# Patient Record
Sex: MALE | Race: WHITE | NOT HISPANIC OR LATINO | URBAN - METROPOLITAN AREA
[De-identification: names, ages, dates, MRNs, and addresses within clinical notes are randomized per-mention and may not be internally consistent; named-entity substitution may affect disease eponyms.]

---

## 2024-06-11 ENCOUNTER — APPOINTMENT (EMERGENCY)
Dept: RADIOLOGY | Facility: HOSPITAL | Age: 59
DRG: 091 | End: 2024-06-11
Payer: COMMERCIAL

## 2024-06-11 ENCOUNTER — HOSPITAL ENCOUNTER (OUTPATIENT)
Facility: HOSPITAL | Age: 59
Setting detail: OBSERVATION
Discharge: OTHER INSTITUTION | DRG: 091 | End: 2024-06-12
Attending: EMERGENCY MEDICINE | Admitting: STUDENT IN AN ORGANIZED HEALTH CARE EDUCATION/TRAINING PROGRAM
Payer: COMMERCIAL

## 2024-06-11 DIAGNOSIS — R47.81 SLURRED SPEECH: Primary | ICD-10-CM

## 2024-06-11 DIAGNOSIS — R41.82 AMS (ALTERED MENTAL STATUS): ICD-10-CM

## 2024-06-11 DIAGNOSIS — F10.10 ALCOHOL ABUSE: ICD-10-CM

## 2024-06-11 PROBLEM — I10 PRIMARY HYPERTENSION: Status: ACTIVE | Noted: 2024-06-11

## 2024-06-11 PROBLEM — Z86.73 HISTORY OF STROKE: Status: ACTIVE | Noted: 2024-06-11

## 2024-06-11 PROBLEM — G93.40 ENCEPHALOPATHY: Status: ACTIVE | Noted: 2024-06-11

## 2024-06-11 PROBLEM — Z87.898 HISTORY OF CRACK COCAINE USE: Status: ACTIVE | Noted: 2024-06-11

## 2024-06-11 PROBLEM — E11.9 TYPE 2 DIABETES MELLITUS WITHOUT COMPLICATION, WITHOUT LONG-TERM CURRENT USE OF INSULIN (HCC): Status: ACTIVE | Noted: 2024-06-11

## 2024-06-11 LAB
2HR DELTA HS TROPONIN: 0 NG/L
ALBUMIN SERPL BCP-MCNC: 3.9 G/DL (ref 3.5–5)
ALP SERPL-CCNC: 84 U/L (ref 34–104)
ALT SERPL W P-5'-P-CCNC: 79 U/L (ref 7–52)
AMMONIA PLAS-SCNC: 30 UMOL/L (ref 18–72)
AMPHETAMINES SERPL QL SCN: NEGATIVE
ANION GAP SERPL CALCULATED.3IONS-SCNC: 7 MMOL/L (ref 4–13)
APAP SERPL-MCNC: <2 UG/ML (ref 10–20)
AST SERPL W P-5'-P-CCNC: 37 U/L (ref 13–39)
ATRIAL RATE: 26 BPM
ATRIAL RATE: 81 BPM
BACTERIA UR QL AUTO: ABNORMAL /HPF
BARBITURATES UR QL: NEGATIVE
BASE EX.OXY STD BLDV CALC-SCNC: 76.3 % (ref 60–80)
BASE EXCESS BLDV CALC-SCNC: 3.6 MMOL/L
BASOPHILS # BLD AUTO: 0.02 THOUSANDS/ÂΜL (ref 0–0.1)
BASOPHILS NFR BLD AUTO: 0 % (ref 0–1)
BENZODIAZ UR QL: POSITIVE
BILIRUB DIRECT SERPL-MCNC: 0.08 MG/DL (ref 0–0.2)
BILIRUB SERPL-MCNC: 0.25 MG/DL (ref 0.2–1)
BILIRUB UR QL STRIP: NEGATIVE
BUN SERPL-MCNC: 12 MG/DL (ref 5–25)
CALCIUM SERPL-MCNC: 9.5 MG/DL (ref 8.4–10.2)
CARDIAC TROPONIN I PNL SERPL HS: 16 NG/L
CARDIAC TROPONIN I PNL SERPL HS: 16 NG/L
CHLORIDE SERPL-SCNC: 101 MMOL/L (ref 96–108)
CLARITY UR: CLEAR
CO2 SERPL-SCNC: 33 MMOL/L (ref 21–32)
COCAINE UR QL: NEGATIVE
COLOR UR: ABNORMAL
CREAT SERPL-MCNC: 0.73 MG/DL (ref 0.6–1.3)
EOSINOPHIL # BLD AUTO: 0.12 THOUSAND/ÂΜL (ref 0–0.61)
EOSINOPHIL NFR BLD AUTO: 2 % (ref 0–6)
ERYTHROCYTE [DISTWIDTH] IN BLOOD BY AUTOMATED COUNT: 12.9 % (ref 11.6–15.1)
EST. AVERAGE GLUCOSE BLD GHB EST-MCNC: 160 MG/DL
ETHANOL SERPL-MCNC: <10 MG/DL
FENTANYL UR QL SCN: NEGATIVE
GFR SERPL CREATININE-BSD FRML MDRD: 101 ML/MIN/1.73SQ M
GLUCOSE SERPL-MCNC: 134 MG/DL (ref 65–140)
GLUCOSE SERPL-MCNC: 193 MG/DL (ref 65–140)
GLUCOSE SERPL-MCNC: 211 MG/DL (ref 65–140)
GLUCOSE SERPL-MCNC: 213 MG/DL (ref 65–140)
GLUCOSE UR STRIP-MCNC: NEGATIVE MG/DL
HBA1C MFR BLD: 7.2 %
HCO3 BLDV-SCNC: 31.1 MMOL/L (ref 24–30)
HCT VFR BLD AUTO: 43.6 % (ref 36.5–49.3)
HGB BLD-MCNC: 14.5 G/DL (ref 12–17)
HGB UR QL STRIP.AUTO: NEGATIVE
HYDROCODONE UR QL SCN: NEGATIVE
IMM GRANULOCYTES # BLD AUTO: 0.03 THOUSAND/UL (ref 0–0.2)
IMM GRANULOCYTES NFR BLD AUTO: 1 % (ref 0–2)
KETONES UR STRIP-MCNC: NEGATIVE MG/DL
LEUKOCYTE ESTERASE UR QL STRIP: NEGATIVE
LYMPHOCYTES # BLD AUTO: 1.13 THOUSANDS/ÂΜL (ref 0.6–4.47)
LYMPHOCYTES NFR BLD AUTO: 19 % (ref 14–44)
MCH RBC QN AUTO: 30.6 PG (ref 26.8–34.3)
MCHC RBC AUTO-ENTMCNC: 33.3 G/DL (ref 31.4–37.4)
MCV RBC AUTO: 92 FL (ref 82–98)
METHADONE UR QL: NEGATIVE
MONOCYTES # BLD AUTO: 0.5 THOUSAND/ÂΜL (ref 0.17–1.22)
MONOCYTES NFR BLD AUTO: 8 % (ref 4–12)
MUCOUS THREADS UR QL AUTO: ABNORMAL
NEUTROPHILS # BLD AUTO: 4.21 THOUSANDS/ÂΜL (ref 1.85–7.62)
NEUTS SEG NFR BLD AUTO: 70 % (ref 43–75)
NITRITE UR QL STRIP: NEGATIVE
NON-SQ EPI CELLS URNS QL MICRO: ABNORMAL /HPF
NRBC BLD AUTO-RTO: 0 /100 WBCS
O2 CT BLDV-SCNC: 16.9 ML/DL
OPIATES UR QL SCN: NEGATIVE
OXYCODONE+OXYMORPHONE UR QL SCN: NEGATIVE
P AXIS: 191 DEGREES
P AXIS: 64 DEGREES
PCO2 BLDV: 58.6 MM HG (ref 42–50)
PCP UR QL: NEGATIVE
PH BLDV: 7.34 [PH] (ref 7.3–7.4)
PH UR STRIP.AUTO: 6.5 [PH]
PLATELET # BLD AUTO: 221 THOUSANDS/UL (ref 149–390)
PMV BLD AUTO: 10.5 FL (ref 8.9–12.7)
PO2 BLDV: 42.9 MM HG (ref 35–45)
POTASSIUM SERPL-SCNC: 4.2 MMOL/L (ref 3.5–5.3)
PR INTERVAL: 128 MS
PROT SERPL-MCNC: 6.7 G/DL (ref 6.4–8.4)
PROT UR STRIP-MCNC: ABNORMAL MG/DL
QRS AXIS: 35 DEGREES
QRS AXIS: 46 DEGREES
QRSD INTERVAL: 102 MS
QRSD INTERVAL: 110 MS
QT INTERVAL: 348 MS
QT INTERVAL: 422 MS
QTC INTERVAL: 404 MS
QTC INTERVAL: 486 MS
RBC # BLD AUTO: 4.74 MILLION/UL (ref 3.88–5.62)
RBC #/AREA URNS AUTO: ABNORMAL /HPF
SALICYLATES SERPL-MCNC: <5 MG/DL (ref 3–20)
SODIUM SERPL-SCNC: 141 MMOL/L (ref 135–147)
SP GR UR STRIP.AUTO: >=1.05 (ref 1–1.03)
T WAVE AXIS: 32 DEGREES
T WAVE AXIS: 42 DEGREES
T4 FREE SERPL-MCNC: 0.58 NG/DL (ref 0.61–1.12)
THC UR QL: NEGATIVE
TSH SERPL DL<=0.05 MIU/L-ACNC: 37.5 UIU/ML (ref 0.45–4.5)
UROBILINOGEN UR STRIP-ACNC: <2 MG/DL
VENTRICULAR RATE: 80 BPM
VENTRICULAR RATE: 81 BPM
WBC # BLD AUTO: 6.01 THOUSAND/UL (ref 4.31–10.16)
WBC #/AREA URNS AUTO: ABNORMAL /HPF

## 2024-06-11 PROCEDURE — 82077 ASSAY SPEC XCP UR&BREATH IA: CPT

## 2024-06-11 PROCEDURE — 99291 CRITICAL CARE FIRST HOUR: CPT | Performed by: PSYCHIATRY & NEUROLOGY

## 2024-06-11 PROCEDURE — 82948 REAGENT STRIP/BLOOD GLUCOSE: CPT

## 2024-06-11 PROCEDURE — 80179 DRUG ASSAY SALICYLATE: CPT

## 2024-06-11 PROCEDURE — 93005 ELECTROCARDIOGRAM TRACING: CPT

## 2024-06-11 PROCEDURE — 80076 HEPATIC FUNCTION PANEL: CPT | Performed by: STUDENT IN AN ORGANIZED HEALTH CARE EDUCATION/TRAINING PROGRAM

## 2024-06-11 PROCEDURE — 80048 BASIC METABOLIC PNL TOTAL CA: CPT

## 2024-06-11 PROCEDURE — 80307 DRUG TEST PRSMV CHEM ANLYZR: CPT | Performed by: STUDENT IN AN ORGANIZED HEALTH CARE EDUCATION/TRAINING PROGRAM

## 2024-06-11 PROCEDURE — 85025 COMPLETE CBC W/AUTO DIFF WBC: CPT | Performed by: PHYSICIAN ASSISTANT

## 2024-06-11 PROCEDURE — 82140 ASSAY OF AMMONIA: CPT | Performed by: PHYSICIAN ASSISTANT

## 2024-06-11 PROCEDURE — 70498 CT ANGIOGRAPHY NECK: CPT

## 2024-06-11 PROCEDURE — 82805 BLOOD GASES W/O2 SATURATION: CPT

## 2024-06-11 PROCEDURE — 94640 AIRWAY INHALATION TREATMENT: CPT

## 2024-06-11 PROCEDURE — 99223 1ST HOSP IP/OBS HIGH 75: CPT | Performed by: STUDENT IN AN ORGANIZED HEALTH CARE EDUCATION/TRAINING PROGRAM

## 2024-06-11 PROCEDURE — 96374 THER/PROPH/DIAG INJ IV PUSH: CPT

## 2024-06-11 PROCEDURE — 71045 X-RAY EXAM CHEST 1 VIEW: CPT

## 2024-06-11 PROCEDURE — 99285 EMERGENCY DEPT VISIT HI MDM: CPT | Performed by: EMERGENCY MEDICINE

## 2024-06-11 PROCEDURE — 80143 DRUG ASSAY ACETAMINOPHEN: CPT

## 2024-06-11 PROCEDURE — 83036 HEMOGLOBIN GLYCOSYLATED A1C: CPT | Performed by: STUDENT IN AN ORGANIZED HEALTH CARE EDUCATION/TRAINING PROGRAM

## 2024-06-11 PROCEDURE — 93010 ELECTROCARDIOGRAM REPORT: CPT | Performed by: INTERNAL MEDICINE

## 2024-06-11 PROCEDURE — 70496 CT ANGIOGRAPHY HEAD: CPT

## 2024-06-11 PROCEDURE — 84439 ASSAY OF FREE THYROXINE: CPT | Performed by: STUDENT IN AN ORGANIZED HEALTH CARE EDUCATION/TRAINING PROGRAM

## 2024-06-11 PROCEDURE — 36415 COLL VENOUS BLD VENIPUNCTURE: CPT

## 2024-06-11 PROCEDURE — 81001 URINALYSIS AUTO W/SCOPE: CPT | Performed by: STUDENT IN AN ORGANIZED HEALTH CARE EDUCATION/TRAINING PROGRAM

## 2024-06-11 PROCEDURE — 84484 ASSAY OF TROPONIN QUANT: CPT

## 2024-06-11 PROCEDURE — 84443 ASSAY THYROID STIM HORMONE: CPT | Performed by: STUDENT IN AN ORGANIZED HEALTH CARE EDUCATION/TRAINING PROGRAM

## 2024-06-11 PROCEDURE — 99285 EMERGENCY DEPT VISIT HI MDM: CPT

## 2024-06-11 RX ORDER — ASPIRIN 81 MG/1
81 TABLET, CHEWABLE ORAL DAILY
Status: DISCONTINUED | OUTPATIENT
Start: 2024-06-12 | End: 2024-06-12 | Stop reason: HOSPADM

## 2024-06-11 RX ORDER — HYDROXYZINE PAMOATE 25 MG/1
25 CAPSULE ORAL 3 TIMES DAILY PRN
COMMUNITY
End: 2024-06-12

## 2024-06-11 RX ORDER — ALBUTEROL SULFATE 90 UG/1
2 AEROSOL, METERED RESPIRATORY (INHALATION) EVERY 6 HOURS PRN
COMMUNITY

## 2024-06-11 RX ORDER — CLOPIDOGREL BISULFATE 75 MG/1
75 TABLET ORAL DAILY
COMMUNITY
Start: 2024-01-23

## 2024-06-11 RX ORDER — ENOXAPARIN SODIUM 100 MG/ML
40 INJECTION SUBCUTANEOUS DAILY
Status: DISCONTINUED | OUTPATIENT
Start: 2024-06-11 | End: 2024-06-12 | Stop reason: HOSPADM

## 2024-06-11 RX ORDER — LABETALOL HYDROCHLORIDE 5 MG/ML
10 INJECTION, SOLUTION INTRAVENOUS EVERY 6 HOURS PRN
Status: DISCONTINUED | OUTPATIENT
Start: 2024-06-11 | End: 2024-06-12 | Stop reason: HOSPADM

## 2024-06-11 RX ORDER — GABAPENTIN 400 MG/1
400 CAPSULE ORAL 3 TIMES DAILY
COMMUNITY
End: 2024-06-12

## 2024-06-11 RX ORDER — LISINOPRIL 20 MG/1
40 TABLET ORAL DAILY
Status: DISCONTINUED | OUTPATIENT
Start: 2024-06-12 | End: 2024-06-12 | Stop reason: HOSPADM

## 2024-06-11 RX ORDER — DIAZEPAM 5 MG/1
5 TABLET ORAL EVERY 6 HOURS PRN
Status: DISCONTINUED | OUTPATIENT
Start: 2024-06-11 | End: 2024-06-12 | Stop reason: HOSPADM

## 2024-06-11 RX ORDER — LEVETIRACETAM 750 MG/1
750 TABLET ORAL 2 TIMES DAILY
COMMUNITY
Start: 2024-06-07 | End: 2024-06-12

## 2024-06-11 RX ORDER — DOXAZOSIN 8 MG/1
8 TABLET ORAL
Status: ON HOLD | COMMUNITY
End: 2024-06-18

## 2024-06-11 RX ORDER — QUETIAPINE FUMARATE 50 MG/1
50 TABLET, FILM COATED ORAL
COMMUNITY

## 2024-06-11 RX ORDER — OXYCODONE HYDROCHLORIDE 5 MG/1
5 TABLET ORAL EVERY 4 HOURS PRN
Status: DISCONTINUED | OUTPATIENT
Start: 2024-06-11 | End: 2024-06-12 | Stop reason: HOSPADM

## 2024-06-11 RX ORDER — CLOPIDOGREL BISULFATE 75 MG/1
75 TABLET ORAL DAILY
Status: DISCONTINUED | OUTPATIENT
Start: 2024-06-11 | End: 2024-06-11

## 2024-06-11 RX ORDER — LIDOCAINE 50 MG/G
1 PATCH TOPICAL DAILY
Status: DISCONTINUED | OUTPATIENT
Start: 2024-06-11 | End: 2024-06-12 | Stop reason: HOSPADM

## 2024-06-11 RX ORDER — METHOCARBAMOL 750 MG/1
750 TABLET, FILM COATED ORAL EVERY 6 HOURS PRN
COMMUNITY
End: 2024-06-12

## 2024-06-11 RX ORDER — ACETAMINOPHEN 325 MG/1
975 TABLET ORAL EVERY 8 HOURS SCHEDULED
Status: DISCONTINUED | OUTPATIENT
Start: 2024-06-11 | End: 2024-06-12 | Stop reason: HOSPADM

## 2024-06-11 RX ORDER — CLONIDINE HYDROCHLORIDE 0.1 MG/1
0.1 TABLET ORAL EVERY 4 HOURS PRN
COMMUNITY
End: 2024-06-18

## 2024-06-11 RX ORDER — LISINOPRIL 20 MG/1
40 TABLET ORAL DAILY
COMMUNITY
Start: 2024-01-23 | End: 2024-06-18

## 2024-06-11 RX ORDER — ALBUTEROL SULFATE 90 UG/1
2 AEROSOL, METERED RESPIRATORY (INHALATION) EVERY 6 HOURS PRN
Status: DISCONTINUED | OUTPATIENT
Start: 2024-06-11 | End: 2024-06-12 | Stop reason: HOSPADM

## 2024-06-11 RX ORDER — DIAZEPAM 10 MG/1
10 TABLET ORAL EVERY 6 HOURS PRN
COMMUNITY
End: 2024-06-18

## 2024-06-11 RX ORDER — QUETIAPINE FUMARATE 25 MG/1
50 TABLET, FILM COATED ORAL
Status: DISCONTINUED | OUTPATIENT
Start: 2024-06-11 | End: 2024-06-12 | Stop reason: HOSPADM

## 2024-06-11 RX ORDER — IBUPROFEN 600 MG/1
TABLET ORAL EVERY 6 HOURS PRN
COMMUNITY
End: 2024-06-18

## 2024-06-11 RX ORDER — ASPIRIN 81 MG/1
81 TABLET, CHEWABLE ORAL DAILY
Status: DISCONTINUED | OUTPATIENT
Start: 2024-06-11 | End: 2024-06-11

## 2024-06-11 RX ORDER — GABAPENTIN 100 MG/1
100 CAPSULE ORAL 3 TIMES DAILY
Status: DISCONTINUED | OUTPATIENT
Start: 2024-06-11 | End: 2024-06-12 | Stop reason: HOSPADM

## 2024-06-11 RX ORDER — ATORVASTATIN CALCIUM 40 MG/1
40 TABLET, FILM COATED ORAL DAILY
COMMUNITY
Start: 2024-01-23

## 2024-06-11 RX ORDER — CLOPIDOGREL BISULFATE 75 MG/1
75 TABLET ORAL DAILY
Status: DISCONTINUED | OUTPATIENT
Start: 2024-06-12 | End: 2024-06-12 | Stop reason: HOSPADM

## 2024-06-11 RX ORDER — DUTASTERIDE 0.5 MG/1
0.5 CAPSULE, LIQUID FILLED ORAL DAILY
COMMUNITY
End: 2024-06-18

## 2024-06-11 RX ORDER — MIRTAZAPINE 15 MG/1
15 TABLET, ORALLY DISINTEGRATING ORAL
Status: DISCONTINUED | OUTPATIENT
Start: 2024-06-11 | End: 2024-06-12 | Stop reason: HOSPADM

## 2024-06-11 RX ORDER — MIRTAZAPINE 15 MG/1
15 TABLET, ORALLY DISINTEGRATING ORAL
COMMUNITY

## 2024-06-11 RX ORDER — ATORVASTATIN CALCIUM 40 MG/1
40 TABLET, FILM COATED ORAL
Status: DISCONTINUED | OUTPATIENT
Start: 2024-06-11 | End: 2024-06-12 | Stop reason: HOSPADM

## 2024-06-11 RX ORDER — ALBUTEROL SULFATE 2.5 MG/3ML
5 SOLUTION RESPIRATORY (INHALATION) ONCE
Status: COMPLETED | OUTPATIENT
Start: 2024-06-11 | End: 2024-06-11

## 2024-06-11 RX ORDER — FINASTERIDE 5 MG/1
5 TABLET, FILM COATED ORAL DAILY
Status: DISCONTINUED | OUTPATIENT
Start: 2024-06-12 | End: 2024-06-12 | Stop reason: HOSPADM

## 2024-06-11 RX ORDER — ASPIRIN 81 MG/1
81 TABLET, CHEWABLE ORAL DAILY
COMMUNITY

## 2024-06-11 RX ORDER — INSULIN LISPRO 100 [IU]/ML
1-5 INJECTION, SOLUTION INTRAVENOUS; SUBCUTANEOUS
Status: DISCONTINUED | OUTPATIENT
Start: 2024-06-11 | End: 2024-06-12 | Stop reason: HOSPADM

## 2024-06-11 RX ORDER — DOXAZOSIN MESYLATE 4 MG/1
8 TABLET ORAL
Status: DISCONTINUED | OUTPATIENT
Start: 2024-06-11 | End: 2024-06-12 | Stop reason: HOSPADM

## 2024-06-11 RX ORDER — ATORVASTATIN CALCIUM 40 MG/1
40 TABLET, FILM COATED ORAL DAILY
Status: DISCONTINUED | OUTPATIENT
Start: 2024-06-11 | End: 2024-06-11

## 2024-06-11 RX ADMIN — LIDOCAINE 5% 1 PATCH: 700 PATCH TOPICAL at 14:27

## 2024-06-11 RX ADMIN — ALBUTEROL SULFATE 5 MG: 2.5 SOLUTION RESPIRATORY (INHALATION) at 10:12

## 2024-06-11 RX ADMIN — MIRTAZAPINE 15 MG: 15 TABLET, ORALLY DISINTEGRATING ORAL at 21:58

## 2024-06-11 RX ADMIN — ENOXAPARIN SODIUM 40 MG: 40 INJECTION SUBCUTANEOUS at 13:26

## 2024-06-11 RX ADMIN — Medication 2.5 MG: at 16:39

## 2024-06-11 RX ADMIN — OXYCODONE HYDROCHLORIDE 5 MG: 5 TABLET ORAL at 23:10

## 2024-06-11 RX ADMIN — DIAZEPAM 5 MG: 5 TABLET ORAL at 21:58

## 2024-06-11 RX ADMIN — SODIUM CHLORIDE, PRESERVATIVE FREE 0.04 MG: 5 INJECTION INTRAVENOUS at 09:18

## 2024-06-11 RX ADMIN — GABAPENTIN 100 MG: 100 CAPSULE ORAL at 16:03

## 2024-06-11 RX ADMIN — LABETALOL HYDROCHLORIDE 10 MG: 5 INJECTION, SOLUTION INTRAVENOUS at 18:18

## 2024-06-11 RX ADMIN — IPRATROPIUM BROMIDE 0.5 MG: 0.5 SOLUTION RESPIRATORY (INHALATION) at 10:12

## 2024-06-11 RX ADMIN — GABAPENTIN 100 MG: 100 CAPSULE ORAL at 21:58

## 2024-06-11 RX ADMIN — IOHEXOL 85 ML: 350 INJECTION, SOLUTION INTRAVENOUS at 09:37

## 2024-06-11 RX ADMIN — ATORVASTATIN CALCIUM 40 MG: 40 TABLET, FILM COATED ORAL at 16:03

## 2024-06-11 RX ADMIN — QUETIAPINE 50 MG: 25 TABLET ORAL at 21:58

## 2024-06-11 RX ADMIN — ACETAMINOPHEN 975 MG: 325 TABLET, FILM COATED ORAL at 14:29

## 2024-06-11 RX ADMIN — DOXAZOSIN 8 MG: 4 TABLET ORAL at 21:57

## 2024-06-11 RX ADMIN — DIAZEPAM 5 MG: 5 TABLET ORAL at 14:29

## 2024-06-11 RX ADMIN — OXYCODONE HYDROCHLORIDE 5 MG: 5 TABLET ORAL at 18:02

## 2024-06-11 RX ADMIN — ACETAMINOPHEN 975 MG: 325 TABLET, FILM COATED ORAL at 21:58

## 2024-06-11 NOTE — ED ATTENDING ATTESTATION
6/11/2024  I, Janee Finley, , saw and evaluated the patient. I have discussed the patient with the resident/non-physician practitioner and agree with the resident's/non-physician practitioner's findings, Plan of Care, and MDM as documented in the resident's/non-physician practitioner's note, except where noted. All available labs and Radiology studies were reviewed.  I was present for key portions of any procedure(s) performed by the resident/non-physician practitioner and I was immediately available to provide assistance.       At this point I agree with the current assessment done in the Emergency Department.  I have conducted an independent evaluation of this patient a history and physical is as follows:    58-year-old male who presents from rehab facility.  Patient states approximately 1 hour ago he felt like he was having right-sided weakness and slurred speech.  Patient states it feels like he is having a stroke again.  He reports that he has history of 2 strokes and 2 TIAs in the past.  Also complains of some chest pain.  On exam-no acute distress, heart regular, no respiratory distress, patient sleepy at times with speech that is somewhat slurred, pupils are pinpoint, slight weakness noted in right upper extremity.  Plan-stroke alert was called, blood sugar 213 on arrival, also given small dose of Narcan given pinpoint pupils and patient very sleepy.  Will do cardiac labs, CT and stroke labs, reassess    ED Course         Critical Care Time  Procedures

## 2024-06-11 NOTE — CASE MANAGEMENT
Case Management Assessment & Discharge Planning Note    Patient name Kyler Almaguer  Location ED 17/ED 17 MRN [id number]  : 1965 Date 2024       Current Admission Date: 2024  Current Admission Diagnosis:  There are no problems to display for this patient.     LOS (days): 0  Geometric Mean LOS (GMLOS) (days):   Days to GMLOS:     OBJECTIVE:          Current admission status: Emergency  Referral Reason: Stroke    Preferred Pharmacy: No Pharmacies Listed  Primary Care Provider: No primary care provider on file.    Primary Insurance:   Secondary Insurance:     ASSESSMENT:  Active Health Care Proxies    There are no active Health Care Proxies on file.                 Readmission Root Cause  30 Day Readmission: No    Patient Information  Admitted from:: Other (comment) ([facility])  Mental Status: Confused  During Assessment patient was accompanied by: Not accompanied during assessment  Assessment information provided by:: Spouse  Primary Caregiver: Self  Support Systems: Self, Spouse/significant other  What city do you live in?: [place]    Activities of Daily Living Prior to Admission  Functional Status: Independent  Completes ADLs independently?: Yes  Ambulates independently?: Yes  Does patient use assisted devices?: No  Does patient currently own DME?: No  Does patient have a history of Outpatient Therapy (PT/OT)?: Yes         Patient Information Continued  Does patient have a history of substance abuse?: Yes (Currently at [facility])  Is patient currently in treatment for substance abuse?: Yes    Social Determinants of Health (SDOH)      Flowsheet Row Most Recent Value   Housing Stability    In the last 12 months, was there a time when you were not able to pay the mortgage or rent on time? Pt Unable   At any time in the past 12 months, were you homeless or living in a shelter (including now)? Pt Unable   Transportation Needs    In the past 12 months, has lack of transportation  kept you from medical appointments or from getting medications? Pt Unable   In the past 12 months, has lack of transportation kept you from meetings, work, or from getting things needed for daily living? Pt Unable   Food Insecurity    Within the past 12 months, you worried that your food would run out before you got the money to buy more. Pt Unable   Within the past 12 months, the food you bought just didn't last and you didn't have money to get more. Pt Unable   Utilities    In the past 12 months has the electric, gas, oil, or water company threatened to shut off services in your home? Pt Unable            DISCHARGE DETAILS:    Discharge planning discussed with:: Wife- Jackelin  Freedom of Choice: Yes     CM contacted family/caregiver?: Yes     Contacts  Patient Contacts: Jackelin Almaguer  Relationship to Patient:: Family  Contact Method: Phone  Phone Number: [phone]  Reason/Outcome: Emergency Contact  Additional Comments: CM arrived to pt's room after a stroke alert was called in the ED.  Per report, Pt was brought to [facility] ED from [facility] after pt reported slurred speech and extremity weakness. Pt was lethargic and falling asleep in the room while being evaluated and when CM was trying to speak with him.  Pt did ask CM to call his wife, Jackelin, and provided CM with the phone number: [phone].  CM called Jackelin and notified her that pt is in the ED and undergoing a stroke evaluation. CM explained that the ER does not know if this is a stroke or not yet but we are evaluating.  Jackelin states that she will be on her way, coming from [facility]. CM updated RN that wife is coming but it will take some time for her to get here. Pt told provider that he has been at [facility] since Saturday.

## 2024-06-11 NOTE — ED PROVIDER NOTES
"History  Chief Complaint   Patient presents with    CVA/TIA-like Symptoms     Per pt he stated \"I think I had a TIA an hour ago, I have hx strokes and this is how I felt the last time.\" Pt feels his speech is garbled.      57 yo M PMHx of alcohol abuse, DM, self-reported hx of CVA and TIA with residual R sided weakness and facial droop presents from [facility] for complaint of R sided weakness and slurred speech. Last known normal 1 hour prior to arrival. Patient states he woke up in his usual state of health, suddenly began feeling weak on the R side, slurred speech and complaints of chest pain. States this feels similar to past strokes. Patient currently at alcohol rehab for the past 4 days. He has been on a valium taper, did receive a dose at 7am. He is on aspirin/plavix since stroke. Denies substance use otherwise. Endorses nausea, no vomiting no abdominal pain, No headache. Endorses chest pain and SOB. Hx of COPD not on O2 at home.         Prior to Admission Medications   Prescriptions Last Dose Informant Patient Reported? Taking?   QUEtiapine (SEROquel) 50 mg tablet   Yes Yes   Sig: Take 50 mg by mouth daily at bedtime   albuterol (PROVENTIL HFA,VENTOLIN HFA) 90 mcg/act inhaler   Yes Yes   Sig: Inhale 2 puffs every 6 (six) hours as needed for wheezing   aspirin 81 mg chewable tablet   Yes Yes   Sig: Chew 81 mg daily   atorvastatin (LIPITOR) 40 mg tablet   Yes Yes   Sig: Take 40 mg by mouth daily   cloNIDine (CATAPRES) 0.1 mg tablet   Yes Yes   Sig: Take 0.1 mg by mouth every 4 (four) hours as needed for high blood pressure   clopidogrel (PLAVIX) 75 mg tablet   Yes Yes   Sig: Take 75 mg by mouth daily   diazepam (VALIUM) 10 mg tablet   Yes Yes   Sig: Take 10 mg by mouth every 6 (six) hours as needed for anxiety   doxazosin (CARDURA) 8 MG tablet   Yes Yes   Sig: Take 8 mg by mouth daily at bedtime   dutasteride (AVODART) 0.5 mg capsule   Yes No   Sig: Take 0.5 mg by mouth daily   gabapentin (NEURONTIN) 400 " mg capsule   Yes Yes   Sig: Take 400 mg by mouth 3 (three) times a day   hydrOXYzine pamoate (VISTARIL) 25 mg capsule   Yes Yes   Sig: Take 25 mg by mouth 3 (three) times a day as needed for itching   ibuprofen (MOTRIN) 600 mg tablet   Yes Yes   Sig: Take by mouth every 6 (six) hours as needed for mild pain   levETIRAcetam (KEPPRA) 750 mg tablet   Yes Yes   Sig: Take 750 mg by mouth 2 (two) times a day   lisinopril (ZESTRIL) 20 mg tablet   Yes Yes   Sig: Take 40 mg by mouth daily   metFORMIN (GLUCOPHAGE) 1000 MG tablet   Yes Yes   Sig: Take 1,000 mg by mouth 2 (two) times a day   methocarbamol (ROBAXIN) 750 mg tablet   Yes Yes   Sig: Take 750 mg by mouth every 6 (six) hours as needed for muscle spasms   mirtazapine (REMERON SOL-TAB) 15 mg disintegrating tablet   Yes Yes   Sig: Take 15 mg by mouth daily at bedtime      Facility-Administered Medications: None       No past medical history on file.    No past surgical history on file.    No family history on file.  I have reviewed and agree with the history as documented.    No existing history information found.  No existing history information found.        Review of Systems   Unable to perform ROS: Mental status change       Physical Exam  ED Triage Vitals   Temperature Pulse Respirations Blood Pressure SpO2   06/11/24 1042 06/11/24 0930 06/11/24 0930 06/11/24 0930 06/11/24 0930   98.4 °F (36.9 °C) 86 14 156/97 96 %      Temp Source Heart Rate Source Patient Position - Orthostatic VS BP Location FiO2 (%)   06/11/24 1042 06/11/24 0930 06/11/24 1100 06/11/24 1100 --   Oral Monitor Lying Right arm       Pain Score       --                    Orthostatic Vital Signs  Vitals:    06/11/24 0931 06/11/24 0945 06/11/24 1000 06/11/24 1100   BP: 151/88 151/97 153/99 (!) 185/101   Pulse: 80 78 74 80   Patient Position - Orthostatic VS:    Lying       Physical Exam  Vitals and nursing note reviewed.   Constitutional:       General: He is not in acute distress.     Appearance: He  is well-developed. He is ill-appearing.   HENT:      Head: Normocephalic and atraumatic.   Eyes:      Conjunctiva/sclera: Conjunctivae normal.      Comments: Pinpoint pupils   Cardiovascular:      Rate and Rhythm: Normal rate and regular rhythm.      Heart sounds: No murmur heard.  Pulmonary:      Effort: Pulmonary effort is normal. No respiratory distress.      Breath sounds: Wheezing present.   Abdominal:      Palpations: Abdomen is soft.      Tenderness: There is no abdominal tenderness.   Musculoskeletal:         General: No swelling.      Cervical back: Neck supple.      Right lower leg: No edema.      Left lower leg: No edema.   Skin:     General: Skin is warm and dry.      Capillary Refill: Capillary refill takes less than 2 seconds.   Neurological:      Mental Status: He is alert.      Cranial Nerves: Dysarthria and facial asymmetry (R sided facial droop (mild)) present.      Comments: 4/5 weakness with  strength on the R. 5/5 strength on the L.  5/5 strength in the b/l LE.    Psychiatric:         Mood and Affect: Mood normal.         ED Medications  Medications   albuterol (PROVENTIL HFA,VENTOLIN HFA) inhaler 2 puff (has no administration in time range)   doxazosin (CARDURA) tablet 8 mg (has no administration in time range)   finasteride (PROSCAR) tablet 5 mg (has no administration in time range)   gabapentin (NEURONTIN) capsule 100 mg (has no administration in time range)   lisinopril (ZESTRIL) tablet 40 mg (has no administration in time range)   QUEtiapine (SEROquel) tablet 50 mg (has no administration in time range)   mirtazapine (REMERON SOL-TAB) dispersible tablet 15 mg (has no administration in time range)   enoxaparin (LOVENOX) subcutaneous injection 40 mg (has no administration in time range)   aspirin chewable tablet 81 mg (has no administration in time range)   atorvastatin (LIPITOR) tablet 40 mg (has no administration in time range)   clopidogrel (PLAVIX) tablet 75 mg (has no administration  in time range)   insulin lispro (HumALOG/ADMELOG) 100 units/mL subcutaneous injection 1-5 Units (has no administration in time range)   naloxone (NARCAN) 0.04 mg/mL syringe 0.04 mg (0.04 mg Intravenous Given 6/11/24 0918)   albuterol inhalation solution 5 mg (5 mg Nebulization Given 6/11/24 1012)   ipratropium (ATROVENT) 0.02 % inhalation solution 0.5 mg (0.5 mg Nebulization Given 6/11/24 1012)   iohexol (OMNIPAQUE) 350 MG/ML injection (MULTI-DOSE) 85 mL (85 mL Intravenous Given 6/11/24 0937)       Diagnostic Studies  Results Reviewed       Procedure Component Value Units Date/Time    Rapid drug screen, urine [[id number]] Collected: 06/11/24 1243    Lab Status: In process Specimen: Urine, Other Updated: 06/11/24 1249    UA w Reflex to Microscopic w Reflex to Culture [[id number]] Collected: 06/11/24 1243    Lab Status: In process Specimen: Urine, Other Updated: 06/11/24 1249    Hepatic function panel [[id number]] Collected: 06/11/24 0921    Lab Status: In process Specimen: Blood from Arm, Left Updated: 06/11/24 1247    TSH, 3rd generation with Free T4 reflex [[id number]] Collected: 06/11/24 0921    Lab Status: In process Specimen: Blood from Arm, Left Updated: 06/11/24 1247    HS Troponin I 2hr [[id number]]  (Normal) Collected: 06/11/24 1151    Lab Status: Final result Specimen: Blood from Line, Venous Updated: 06/11/24 1233     hs TnI 2hr 16 ng/L      Delta 2hr hsTnI 0 ng/L     Salicylate level [[id number]]  (Normal) Collected: 06/11/24 0941    Lab Status: Final result Specimen: Blood from Arm, Left Updated: 06/11/24 1229     Salicylate Lvl <5 mg/dL     Acetaminophen level-If concentration is detectable, please discuss with medical  on call. [[id number]]  (Abnormal) Collected: 06/11/24 0941    Lab Status: Final result Specimen: Blood from Arm, Left Updated: 06/11/24 1229     Acetaminophen Level <2 ug/mL     HS Troponin I 4hr [[id number]]     Lab Status: No result Specimen: Blood     Ammonia [[id number]]  (Normal)  Collected: 06/11/24 1042    Lab Status: Final result Specimen: Blood from Arm, Left Updated: 06/11/24 1117     Ammonia 30 umol/L     CBC and differential [[id number]] Collected: 06/11/24 1042    Lab Status: Final result Specimen: Blood from Arm, Left Updated: 06/11/24 1106     WBC 6.01 Thousand/uL      RBC 4.74 Million/uL      Hemoglobin 14.5 g/dL      Hematocrit 43.6 %      MCV 92 fL      MCH 30.6 pg      MCHC 33.3 g/dL      RDW 12.9 %      MPV 10.5 fL      Platelets 221 Thousands/uL      nRBC 0 /100 WBCs      Segmented % 70 %      Immature Grans % 1 %      Lymphocytes % 19 %      Monocytes % 8 %      Eosinophils Relative 2 %      Basophils Relative 0 %      Absolute Neutrophils 4.21 Thousands/µL      Absolute Immature Grans 0.03 Thousand/uL      Absolute Lymphocytes 1.13 Thousands/µL      Absolute Monocytes 0.50 Thousand/µL      Eosinophils Absolute 0.12 Thousand/µL      Basophils Absolute 0.02 Thousands/µL     Ethanol [[id number]]  (Normal) Collected: 06/11/24 0941    Lab Status: Final result Specimen: Blood from Arm, Left Updated: 06/11/24 1013     Ethanol Lvl <10 mg/dL     HS Troponin 0hr (reflex protocol) [[id number]]  (Normal) Collected: 06/11/24 0921    Lab Status: Final result Specimen: Blood from Arm, Left Updated: 06/11/24 1007     hs TnI 0hr 16 ng/L     Basic metabolic panel [[id number]]  (Abnormal) Collected: 06/11/24 0921    Lab Status: Final result Specimen: Blood from Arm, Left Updated: 06/11/24 0952     Sodium 141 mmol/L      Potassium 4.2 mmol/L      Chloride 101 mmol/L      CO2 33 mmol/L      ANION GAP 7 mmol/L      BUN 12 mg/dL      Creatinine 0.73 mg/dL      Glucose 211 mg/dL      Calcium 9.5 mg/dL      eGFR 101 ml/min/1.73sq m     Narrative:      National Kidney Disease Foundation guidelines for Chronic Kidney Disease (CKD):     Stage 1 with normal or high GFR (GFR > 90 mL/min/1.73 square meters)    Stage 2 Mild CKD (GFR = 60-89 mL/min/1.73 square meters)    Stage 3A Moderate CKD (GFR = 45-59  mL/min/1.73 square meters)    Stage 3B Moderate CKD (GFR = 30-44 mL/min/1.73 square meters)    Stage 4 Severe CKD (GFR = 15-29 mL/min/1.73 square meters)    Stage 5 End Stage CKD (GFR <15 mL/min/1.73 square meters)  Note: GFR calculation is accurate only with a steady state creatinine    Blood gas, venous [[id number]]  (Abnormal) Collected: 06/11/24 0921    Lab Status: Final result Specimen: Blood from Arm, Left Updated: 06/11/24 0933     pH, Venkatesh 7.343     pCO2, Venkatesh 58.6 mm Hg      pO2, Venkatesh 42.9 mm Hg      HCO3, Venkatesh 31.1 mmol/L      Base Excess, Venkatesh 3.6 mmol/L      O2 Content, Venkatesh 16.9 ml/dL      O2 HGB, VENOUS 76.3 %     Fingerstick Glucose (POCT) [[id number]]  (Abnormal) Collected: 06/11/24 0910    Lab Status: Final result Specimen: Blood Updated: 06/11/24 0911     POC Glucose 213 mg/dl                    XR chest portable   Final Result by Delroy Costa MD (06/11 1156)      No active pulmonary disease.            Workstation performed: [id number]         CT stroke alert brain   Final Result by Yuri Toscano MD (06/11 0934)      No acute intracranial abnormality.      Findings were directly discussed with Abimael Caban at approximately 9:32 a.m. at 6/11/2024.      Workstation performed: [id number]         CTA stroke alert (head/neck)   Final Result by Yuri Toscano MD (06/11 0945)      Negative CTA head and neck for large vessel occlusion, dissection, aneurysm, or high-grade stenosis given motion artifact in mid neck and craniocervical junction regions.      Additional chronic/incidental findings as detailed above.      Findings were directly discussed with Abimael Caban at approximately 9:32 a.m. at 6/11/2024.                     Workstation performed: [id number]               Procedures  Procedures      ED Course  ED Course as of 06/11/24 1256   Tue Jun 11, 2024   1132 Contacted slim for admission                                       Medical Decision Making  57 yo M PMHx of alcohol abuse,  DM, self-reported hx of CVA and TIA with residual R sided weakness and facial droop presents from [email or web address] for complaint of R sided weakness and slurred speech.    Ddx: hypoglycemia, TIA/CVA, intoxication, ACS, sepsis.     Blood sugar wnl. Narcan 0.04mg IV attempted wihtout much improvement in patients somnolence. Due to reported hx of tia/cva and unlear baseline, stroke alert called.     Per neurology, less likely stroke, patient is already on DAPT therapy.   Recommending further eval for toxic metabolic encephalopathy.  Work-up thus far unremarkable.   Case discussed with medicine, patient admitted for further evaluation and management.       Amount and/or Complexity of Data Reviewed  Labs: ordered.  Radiology: ordered.    Risk  Prescription drug management.  Decision regarding hospitalization.          Disposition  Final diagnoses:   Slurred speech   AMS (altered mental status)     Time reflects when diagnosis was documented in both MDM as applicable and the Disposition within this note       Time User Action Codes Description Comment    6/11/2024 10:47 AM Fabby Bernal [R47.81] Slurred speech     6/11/2024 11:40 AM Fabby Bernal [R41.82] AMS (altered mental status)           ED Disposition       ED Disposition   Admit    Condition   Stable    Date/Time   Tue Jun 11, 2024 11:41 AM    Comment   Case was discussed with Dr. Kolb and the patient's admission status was agreed to be Admission Status: observation status to the service of Dr. Kolb.               Follow-up Information    None         Current Discharge Medication List        CONTINUE these medications which have NOT CHANGED    Details   albuterol (PROVENTIL HFA,VENTOLIN HFA) 90 mcg/act inhaler Inhale 2 puffs every 6 (six) hours as needed for wheezing      aspirin 81 mg chewable tablet Chew 81 mg daily      atorvastatin (LIPITOR) 40 mg tablet Take 40 mg by mouth daily      cloNIDine (CATAPRES) 0.1 mg tablet Take 0.1 mg by mouth every 4 (four)  hours as needed for high blood pressure      clopidogrel (PLAVIX) 75 mg tablet Take 75 mg by mouth daily      diazepam (VALIUM) 10 mg tablet Take 10 mg by mouth every 6 (six) hours as needed for anxiety      doxazosin (CARDURA) 8 MG tablet Take 8 mg by mouth daily at bedtime      gabapentin (NEURONTIN) 400 mg capsule Take 400 mg by mouth 3 (three) times a day      hydrOXYzine pamoate (VISTARIL) 25 mg capsule Take 25 mg by mouth 3 (three) times a day as needed for itching      ibuprofen (MOTRIN) 600 mg tablet Take by mouth every 6 (six) hours as needed for mild pain      levETIRAcetam (KEPPRA) 750 mg tablet Take 750 mg by mouth 2 (two) times a day      lisinopril (ZESTRIL) 20 mg tablet Take 40 mg by mouth daily      metFORMIN (GLUCOPHAGE) 1000 MG tablet Take 1,000 mg by mouth 2 (two) times a day      methocarbamol (ROBAXIN) 750 mg tablet Take 750 mg by mouth every 6 (six) hours as needed for muscle spasms      mirtazapine (REMERON SOL-TAB) 15 mg disintegrating tablet Take 15 mg by mouth daily at bedtime      QUEtiapine (SEROquel) 50 mg tablet Take 50 mg by mouth daily at bedtime      dutasteride (AVODART) 0.5 mg capsule Take 0.5 mg by mouth daily           No discharge procedures on file.    PDMP Review       None             ED Provider  Attending physically available and evaluated Kyler Almaguer. I managed the patient along with the ED Attending.    Electronically Signed by           Fabby Bernal MD  06/11/24 4311

## 2024-06-11 NOTE — PROGRESS NOTES
Responded to stroke alert. Medical team providing care for pt. Patient taken to the CT Scan for further exam. No family present at this time. Support to staff.   06/11/24 1000   Clinical Encounter Type   Visited With Patient not available;Health care provider   Crisis Visit Code  (Stroke alert)

## 2024-06-11 NOTE — ASSESSMENT & PLAN NOTE
58 y.o. male with self reported prior TIAs/CVAs (however no signs of prior stroke on CTH), HTN, HLD, DM2, COPD, tobacco use, history of cocaine use, and alcohol use currently at alcohol rehab (last drink reported to be on 6/7) who presented to [facility] on 6/11/2024 as a stroke alert from [facility] rehab due to dysarthria, reported R sided weakness, chest pain, and somnolence.    Upon arrival to the ED, /97.  NIHSS 8 (2 LOC, 1 questions, 1 dysarthria, 4 drift in all extremities).  CT head with pura cisterna magna, but no acute intracranial abnormalities and no prior strokes.  CTA head/neck negative for LVO, dissection, aneurysm, or significant stenosis.  Patient was not a TNK candidate due to unclear last known normal.  (Patient was a poor historian and LKN frequently changed).  He had pinpoint pupils and somnolence, so received IV Narcan without significant improvement.    High suspicion for TME (recommend checking infectious/metabolic workup).  Of note, he has been on a valium taper for the last 4 days.  Low suspicion for stroke due to no focal deficits.   Cannot fully rule out seizure with postictal confusion, however this would likely be provoked from alcohol withdrawal.    Plan:  - Can defer any further neuroimaging at this time  - Recommend TME workup (CBC, CMP, ammonia, TSH, ABG, UDS, UA, coma panel, etc)  - If above workup is unremarkable and patient remains altered, can consider MRI brain   - Continue home aspirin 81 mg and Plavix 75 mg daily (unclear why patient is on DAPT)  In 2014, patient had R sided weakness, but MRI brain was negative for stroke.  Prior notes state that they could not fully rule out small brainstem stroke.  - Continue home Lipitor 40 mg daily  - PT/OT  - Continue to monitor and notify neurology with any changes.   - Medical management and supportive care per primary team. Correction of any metabolic or infectious disturbances

## 2024-06-11 NOTE — H&P
[facility]  H&P  Name: Kyler Almaguer 58 y.o. male I MRN: [id number]  Unit/Bed#: CW2 212-01 I Date of Admission: 6/11/2024   Date of Service: 6/11/2024 I Hospital Day: 0      Assessment & Plan   * Encephalopathy  Assessment & Plan  Self-reported prior history of TIA/CVAs, hypertension, hyperlipidemia, diabetes, COPD, alcohol and cocaine use presenting from alcohol rehab as a stroke alert due to dysarthria, somnolence, right-sided weakness and chest pain  It appears patient was started on new medications at alcohol rehab starting 6/7 including Keppra 750 mg twice daily x 10 days, Valium 10 mg as needed, clonidine 0.1 mg as needed, ibuprofen as needed, Robaxin 750 mg as needed.  Concern for polypharmacy with recent initiation of medications above.  CT/CTA without signs of acute stroke  Neurology was consulted who suspected TME and therefore MRI is deferred at this moment  Awaiting further evaluation with TSH, UDS, UA  Hold Keppra for now, dose reduce gabapentin  CIWA protocol  PT/OT  Appreciate neurology recommendations    Alcohol abuse  Assessment & Plan  Reported last drink on 6/7  Presenting from alcohol rehab  CIWA protocol    History of crack cocaine use  Assessment & Plan  Reported by family  Monitor    History of stroke  Assessment & Plan  On aspirin and Plavix  Neurology following, appreciate recommendations    Type 2 diabetes mellitus without complication, without long-term current use of insulin (HCC)  Assessment & Plan  Lab Results   Component Value Date    HGBA1C 7.2 (H) 05/14/2023       Recent Labs     06/11/24  0910   POCGLU 213*       Blood Sugar Average: Last 72 hrs:  (P) 213    Home metformin held  Sliding scale insulin while inpatient with diabetic diet  Hypoglycemia protocol    Primary hypertension  Assessment & Plan  Home medications: Lisinopril 40 mg daily, doxazosin 8 mg at bedtime  Continue home regimen as above         VTE Pharmacologic Prophylaxis: VTE  Score: 4 Moderate Risk (Score 3-4) - Pharmacological DVT Prophylaxis Ordered: enoxaparin (Lovenox).  Code Status: Level 1 - Full Code   Discussion with family: Updated  (wife) via phone.    Anticipated Length of Stay: Patient will be admitted on an observation basis with an anticipated length of stay of less than 2 midnights secondary to encephalopathy.    Total Time Spent on Date of Encounter in care of patient: 75 mins. This time was spent on one or more of the following: performing physical exam; counseling and coordination of care; obtaining or reviewing history; documenting in the medical record; reviewing/ordering tests, medications or procedures; communicating with other healthcare professionals and discussing with patient's family/caregivers.    Chief Complaint: Dysarthria, somnolence    History of Present Illness:  Kyler Almaguer is a 58 y.o. male with a PMH of self-reported TIA/stroke, alcohol abuse history, history of crack cocaine abuse, type 2 diabetes mellitus, hypertension, COPD who presents with dysarthria, reported right-sided weakness, chest pain and somnolence.  Patient initially presented as a stroke alert however initial workup negative for stroke and neurology suspects TME.  Patient is a poor historian and HPI obtained from chart review and family.  Per wife, patient has been on crack cocaine and alcohol for the last 1 month and has been at the alcohol rehab center since 6/7.  Patient has been admitted at rehab frequently in the past.  This morning, patient was noted to have slurred speech with right-sided weakness prompting ED admission.  Of note, patient has been started on multiple new medications including Keppra, clonidine, Valium taper, Robaxin.  Patient was not a TNK candidate.  Patient received IV Narcan in the ED with no improvement in symptoms.  In the ED on my evaluation, patient remains somnolent and asked to go home however he quickly falls asleep.  Unable to obtain  further information for patient.    Review of Systems:  Review of Systems   Unable to perform ROS: Mental status change       Past Medical and Surgical History:   No past medical history on file.    No past surgical history on file.    Meds/Allergies:  Prior to Admission medications    Medication Sig Start Date End Date Taking? Authorizing Provider   albuterol (PROVENTIL HFA,VENTOLIN HFA) 90 mcg/act inhaler Inhale 2 puffs every 6 (six) hours as needed for wheezing   Yes Historical Provider, MD   aspirin 81 mg chewable tablet Chew 81 mg daily   Yes Historical Provider, MD   atorvastatin (LIPITOR) 40 mg tablet Take 40 mg by mouth daily 1/23/24  Yes Historical Provider, MD   cloNIDine (CATAPRES) 0.1 mg tablet Take 0.1 mg by mouth every 4 (four) hours as needed for high blood pressure   Yes Historical Provider, MD   clopidogrel (PLAVIX) 75 mg tablet Take 75 mg by mouth daily 1/23/24  Yes Historical Provider, MD   diazepam (VALIUM) 10 mg tablet Take 10 mg by mouth every 6 (six) hours as needed for anxiety   Yes Historical Provider, MD   doxazosin (CARDURA) 8 MG tablet Take 8 mg by mouth daily at bedtime   Yes Historical Provider, MD   gabapentin (NEURONTIN) 400 mg capsule Take 400 mg by mouth 3 (three) times a day   Yes Historical Provider, MD   hydrOXYzine pamoate (VISTARIL) 25 mg capsule Take 25 mg by mouth 3 (three) times a day as needed for itching   Yes Historical Provider, MD   ibuprofen (MOTRIN) 600 mg tablet Take by mouth every 6 (six) hours as needed for mild pain   Yes Historical Provider, MD   levETIRAcetam (KEPPRA) 750 mg tablet Take 750 mg by mouth 2 (two) times a day 6/7/24 6/17/24 Yes Historical Provider, MD   lisinopril (ZESTRIL) 20 mg tablet Take 40 mg by mouth daily 1/23/24  Yes Historical Provider, MD   metFORMIN (GLUCOPHAGE) 1000 MG tablet Take 1,000 mg by mouth 2 (two) times a day 1/23/24  Yes Historical Provider, MD   methocarbamol (ROBAXIN) 750 mg tablet Take 750 mg by mouth every 6 (six) hours as  needed for muscle spasms   Yes Historical Provider, MD   mirtazapine (REMERON SOL-TAB) 15 mg disintegrating tablet Take 15 mg by mouth daily at bedtime   Yes Historical Provider, MD   QUEtiapine (SEROquel) 50 mg tablet Take 50 mg by mouth daily at bedtime   Yes Historical Provider, MD   dutasteride (AVODART) 0.5 mg capsule Take 0.5 mg by mouth daily    Historical Provider, MD     I have reviewed home medications with a medical source (PCP, Pharmacy, other).  I discussed with [facility].    Allergies: Not on File    Social History:  Marital Status: /Civil Union   Substance Use History:   Social History     Substance and Sexual Activity   Alcohol Use Not on file     Social History     Tobacco Use   Smoking Status Not on file   Smokeless Tobacco Not on file     Social History     Substance and Sexual Activity   Drug Use Not on file       Family History:  History reviewed. No pertinent family history.    Physical Exam:     Vitals:   Blood Pressure: (!) 185/101 (06/11/24 1100)  Pulse: 80 (06/11/24 1100)  Temperature: 98.4 °F (36.9 °C) (06/11/24 1042)  Temp Source: Oral (06/11/24 1042)  Respirations: (!) 26 (06/11/24 1100)  Weight - Scale: 127 kg (279 lb 1.6 oz) (06/11/24 0930)  SpO2: 99 % (06/11/24 1100)    Physical Exam  Vitals reviewed.   Constitutional:       General: He is sleeping. He is not in acute distress.     Appearance: Normal appearance. He is not ill-appearing.      Comments: Arousable but quickly falls asleep again   HENT:      Head: Normocephalic and atraumatic.   Cardiovascular:      Rate and Rhythm: Normal rate and regular rhythm.      Heart sounds: Normal heart sounds.   Pulmonary:      Effort: Pulmonary effort is normal. No respiratory distress.   Abdominal:      General: Bowel sounds are normal.      Palpations: Abdomen is soft.      Tenderness: There is no abdominal tenderness.   Musculoskeletal:      Right lower leg: No edema.      Left lower leg: No edema.   Skin:      General: Skin is warm and dry.   Neurological:      Mental Status: He is easily aroused.          Additional Data:     Lab Results:  Results from last 7 days   Lab Units 06/11/24  1042   WBC Thousand/uL 6.01   HEMOGLOBIN g/dL 14.5   HEMATOCRIT % 43.6   PLATELETS Thousands/uL 221   SEGS PCT % 70   LYMPHO PCT % 19   MONO PCT % 8   EOS PCT % 2     Results from last 7 days   Lab Units 06/11/24  0921   SODIUM mmol/L 141   POTASSIUM mmol/L 4.2   CHLORIDE mmol/L 101   CO2 mmol/L 33*   BUN mg/dL 12   CREATININE mg/dL 0.73   ANION GAP mmol/L 7   CALCIUM mg/dL 9.5   GLUCOSE RANDOM mg/dL 211*         Results from last 7 days   Lab Units 06/11/24  0910   POC GLUCOSE mg/dl 213*     Lab Results   Component Value Date    HGBA1C 7.2 (H) 05/14/2023           Lines/Drains:  Invasive Devices       Peripheral Intravenous Line  Duration             Peripheral IV 06/11/24 Left Forearm <1 day                        Imaging: Reviewed radiology reports from this admission including: CT head  XR chest portable   Final Result by Delroy Costa MD (06/11 1156)      No active pulmonary disease.            Workstation performed: [id number]         CT stroke alert brain   Final Result by Yuri Toscano MD (06/11 0934)      No acute intracranial abnormality.      Findings were directly discussed with Abimael Caban at approximately 9:32 a.m. at 6/11/2024.      Workstation performed: [id number]         CTA stroke alert (head/neck)   Final Result by Yuri Toscano MD (06/11 0945)      Negative CTA head and neck for large vessel occlusion, dissection, aneurysm, or high-grade stenosis given motion artifact in mid neck and craniocervical junction regions.      Additional chronic/incidental findings as detailed above.      Findings were directly discussed with Abimael Caban at approximately 9:32 a.m. at 6/11/2024.                     Workstation performed: [id number]             EKG and Other Studies Reviewed on Admission:   EKG:  Personally Reviewed. NSR. HR 81.    ** Please Note: This note has been constructed using a voice recognition system. **

## 2024-06-11 NOTE — Clinical Note
Case was discussed with Dr. Lomeli and the patient's admission status was agreed to be Admission Status: observation status to the service of Dr. Lomeli .

## 2024-06-11 NOTE — ASSESSMENT & PLAN NOTE
Home medications: Lisinopril 40 mg daily, doxazosin 8 mg at bedtime  Continue home regimen as above

## 2024-06-11 NOTE — CONSULTS
Consultation - Stroke   Kyler Almaguer 58 y.o. male MRN: [id number]  Unit/Bed#: ED 17 Encounter: [id number]      Assessment & Plan     Encephalopathy  Assessment & Plan  58 y.o. male with self reported prior TIAs/CVAs (however no signs of prior stroke on CTH), HTN, HLD, DM2, COPD, tobacco use, history of cocaine use, and alcohol use currently at alcohol rehab (last drink reported to be on 6/7) who presented to [facility] on 6/11/2024 as a stroke alert from [facility] due to dysarthria, reported R sided weakness, chest pain, and somnolence.    Upon arrival to the ED, /97.  NIHSS 8 (2 LOC, 1 questions, 1 dysarthria, 4 drift in all extremities).  CT head with pura cisterna magna, but no acute intracranial abnormalities and no prior strokes.  CTA head/neck negative for LVO, dissection, aneurysm, or significant stenosis.  Patient was not a TNK candidate due to unclear last known normal.  (Patient was a poor historian and LKN frequently changed).  He had pinpoint pupils and somnolence, so received IV Narcan without significant improvement.    High suspicion for TME (recommend checking infectious/metabolic workup).  Of note, he has been on a valium taper for the last 4 days.  Low suspicion for stroke due to no focal deficits.   Cannot fully rule out seizure with postictal confusion, however this would likely be provoked from alcohol withdrawal.    Plan:  - Can defer any further neuroimaging at this time  - Recommend TME workup (CBC, CMP, ammonia, TSH, ABG, UDS, UA, coma panel, etc)  - If above workup is unremarkable and patient remains altered, can consider MRI brain   - Continue home aspirin 81 mg and Plavix 75 mg daily (unclear why patient is on DAPT)  In 2014, patient had R sided weakness, but MRI brain was negative for stroke.  Prior notes state that they could not fully rule out small brainstem stroke.  - Continue home Lipitor 40 mg daily  - PT/OT  - Continue to monitor and notify neurology with any changes.   -  Medical management and supportive care per primary team. Correction of any metabolic or infectious disturbances        Thrombolytic Decision: Patient not a candidate. Unclear time of onset outside appropriate time window.      Recommendations for outpatient neurological follow up have yet to be determined.    History of Present Illness     Reason for Consult / Principal Problem: slurred speech  Hx and PE limited by: poor historian and somnolent  Patient last known well: possibly 10:30 PM last night (unreliable historian)  Stroke alert called: 9:14 AM  Neurology time of arrival: immediate  HPI: Kyler Almaguer is a 58 y.o. male with self reported prior TIAs/CVAs (however no signs of prior stroke on CTH), HTN, HLD, DM2, COPD, tobacco use, history of cocaine use, and alcohol use currently at alcohol rehab (last drink reported to be on 6/7) who presented to \[facility]\"" on 6/11/2024 as a stroke alert from [facility] rehab due to dysarthria, reported R sided weakness, chest pain, and somnolence.    History obtained per chart review and patient, however patient is a poor historian.  Patient is currently at alcohol rehab and states his last drink was on Friday 6/7.  He denies any alcohol use or drug use since being in rehab.  His story frequently changes.  He initially states his LKN was 1 hour prior to arrival, but this answer changes each time the question is asked.  He thinks he last spoke to someone around 10:30 PM last night and speech was normal then.  This morning he ate breakfast and noted that his speech was slurred and he had right sided weakness.  He states he has had prior strokes/TIAs and this is similar to his prior TIAs.  However, per chart review, no history of stroke/TIA.  No chronic strokes noted on prior CT head.    Upon arrival to the ED, /97.  NIHSS 8 (2 LOC, 1 questions, 1 dysarthria, 4 drift in all extremities).  CT head with pura cisterna magna, but no acute intracranial abnormalities and no prior  strokes.  CTA head/neck negative for LVO, dissection, aneurysm, or significant stenosis.  Patient was not a TNK candidate due to unclear last known normal.      Patient has been on a valium taper.  He did receive valium at 7 AM today.  In the ED, patient was noted to have pinpoint pupils and somnolence, so received IV Narcan.  No significant improvement.    Per prior notes, in 2014, patient was admitted for R sided weakness.  MRI brain at the time was negative for acute stroke.  However, prior notes state that they could not fully rule out small brainstem stroke.    Inpatient consult to Neurology  Consult performed by: Lenora Collado PA-C  Consult ordered by: Janee Finley DO          Review of Systems   Unable to perform ROS: Acuity of condition   Neurological:  Positive for speech difficulty and weakness.       Historical Information   No past medical history on file.  No past surgical history on file.  Social History   Social History     Substance and Sexual Activity   Alcohol Use Not on file     Social History     Substance and Sexual Activity   Drug Use Not on file     No existing history information found.  No existing history information found.  Social History     Tobacco Use   Smoking Status Not on file   Smokeless Tobacco Not on file     Family History: No family history on file.    Review of previous medical records was completed. Reviewed prior notes, labs, CT head, CTA head/neck, prior CT head    Meds/Allergies   all current active meds have been reviewed, current meds:   No current facility-administered medications for this encounter.   , and PTA meds:   None       Not on File    Objective   Vitals:Blood pressure 151/88, pulse 80, resp. rate 12, weight 127 kg (279 lb 1.6 oz), SpO2 95%.,There is no height or weight on file to calculate BMI.  No intake or output data in the 24 hours ending 06/11/24 0955    Invasive Devices:   Invasive Devices       Peripheral Intravenous Line  Duration              Peripheral IV 06/11/24 Left Forearm <1 day                    Physical Exam  Vitals and nursing note reviewed.   Constitutional:       Appearance: He is obese.      Comments: Patient lying in bed, somnolent and needed repeated stimulation to arouse   HENT:      Head: Normocephalic and atraumatic.      Mouth/Throat:      Mouth: Mucous membranes are dry.      Pharynx: Oropharynx is clear.   Eyes:      Extraocular Movements: Extraocular movements intact.      Conjunctiva/sclera: Conjunctivae normal.      Comments: Pinpoint pupils   Pulmonary:      Effort: Pulmonary effort is normal.   Musculoskeletal:      Comments: Drift in all extremities   Skin:     General: Skin is warm and dry.   Neurological:      Comments: See full neuro exam below       Neurologic Exam     Mental Status   Patient is somnolent and requires repeated stimulation to arouse  Mild/moderate dysarthria  No aphasia  Intermittently follows simple commands  Oriented to age.  Incorrectly stated that the month was July.     Cranial Nerves   Pupils pinpoint and at max constriction  EOMs intact without nystagmus  Difficulty assessing visual fields due to mental status and patient frequently falling asleep.  Blink to threat intact bilaterally  Pinprick is sharp throughout  No facial asymmetry  Tongue midline     Motor Exam   Muscle bulk: normal  Drift in all extremities, but limbs do not hit bed.  When given encouragement 5/5 strength in BUEs except 4+/5 R  (effort dependent)  5/5 BLEs     Sensory Exam   Sensation to pinprick is sharp throughout  No extinction abnormalities with bilateral simultaneous stimuli     Gait, Coordination, and Reflexes     Gait  Gait: (deferred for patient's safety)  Patient had difficulty with coordination testing due to somnolence and frequently falling asleep while performing finger to nose  No resting tremor  No clinical seizure activity       NIHSS:  1a.Level of Consciousness: 2 = Not alert, requires repeated stimulation  "to attend   1b. LOC Questions: 1 = Answers one correctly   1c. LOC Commands: 0 = Obeys both correctly   2. Best Gaze: 0 = Normal   3. Visual: 0 = No visual field loss   4. Facial Palsy: 0=Normal symmetric movement   5a. Motor Right Arm: 1=Drift, limb holds 90 (or 45) degrees but drifts down before full 10 seconds: does not hit bed   5b. Motor Left Arm: 1=Drift, limb holds 90 (or 45) degrees but drifts down before full 10 seconds: does not hit bed   6a. Motor Right Le=Drift, limb holds 90 (or 45) degrees but drifts down before full 10 seconds: does not hit bed   6b. Motor Left Le=Drift, limb holds 90 (or 45) degrees but drifts down before full 10 seconds: does not hit bed   7. Limb Ataxia:  0=Absent   8. Sensory: 0=Normal; no sensory loss   9. Best Language:  0=No aphasia, normal   10. Dysarthria: 1=Mild to moderate, patient slurs at least some words and at worst, can be understood with some difficulty   11. Extinction and Inattention (formerly Neglect): 0=No abnormality   Total Score: 8     Time NIHSS was completed: 9:27 AM    Modified Murray Score:  Unable to determine currently, will gather additional data    Lab Results: I have personally reviewed pertinent reports.  , CBC:   , BMP/CMP:   Results from last 7 days   Lab Units 24  0921   SODIUM mmol/L 141   POTASSIUM mmol/L 4.2   CHLORIDE mmol/L 101   CO2 mmol/L 33*   BUN mg/dL 12   CREATININE mg/dL 0.73   CALCIUM mg/dL 9.5   EGFR ml/min/1.73sq m 101   , Vitamin B12:   , HgBA1C:   , TSH:   , Coagulation:   , Lipid Profile:   , Ammonia:   , Urinalysis:       Invalid input(s): \"URIBILINOGEN\", Drug Screen:   , Medication Drug Levels:       Invalid input(s): \"CARBAMAZEPINE\", \"OXCARBAZEPINE\"  Imaging Studies: I have personally reviewed pertinent reports.   and I have personally reviewed pertinent films in PACS  EKG, Pathology, and Other Studies: I have personally reviewed pertinent reports.   and I have personally reviewed pertinent films in PACS  VTE " Prophylaxis: Sequential compression device (Venodyne)     Code Status: No Order  Advance Directive and Living Will:      Power of :    POLST:      Counseling / Coordination of Care  Total Critical Care time spent 37 minutes excluding procedures, teaching and family updates.

## 2024-06-11 NOTE — QUICK NOTE
Patient reevaluated at bedside complaining of right-sided chest pain that worsens with movement and tender to palpation. Patient reports he fell 2 days ago and hit the corner for the table against his right chest. Appears MSK etiology.  Will order Tylenol, lidocaine patch and as needed oxycodone for pain relief. R/o rib fracture with rib series.

## 2024-06-11 NOTE — ASSESSMENT & PLAN NOTE
Lab Results   Component Value Date    HGBA1C 7.2 (H) 05/14/2023       Recent Labs     06/11/24  0910   POCGLU 213*       Blood Sugar Average: Last 72 hrs:  (P) 213    Home metformin held  Sliding scale insulin while inpatient with diabetic diet  Hypoglycemia protocol

## 2024-06-11 NOTE — ASSESSMENT & PLAN NOTE
Self-reported prior history of TIA/CVAs, hypertension, hyperlipidemia, diabetes, COPD, alcohol and cocaine use presenting from alcohol rehab as a stroke alert due to dysarthria, somnolence, right-sided weakness and chest pain  It appears patient was started on new medications at alcohol rehab starting 6/7 including Keppra 750 mg twice daily x 10 days, Valium 10 mg as needed, clonidine 0.1 mg as needed, ibuprofen as needed, Robaxin 750 mg as needed.  Concern for polypharmacy with recent initiation of medications above.  CT/CTA without signs of acute stroke  Neurology was consulted who suspected TME and therefore MRI is deferred at this moment  Awaiting further evaluation with TSH, UDS, UA  Hold Keppra for now, dose reduce gabapentin  CIWA protocol  PT/OT  Appreciate neurology recommendations

## 2024-06-12 ENCOUNTER — APPOINTMENT (OUTPATIENT)
Dept: RADIOLOGY | Facility: HOSPITAL | Age: 59
DRG: 091 | End: 2024-06-12
Attending: STUDENT IN AN ORGANIZED HEALTH CARE EDUCATION/TRAINING PROGRAM
Payer: COMMERCIAL

## 2024-06-12 VITALS
SYSTOLIC BLOOD PRESSURE: 184 MMHG | DIASTOLIC BLOOD PRESSURE: 121 MMHG | RESPIRATION RATE: 20 BRPM | BODY MASS INDEX: 37.8 KG/M2 | HEIGHT: 72 IN | WEIGHT: 279.1 LBS | TEMPERATURE: 97.8 F | OXYGEN SATURATION: 94 % | HEART RATE: 90 BPM

## 2024-06-12 LAB — GLUCOSE SERPL-MCNC: 175 MG/DL (ref 65–140)

## 2024-06-12 PROCEDURE — 82948 REAGENT STRIP/BLOOD GLUCOSE: CPT

## 2024-06-12 PROCEDURE — 97166 OT EVAL MOD COMPLEX 45 MIN: CPT

## 2024-06-12 PROCEDURE — 99239 HOSP IP/OBS DSCHRG MGMT >30: CPT | Performed by: INTERNAL MEDICINE

## 2024-06-12 PROCEDURE — 97162 PT EVAL MOD COMPLEX 30 MIN: CPT

## 2024-06-12 PROCEDURE — 71100 X-RAY EXAM RIBS UNI 2 VIEWS: CPT

## 2024-06-12 RX ORDER — ACETAMINOPHEN 325 MG/1
975 TABLET ORAL EVERY 8 HOURS SCHEDULED
Start: 2024-06-12

## 2024-06-12 RX ORDER — LORAZEPAM 1 MG/1
1 TABLET ORAL ONCE
Status: COMPLETED | OUTPATIENT
Start: 2024-06-12 | End: 2024-06-12

## 2024-06-12 RX ORDER — GABAPENTIN 100 MG/1
100 CAPSULE ORAL 3 TIMES DAILY
Start: 2024-06-12

## 2024-06-12 RX ORDER — CLONIDINE HYDROCHLORIDE 0.1 MG/1
0.1 TABLET ORAL ONCE
Status: COMPLETED | OUTPATIENT
Start: 2024-06-12 | End: 2024-06-12

## 2024-06-12 RX ORDER — LISINOPRIL 20 MG/1
20 TABLET ORAL EVERY EVENING
Status: ON HOLD
Start: 2024-06-12 | End: 2024-06-18

## 2024-06-12 RX ADMIN — ASPIRIN 81 MG CHEWABLE TABLET 81 MG: 81 TABLET CHEWABLE at 09:35

## 2024-06-12 RX ADMIN — ACETAMINOPHEN 975 MG: 325 TABLET, FILM COATED ORAL at 13:33

## 2024-06-12 RX ADMIN — OXYCODONE HYDROCHLORIDE 5 MG: 5 TABLET ORAL at 16:01

## 2024-06-12 RX ADMIN — ATORVASTATIN CALCIUM 40 MG: 40 TABLET, FILM COATED ORAL at 16:02

## 2024-06-12 RX ADMIN — DIAZEPAM 5 MG: 5 TABLET ORAL at 12:10

## 2024-06-12 RX ADMIN — LIDOCAINE 5% 1 PATCH: 700 PATCH TOPICAL at 09:35

## 2024-06-12 RX ADMIN — CLONIDINE HYDROCHLORIDE 0.1 MG: 0.1 TABLET ORAL at 12:24

## 2024-06-12 RX ADMIN — OXYCODONE HYDROCHLORIDE 5 MG: 5 TABLET ORAL at 06:13

## 2024-06-12 RX ADMIN — OXYCODONE HYDROCHLORIDE 5 MG: 5 TABLET ORAL at 10:28

## 2024-06-12 RX ADMIN — LISINOPRIL 40 MG: 20 TABLET ORAL at 09:35

## 2024-06-12 RX ADMIN — DIAZEPAM 5 MG: 5 TABLET ORAL at 06:13

## 2024-06-12 RX ADMIN — FINASTERIDE 5 MG: 5 TABLET, FILM COATED ORAL at 09:35

## 2024-06-12 RX ADMIN — CLOPIDOGREL BISULFATE 75 MG: 75 TABLET ORAL at 09:35

## 2024-06-12 RX ADMIN — GABAPENTIN 100 MG: 100 CAPSULE ORAL at 09:35

## 2024-06-12 RX ADMIN — LORAZEPAM 1 MG: 1 TABLET ORAL at 11:28

## 2024-06-12 RX ADMIN — INSULIN LISPRO 1 UNITS: 100 INJECTION, SOLUTION INTRAVENOUS; SUBCUTANEOUS at 11:29

## 2024-06-12 NOTE — PHYSICAL THERAPY NOTE
Physical Therapy Evaluation    Patient Name: Kyler Almaguer    Today's Date: 6/12/2024     Problem List  Principal Problem:    Encephalopathy  Active Problems:    Primary hypertension    Type 2 diabetes mellitus without complication, without long-term current use of insulin (HCC)    History of stroke    History of crack cocaine use    Alcohol abuse       Past Medical History  Past Medical History:   Diagnosis Date    COPD (chronic obstructive pulmonary disease) (HCC)     Diabetes mellitus (HCC)     Hypertension     Stroke (HCC)     TIA (transient ischemic attack)         Past Surgical History  History reviewed. No pertinent surgical history.        06/12/24 0815   PT Last Visit   PT Visit Date 06/12/24   Note Type   Note type Evaluation   Pain Assessment   Pain Assessment Tool 0-10   Pain Score 8   Pain Location/Orientation Location: [facility]   Hospital Pain Intervention(s) Repositioned;Ambulation/increased activity   Restrictions/Precautions   Weight Bearing Precautions Per Order No   Other Precautions Pain   Home Living   Type of Home House   Home Layout Multi-level   Home Equipment Other (Comment)  (denies DME)   Additional Comments Pt admitted from [facility]. Pt reports facility is one level with 4 MAGGY vs ramp with a walk in shower   Prior Function   Level of [place] Independent with ADLs;Independent with functional mobility;Independent with IADLS   Lives With Spouse   Receives Help From Family   IADLs Independent with meal prep;Independent with medication management   Falls in the last 6 months 0   General   Family/Caregiver Present No   Cognition   Overall Cognitive Status WFL   Arousal/Participation Alert   Attention Attends with cues to redirect   Orientation Level Oriented X4   Memory Within functional limits   Following Commands Follows one step commands without difficulty   Comments pt pleasant and cooeprative t/o PT session   RLE Assessment   RLE  Assessment WFL  (5/5 strength on MMT)   LLE Assessment   LLE Assessment WFL  (5/5 strength on MMT)   Coordination   Movements are Fluid and Coordinated 1   Finger to Nose & Finger to Finger  Intact   Light Touch   RLE Light Touch Grossly intact   LLE Light Touch Grossly intact   Bed Mobility   Supine to Sit Unable to assess   Sit to Supine Unable to assess   Additional Comments Pt sitting EOB upon arrival and returned to EOb following PT session   Transfers   Sit to Stand 7  Independent   Stand to Sit 7  Independent   Additional Comments no AD   Ambulation/Elevation   Gait pattern Wide HOLLIS;Step through pattern   Gait Assistance 7  Independent   Assistive Device None   Distance 100'   Balance   Static Sitting Normal   Dynamic Sitting Good   Static Standing Good   Dynamic Standing Fair +   Ambulatory Fair +   Endurance Deficit   Endurance Deficit No   Activity Tolerance   Activity Tolerance Patient tolerated treatment well   Medical Staff Made Aware OT Alin co-eval for DC planning   Nurse Made Aware RN cleared   Assessment   Prognosis Good   Problem List Pain   Assessment Pt seen for moderate (evolving) complexity PT evaluation. Pt with active PT eval/treat orders. Pt is a 58 y.o. male who was admitted to [facility] on 6/11 with primary dx of Encephalopathy. Pt  has a past medical history of COPD (chronic obstructive pulmonary disease) ([facility]), Diabetes mellitus ([facility]), Hypertension, Stroke ([facility]), and TIA (transient ischemic attack). . PT consulted to assess d/c needs. At baseline, pt resides with Spouse in [facility] however currently admitted from [facility] for alcohol rehab. Pt was not using AD PTA. Currently, upon initial examination, pt is IND for functional transfers and IND for ambulation with no AD. Pt was left sitting EOB at the end of PT session with all needs in reach. Pt with no questions or concerns regarding d/c home; appears to be functioning at/ near baseline mobility levels. Pt with no further  acute inpatient PT needs at this time- please re-consult if needed. PT to DC pt and recommends Home w/ no rehab needs. Encourage pt to ambulate at least 3-4x/day with restorative/ nursing staff while remaining in hospital. The patient's AM-PAC Basic Mobility Inpatient Short Form Raw Score is 24, Standardized Score is 57.68. A standardized score greater than 42.9 suggests the patient may benefit from discharge to home. Please also refer to the recommendation of the Physical Therapist for safe discharge planning.   Barriers to Discharge None   Goals   Patient Goals to go back to [employer] [facility]   PT Treatment Day 0   Plan   Treatment/Interventions Spoke to case management;Spoke to nursing;OT   PT Frequency Other (Comment)  (PT eval only- DC IPPT)   Discharge Recommendation   Rehab Resource Intensity Level, PT No post-acute rehabilitation needs   AM-PAC Basic Mobility Inpatient   Turning in Flat Bed Without Bedrails 4   Lying on Back to Sitting on Edge of Flat Bed Without Bedrails 4   Moving Bed to Chair 4   Standing Up From Chair Using Arms 4   Walk in Room 4   Climb 3-5 Stairs With Railing 4   Basic Mobility Inpatient Raw Score 24   Basic Mobility Standardized Score 57.68   [facility] Highest Level Of Mobility   JH-HLM Goal 8: Walk 250 feet or more   JH-HLM Achieved 7: Walk 25 feet or more   Modified Sutherlin Scale   Modified Sutherlin Scale 2   End of Consult   Patient Position at End of Consult Seated edge of bed;All needs within reach     Matilde Parr PT, DPT

## 2024-06-12 NOTE — CASE MANAGEMENT
Case Management Discharge Planning Note    Patient name Kyler Almaguer  Location CW2 212/CW2 212-01 MRN [id number]  : 1965 Date 2024       Current Admission Date: 2024  Current Admission Diagnosis:Encephalopathy   Patient Active Problem List    Diagnosis Date Noted Date Diagnosed    Encephalopathy 2024     Primary hypertension 2024     Type 2 diabetes mellitus without complication, without long-term current use of insulin (HCC) 2024     History of stroke 2024     History of crack cocaine use 2024     Alcohol abuse 2024       LOS (days): 0  Geometric Mean LOS (GMLOS) (days):   Days to GMLOS:     OBJECTIVE:            Current admission status: Observation   Preferred Pharmacy:   UNKNOWN - FOLLOW UP PRIOR TO DISCHARGE TO E-PRESCRIBE  No address on file      Primary Care Provider: No primary care provider on file.    Primary Insurance: [employer] MC REP  Secondary Insurance:     DISCHARGE DETAILS:    CM informed that patient was medically cleared. CM called [facility] with nursing at [facility]. Dina provided CM with fax number to have S faxed. Dina also informed CM that the  had informed her that patient would need to call [facility] for approval to return due to being at the facility with [facility] funding.   CM was transferred to the , Heather. Heather informed CM that they could not accept patient back without patient calling the [facility] first. Heather provided  with number for [facility].   CM went to patient's room and assisted patient with calling the [facility].   [facility] worker, Candice, called  and informed that he was approved for funding to return.   CM called [facility] to follow up. CM spoke to Dina and Dina informed that clinical was received and patient was approved to return. Dina informed that they do not have transportation available.

## 2024-06-12 NOTE — UTILIZATION REVIEW
"Initial Clinical Review    Admission: Date/Time/Statement:   Admission Orders (From admission, onward)       Ordered        06/11/24 1141  Place in Observation  Once                          Orders Placed This Encounter   Procedures    Place in Observation     Standing Status:   Standing     Number of Occurrences:   1     Order Specific Question:   Level of Care     Answer:   Med Surg [16]     ED Arrival Information       Expected   -    Arrival   6/11/2024 09:00    Acuity   Emergent              Means of arrival   Walk-In    Escorted by   Friend    Service   Hospitalist    Admission type   Emergency              Arrival complaint   SOB             Chief Complaint   Patient presents with    CVA/TIA-like Symptoms     Per pt he stated \"I think I had a TIA an hour ago, I have hx strokes and this is how I felt the last time.\" Pt feels his speech is garbled.        Initial Presentation: 58 y.o. male with a PMH of self-reported TIA/stroke, alcohol abuse history, history of crack cocaine abuse, type 2 diabetes mellitus, hypertension, COPD who presents with dysarthria, reported right-sided weakness, chest pain and somnolence.  Patient initially presented as a stroke alert however initial workup negative for stroke and neurology suspects TME.  Patient is a poor historian and HPI obtained from chart review and family.  Per wife, patient has been on crack cocaine and alcohol for the last 1 month and has been at the alcohol rehab center since 6/7.  Patient has been admitted at rehab frequently in the past.  This morning, patient was noted to have slurred speech with right-sided weakness prompting ED admission.  Of note, patient has been started on multiple new medications including Keppra, clonidine, Valium taper, Robaxin.  Patient was not a TNK candidate.  Patient received IV Narcan in the ED with no improvement in symptoms.  In the ED on my evaluation, patient remains somnolent and asked to go home however he quickly falls " asleep.  Unable to obtain further information for patient.     ADMIT OBSERVATION STATUS    Anticipated Length of Stay/Certification Statement:      Date:     Day 2:  Date:    Day 3: Has surpassed a 2nd midnight with active treatments and services.        ED Triage Vitals   Temperature Pulse Respirations Blood Pressure SpO2 Pain Score   06/11/24 1042 06/11/24 0930 06/11/24 0930 06/11/24 0930 06/11/24 0930 06/11/24 1315   98.4 °F (36.9 °C) 86 14 156/97 96 % 8     Weight (last 2 days)       Date/Time Weight    06/11/24 1300 127 (279.1)    06/11/24 0930 127 (279.1)            Vital Signs (last 3 days)       Date/Time Temp Pulse Resp BP MAP (mmHg) SpO2 Calculated FIO2 (%) - Nasal Cannula O2 Flow Rate (L/min) Nasal Cannula O2 Flow Rate (L/min) O2 Device Patient Position - Orthostatic VS [place] Coma Scale Score CIWA-Ar Total Pain    06/12/24 06:31:42 -- -- -- 161/95 117 -- -- -- -- -- -- -- 3 --    06/12/24 0613 -- -- -- -- -- -- -- -- -- -- -- -- -- 9    06/12/24 0200 -- 90 -- -- -- -- -- -- -- -- -- -- 4 --    06/11/24 2310 -- -- -- -- -- -- -- -- -- -- -- -- -- 8    06/11/24 2200 -- 82 -- -- -- -- -- -- -- -- -- -- 4 --    06/11/24 2157 -- -- -- -- -- -- -- -- -- -- -- -- -- Med Not Given for Pain - for MAR use only    06/11/24 2100 -- -- 14 -- -- -- -- -- -- -- -- -- -- --    06/11/24 2051 -- -- -- -- -- -- -- -- -- -- -- 14 -- No Pain    06/11/24 2000 -- -- -- -- -- -- -- -- -- -- -- -- 16 --    06/11/24 18:15:53 -- -- -- 199/121 147 -- -- -- -- -- -- -- -- --    06/11/24 1802 -- -- -- -- -- -- -- -- -- -- -- -- -- 10 - Worst Possible Pain    06/11/24 1600 -- -- -- 191/117 -- -- -- -- -- -- -- -- 2 --    06/11/24 1429 -- -- -- -- -- -- -- -- -- -- -- -- -- 8    06/11/24 1315 -- -- -- -- -- -- -- -- -- -- -- 14 -- 8    06/11/24 1300 -- 76 24 154/95 115 94 % -- -- -- -- -- -- 2 --    06/11/24 1236 -- -- -- -- -- 94 % -- 2 L/min -- Nasal cannula -- -- -- --    06/11/24 1100 -- 80 26 185/101 134 99 % -- -- -- Nasal  cannula Lying -- -- --    06/11/24 1055 -- -- -- -- -- -- -- -- -- -- -- 14 -- --    06/11/24 1042 98.4 °F (36.9 °C) -- -- -- -- -- -- -- -- -- -- -- -- --    06/11/24 1000 -- 74 20 153/99 120 97 % -- -- -- -- -- 14 -- --    06/11/24 0945 -- 78 20 151/97 118 98 % -- -- -- -- -- 14 -- --    06/11/24 0931 -- 80 12 151/88 -- 95 % 28 -- 2 L/min Nasal cannula -- -- -- --    06/11/24 0930 -- 86 14 156/97 -- 96 % -- -- -- Nasal cannula -- 14 -- --           CIWA-Ar Score       Row Name 06/12/24 06:31:42 06/12/24 0200 06/11/24 2200       CIWA-Ar    Pulse -- 90 82    Nausea and Vomiting 0 0 0    Tactile Disturbances 0 0 0    Tremor 1 2 1    Auditory Disturbances 0 0 0    Paroxysmal Sweats 0 0 0    Visual Disturbances 0 0 0    Anxiety 1 2 1    Headache, Fullness in Head 0 0 0    Agitation 1 0 2    Orientation and Clouding of Sensorium 0 0 0    CIWA-Ar Total 3 4 4      Row Name 06/11/24 2000 06/11/24 1600          CIWA-Ar    BP -- 191/117     Nausea and Vomiting 0 1     Tactile Disturbances 0 0     Tremor 3 0     Auditory Disturbances 0 0     Paroxysmal Sweats 2 0     Visual Disturbances 0 0     Anxiety 3 0     Headache, Fullness in Head 0 0     Agitation 7 1     Orientation and Clouding of Sensorium 1 0     CIWA-Ar Total 16 2                     Pertinent Labs/Diagnostic Test Results:   Radiology:  XR chest portable   Final Interpretation by Delroy Costa MD (06/11 1156)      No active pulmonary disease.            Workstation performed: [id number]         CT stroke alert brain   Final Interpretation by Yuri Toscano MD (06/11 0934)      No acute intracranial abnormality.      Findings were directly discussed with Abimael Caban at approximately 9:32 a.m. at 6/11/2024.      Workstation performed: [id number]         CTA stroke alert (head/neck)   Final Interpretation by Yuri Toscano MD (06/11 0945)      Negative CTA head and neck for large vessel occlusion, dissection, aneurysm, or high-grade stenosis  given motion artifact in mid neck and craniocervical junction regions.      Additional chronic/incidental findings as detailed above.      Findings were directly discussed with Abimael Caban at approximately 9:32 a.m. at 6/11/2024.                     Workstation performed: [id number]         XR ribs 2 vw right    (Results Pending)     Cardiology:  ECG 12 lead   Final Result by Evan Xavier MD (06/11 1738)   ** Age and gender specific ECG analysis **   Normal sinus rhythm   Normal ECG   When compared with ECG of 11-JUN-2024 09:33, (unconfirmed)   ST no longer depressed in Inferior leads   QT has shortened   Confirmed by Evan Xavier ([id number]) on 6/11/2024 5:38:40 PM      ECG 12 lead   Final Result by Evan Xavier MD (06/11 1738)   ** Age and gender specific ECG analysis **   Sinus rhythm   Nonspecific ST abnormality   Prolonged QT   Abnormal ECG   No previous ECGs available   Confirmed by Evan Xavier ([id number]) on 6/11/2024 5:38:26 PM        GI:  No orders to display           Results from last 7 days   Lab Units 06/11/24  1042   WBC Thousand/uL 6.01   HEMOGLOBIN g/dL 14.5   HEMATOCRIT % 43.6   PLATELETS Thousands/uL 221   TOTAL NEUT ABS Thousands/µL 4.21         Results from last 7 days   Lab Units 06/11/24  0921   SODIUM mmol/L 141   POTASSIUM mmol/L 4.2   CHLORIDE mmol/L 101   CO2 mmol/L 33*   ANION GAP mmol/L 7   BUN mg/dL 12   CREATININE mg/dL 0.73   EGFR ml/min/1.73sq m 101   CALCIUM mg/dL 9.5     Results from last 7 days   Lab Units 06/11/24  1042 06/11/24  0921   AST U/L  --  37   ALT U/L  --  79*   ALK PHOS U/L  --  84   TOTAL PROTEIN g/dL  --  6.7   ALBUMIN g/dL  --  3.9   TOTAL BILIRUBIN mg/dL  --  0.25   BILIRUBIN DIRECT mg/dL  --  0.08   AMMONIA umol/L 30  --      Results from last 7 days   Lab Units 06/11/24  1642 06/11/24  1323 06/11/24  0910   POC GLUCOSE mg/dl 193* 134 213*     Results from last 7 days   Lab Units 06/11/24  0921   GLUCOSE RANDOM mg/dL 211*         Results from last 7 days   Lab Units  "06/11/24  1326   HEMOGLOBIN A1C % 7.2*   EAG mg/dl 160     No results found for: \"BETA-HYDROXYBUTYRATE\"       Results from last 7 days   Lab Units 06/11/24  0921   PH SERENITY  7.343   PCO2 SERENITY mm Hg 58.6*   PO2 SERENITY mm Hg 42.9   HCO3 SERENITY mmol/L 31.1*   BASE EXC SERENITY mmol/L 3.6   O2 CONTENT SERENITY ml/dL 16.9   O2 HGB, VENOUS % 76.3             Results from last 7 days   Lab Units 06/11/24  1151 06/11/24  0921   HS TNI 0HR ng/L  --  16   HS TNI 2HR ng/L 16  --    HSTNI D2 ng/L 0  --              Results from last 7 days   Lab Units 06/11/24  0921   TSH 3RD GENERATON uIU/mL 37.500*                                                         Results from last 7 days   Lab Units 06/11/24  1243   CLARITY UA  Clear   COLOR UA  Light Yellow   SPEC GRAV UA  >=1.050*   PH UA  6.5   GLUCOSE UA mg/dl Negative   KETONES UA mg/dl Negative   BLOOD UA  Negative   PROTEIN UA mg/dl Trace*   NITRITE UA  Negative   BILIRUBIN UA  Negative   UROBILINOGEN UA (BE) mg/dl <2.0   LEUKOCYTES UA  Negative   WBC UA /hpf 1-2   RBC UA /hpf None Seen   BACTERIA UA /hpf None Seen   EPITHELIAL CELLS WET PREP /hpf Occasional   MUCUS THREADS  Occasional*             Results from last 7 days   Lab Units 06/11/24  1243   AMPH/METH  Negative   BARBITURATE UR  Negative   BENZODIAZEPINE UR  Positive*   COCAINE UR  Negative   METHADONE URINE  Negative   OPIATE UR  Negative   PCP UR  Negative   THC UR  Negative     Results from last 7 days   Lab Units 06/11/24  0941   ETHANOL LVL mg/dL <10   ACETAMINOPHEN LVL ug/mL <2*   SALICYLATE LVL mg/dL <5                                   ED Treatment-Medication Administration from 06/11/2024 0859 to 06/11/2024 1216         Date/Time Order Dose Route Action     06/11/2024 0918 naloxone (NARCAN) 0.04 mg/mL syringe 0.04 mg 0.04 mg Intravenous Given     06/11/2024 1012 albuterol inhalation solution 5 mg 5 mg Nebulization Given     06/11/2024 1012 ipratropium (ATROVENT) 0.02 % inhalation solution 0.5 mg 0.5 mg Nebulization Given     " 06/11/2024 0937 iohexol (OMNIPAQUE) 350 MG/ML injection (MULTI-DOSE) 85 mL 85 mL Intravenous Given            Past Medical History:   Diagnosis Date    COPD (chronic obstructive pulmonary disease) (HCC)     Diabetes mellitus (HCC)     Hypertension     Stroke (HCC)     TIA (transient ischemic attack)      Present on Admission:   Encephalopathy      Admitting Diagnosis: Slurred speech [R47.81]  Stroke-like symptoms [R29.90]  AMS (altered mental status) [R41.82]  Age/Sex: 58 y.o. male  Admission Orders:  Scheduled Medications:  acetaminophen, 975 mg, Oral, Q8H FLORESITA  aspirin, 81 mg, Oral, Daily  atorvastatin, 40 mg, Oral, Daily With Dinner  clopidogrel, 75 mg, Oral, Daily  doxazosin, 8 mg, Oral, HS  enoxaparin, 40 mg, Subcutaneous, Daily  finasteride, 5 mg, Oral, Daily  gabapentin, 100 mg, Oral, TID  insulin lispro, 1-5 Units, Subcutaneous, TID AC  lidocaine, 1 patch, Topical, Daily  lisinopril, 40 mg, Oral, Daily  mirtazapine, 15 mg, Oral, HS  QUEtiapine, 50 mg, Oral, HS      Continuous IV Infusions:     PRN Meds:  albuterol, 2 puff, Inhalation, Q6H PRN  diazepam, 5 mg, Oral, Q6H PRN  labetalol, 10 mg, Intravenous, Q6H PRN  oxyCODONE, 5 mg, Oral, Q4H PRN        IP CONSULT TO NEUROLOGY  IP CONSULT TO CASE MANAGEMENT    Network Utilization Review Department  ATTENTION: Please call with any questions or concerns to [phone] and carefully listen to the prompts so that you are directed to the right person. All voicemails are confidential.   For Discharge needs, contact Care Management DC Support Team at [phone] opt. [phone]  Send all requests for admission clinical reviews, approved or denied determinations and any other requests to dedicated fax number below belonging to the campus where the patient is receiving treatment. List of dedicated fax numbers for the Facilities:  FACILITY NAME UR FAX NUMBER   ADMISSION DENIALS (Administrative/Medical Necessity) [phone]   DISCHARGE SUPPORT TEAM (NETWORK) [phone]    PARENT CHILD HEALTH (Maternity/NICU/Pediatrics) [phone]   [facility] [phone]   [facility] [phone]   [facility] [phone]   [facility] [phone]   [facility] [phone]   [facility] [phone]   [facility] [phone]   [facility] [phone]   [facility] [phone]   [facility] [phone]   [facility] [phone]   [facility] [phone]

## 2024-06-12 NOTE — OCCUPATIONAL THERAPY NOTE
Occupational Therapy Evaluation     Patient Name: Kyler Almaguer  Today's Date: 6/12/2024  Problem List  Principal Problem:    Encephalopathy  Active Problems:    Primary hypertension    Type 2 diabetes mellitus without complication, without long-term current use of insulin (HCC)    History of stroke    History of crack cocaine use    Alcohol abuse    Past Medical History  Past Medical History:   Diagnosis Date    COPD (chronic obstructive pulmonary disease) (HCC)     Diabetes mellitus (HCC)     Hypertension     Stroke (HCC)     TIA (transient ischemic attack)      Past Surgical History  History reviewed. No pertinent surgical history.      06/12/24 0826   OT Last Visit   OT Visit Date 06/12/24   Note Type   Note type Evaluation   Pain Assessment   Pain Assessment Tool 0-10   Pain Score 8   Pain Location/Orientation Location: [facility] Pain Intervention(s)   (RN Notified)   Restrictions/Precautions   Weight Bearing Precautions Per Order No   Other Precautions Pain   Home Living   Type of Home House   Home Layout Multi-level   Home Equipment   (denies)   Additional Comments Pt admitted from [facility]. Pt reports facility is one level with 4 MAGGY vs ramp with a walk in shower   Prior Function   Level of [place] Independent with ADLs;Independent with functional mobility;Independent with IADLS   Lives With Spouse   Receives Help From Family   IADLs Independent with meal prep;Independent with medication management   Falls in the last 6 months 0   Lifestyle   Autonomy Pt reports (I) with ADLs, IADLs, and functional mobility at baseline   Reciprocal Relationships family and facility staff   ADL   Where Assessed Edge of bed   Eating Assistance 7  Independent   Grooming Assistance 7  Independent   UB Bathing Assistance 7  Independent   LB Bathing Assistance 7  Independent   UB Dressing Assistance 7  Independent   LB Dressing Assistance 7  Independent   Toileting Assistance  7  Independent   Functional  Assistance 7  Independent   Bed Mobility   Additional Comments Pt seated EOB upon OT arrival   Transfers   Sit to Stand 7  Independent   Stand to Sit 7  Independent   Additional Comments no AD   Functional Mobility   Functional Mobility 7  Independent   Additional Comments Pt (I) with ambulation of hosuehold distance functional mobility without AD   Balance   Static Sitting Normal   Dynamic Sitting Normal   Static Standing Normal   Dynamic Standing Normal   Ambulatory Normal   Activity Tolerance   Activity Tolerance Patient tolerated treatment well   Medical Staff Made Aware PT   Nurse Made Aware RN Cleared   RUE Assessment   RUE Assessment WFL   LUE Assessment   LUE Assessment WFL   Hand Function   Gross Motor Coordination Functional   Fine Motor Coordination Functional   Sensation   Additional Comments Pt reports no changes in sensation   Cognition   Overall Cognitive Status WFL   Arousal/Participation Alert;Responsive;Cooperative   Attention Attends with cues to redirect   Orientation Level Oriented X4   Memory Within functional limits   Following Commands Follows one step commands without difficulty   Comments Pt agreeable to therapy   Assessment   Prognosis Good   Assessment Pt is a 59 y/o male that was admitted to [facility] 6/11/2024 with encephalopathy. Pt  has a past medical history of COPD (chronic obstructive pulmonary disease) ([facility]), Diabetes mellitus ([facility]), Hypertension, Stroke ([facility]), and TIA (transient ischemic attack). Pt admitted from [facility]. Pt reports facility is one level with 4 MAGGY vs ramp with a walk in shower. Pt lives with spouse in a 3 level home. Pt reports using no AD at baseline. Prior to admission pt (I) ADLs, IADLs, and functional mobility. Pt currently (I) with ADLs, functional mobility, and functional transfers without AD. Pt limited by decreased ADL status, functional transfers, functional mobility, and activity tolerance. Pt seated EOB at begning of session, pt seated  EIV at end of session with items within reach. The patient's raw score on the AM-PAC Daily Activity Inpatient Short Form is 24. A raw score of greater than or equal to 19 suggests the patient may benefit from discharge to home. Please refer to the recommendation of the Occupational Therapist for safe discharge planning. Pt appears to be functioning at/close to baseline, further OT services not indicated at this time. Will d/c OT services, please re-consult if OT needs arise.   Goals   Patient Goals to go back to [facility]   Discharge Recommendation   Rehab Resource Intensity Level, OT No post-acute rehabilitation needs   AM-PAC Daily Activity Inpatient   Lower Body Dressing 4   Bathing 4   Toileting 4   Upper Body Dressing 4   Grooming 4   Eating 4   Daily Activity Raw Score 24   Daily Activity Standardized Score (Calc for Raw Score >=11) 57.54   AM-PAC Applied Cognition Inpatient   Following a Speech/Presentation 3   Understanding Ordinary Conversation 4   Taking Medications 4   Remembering Where Things Are Placed or Put Away 4   Remembering List of 4-5 Errands 4   Taking Care of Complicated Tasks 3   Applied Cognition Raw Score 22   Applied Cognition Standardized Score 47.83   End of Consult   Education Provided Yes   Patient Position at End of Consult Seated edge of bed;All needs within reach   Nurse Communication Nurse aware of consult     ROBERT Crocker, OTR/L

## 2024-06-12 NOTE — ASSESSMENT & PLAN NOTE
Self-reported prior history of TIA/CVAs, hypertension, hyperlipidemia, diabetes, COPD, alcohol and cocaine use presenting from alcohol rehab as a stroke alert due to dysarthria, somnolence, right-sided weakness and chest pain  Patient admitted with suspected polypharmacy.  It appears patient was started on new medications at alcohol rehab starting 6/7 including Keppra 750 mg twice daily x 10 days, Valium 10 mg as needed, clonidine 0.1 mg as needed, ibuprofen as needed, Robaxin 750 mg as needed.    CT/CTA without signs of acute stroke  Neurology was consulted who suspected TME and therefore MRI is deferred at this moment  Awaiting further evaluation with TSH, UDS, UA  Hold Keppra for now, dose reduce gabapentin  CIWA protocol  PT/OT  Appreciate neurology recommendations

## 2024-06-12 NOTE — ASSESSMENT & PLAN NOTE
Lab Results   Component Value Date    HGBA1C 7.2 (H) 06/11/2024       Recent Labs     06/11/24  0910 06/11/24  1323 06/11/24  1642   POCGLU 213* 134 193*         Blood Sugar Average: Last 72 hrs:  (P) 180    Home metformin held  Sliding scale insulin while inpatient with diabetic diet  Hypoglycemia protocol

## 2024-06-12 NOTE — CASE MANAGEMENT
Case Management Discharge Planning Note    Patient name Kyler Almaguer  Location 2 212/CW2 212-01 MRN [id number]  : 1965 Date 2024       Current Admission Date: 2024  Current Admission Diagnosis:Encephalopathy   Patient Active Problem List    Diagnosis Date Noted Date Diagnosed    Encephalopathy 2024     Primary hypertension 2024     Type 2 diabetes mellitus without complication, without long-term current use of insulin (HCC) 2024     History of stroke 2024     History of crack cocaine use 2024     Alcohol abuse 2024       LOS (days): 0  Geometric Mean LOS (GMLOS) (days):   Days to GMLOS:     OBJECTIVE:            Current admission status: Observation   Preferred Pharmacy:   UNKNOWN - FOLLOW UP PRIOR TO DISCHARGE TO E-PRESCRIBE  No address on file      Primary Care Provider: No primary care provider on file.    Primary Insurance: [facility]  Secondary Insurance:     DISCHARGE DETAILS:    CM met with patient at bedside and introduced self and explained role. Patient reports that he wants to return to [facility] at discharge. Patient provided CM with contact numbers for [facility]. CM called [facility] and spoke with nurseDina. Dina informed that patient would need to be medically cleared to be able to return to facility and that AVS would need to be faxed to them when patient is cleared.   Contact number for [facility] is [phone].

## 2024-06-12 NOTE — DISCHARGE SUMMARY
[facility]  Discharge- [facility] 1965, 58 y.o. male MRN: [id number]  Unit/Bed#: CW2 212-01 Encounter: [id number]  Primary Care Provider: No primary care provider on file.   Date and time admitted to hospital: 6/11/2024  9:04 AM    * Encephalopathy  Assessment & Plan  Self-reported prior history of TIA/CVAs, hypertension, hyperlipidemia, diabetes, COPD, alcohol and cocaine use presenting from alcohol rehab as a stroke alert due to dysarthria, somnolence, right-sided weakness and chest pain  Patient admitted with suspected polypharmacy.  It appears patient was started on new medications at alcohol rehab starting 6/7 including Keppra 750 mg twice daily x 10 days, Valium 10 mg as needed, clonidine 0.1 mg as needed, ibuprofen as needed, Robaxin 750 mg as needed.    CT/CTA without signs of acute stroke  Neurology was consulted who suspected TME and therefore MRI is deferred at this moment  Awaiting further evaluation with TSH, UDS, UA  Hold Keppra for now, dose reduce gabapentin  CIWA protocol  PT/OT  Appreciate neurology recommendations    History of crack cocaine use  Assessment & Plan  Reported by family  Monitor    History of stroke  Assessment & Plan  On aspirin and Plavix  Neurology following, appreciate recommendations    Type 2 diabetes mellitus without complication, without long-term current use of insulin (HCC)  Assessment & Plan  Lab Results   Component Value Date    HGBA1C 7.2 (H) 06/11/2024       Recent Labs     06/11/24  0910 06/11/24  1323 06/11/24  1642   POCGLU 213* 134 193*         Blood Sugar Average: Last 72 hrs:  (P) 180    Home metformin held  Sliding scale insulin while inpatient with diabetic diet  Hypoglycemia protocol    Primary hypertension  Assessment & Plan  Home medications: Lisinopril 40 mg daily, doxazosin 8 mg at bedtime  Continue home regimen as above              Medical Problems       Resolved Problems  Date Reviewed: 6/11/2024   None    Outpatient Instructions for Monitored Anesthesia Care (MAC)    1. You will be released from the hospital in the care of a responsible adult who should remain with you for at least 6 hours.    2. You are at an increased risk for falls following anesthesia. Use care when changing from a lying to a sitting position. Use your assistive devices ( example: cane, walker or family member).    3. You must NOT drive a car, climb high places such as a ladder, or operate equipment such as electric knives,stoves etc...for at least 12 hours. If you are dizzy for longer than 24 hours, notify your doctor.    4. DO NOT drink any alcoholic beverages for at least 24 hours. Anesthesia may impair your judgement.    5. If you smoke, do not smoke alone due to increased risk of burns/fires.    6. DO NOT undertake any legally binding commitment for at least 24 hours. Anesthesia may impair your judgement.           Admission Date:   Admission Orders (From admission, onward)       Ordered        06/11/24 1141  Place in Observation  Once                            Admitting Diagnosis: Slurred speech [R47.81]  Stroke-like symptoms [R29.90]  AMS (altered mental status) [R41.82]    HPI: This is a very pleasant 58-year-old male with past medical history of reported TIA/stroke, history of alcohol abuse, history of crack cocaine abuse, history of type 2 diabetes hypertension COPD who presents to the hospital with dysarthria symptoms with reported right-sided weakness chest pain and somnolence on presentation to the hospital.  Patient initially presented as a stroke alert however initial workup was negative for stroke and neurology suspected toxic metabolic encephalopathy likely secondary to polypharmacy.    Procedures Performed: No orders of the defined types were placed in this encounter.      Summary of Hospital Course:   Of note, patient has been treated at rehab.  He was placed on Keppra Robaxin clonidine and Valium.  Neurology suspected possible polypharmacy given multiple new agents versus benzo withdrawal in the setting of detox for alcohol.  Keppra was held here      Discharge instructions/Information to patient and family:   See after visit summary for information provided to patient and family.      Provisions for Follow-Up Care:  See after visit summary for information related to follow-up care and any pertinent home health orders.      PCP: No primary care provider on file.    Disposition: Home    Planned Readmission: No    Discharge Statement   I spent 85 minutes discharging the patient. This time was spent on the day of discharge. I had direct contact with the patient on the day of discharge. Additional documentation is required if more than 30 minutes were spent on discharge.     Discharge Medications:  See after visit summary for reconciled discharge medications provided to patient and family.

## 2024-06-13 ENCOUNTER — APPOINTMENT (EMERGENCY)
Dept: RADIOLOGY | Facility: HOSPITAL | Age: 59
DRG: 091 | End: 2024-06-13
Payer: COMMERCIAL

## 2024-06-13 ENCOUNTER — HOSPITAL ENCOUNTER (INPATIENT)
Facility: HOSPITAL | Age: 59
LOS: 5 days | Discharge: OTHER INSTITUTION | DRG: 189 | End: 2024-06-18
Attending: EMERGENCY MEDICINE | Admitting: STUDENT IN AN ORGANIZED HEALTH CARE EDUCATION/TRAINING PROGRAM
Payer: COMMERCIAL

## 2024-06-13 ENCOUNTER — HOSPITAL ENCOUNTER (INPATIENT)
Facility: HOSPITAL | Age: 59
LOS: 1 days | Discharge: LEFT BEFORE DISCHARGE | DRG: 091 | End: 2024-06-13
Attending: EMERGENCY MEDICINE | Admitting: INTERNAL MEDICINE
Payer: COMMERCIAL

## 2024-06-13 VITALS
DIASTOLIC BLOOD PRESSURE: 92 MMHG | HEART RATE: 77 BPM | SYSTOLIC BLOOD PRESSURE: 177 MMHG | OXYGEN SATURATION: 90 % | TEMPERATURE: 97.9 F | RESPIRATION RATE: 20 BRPM

## 2024-06-13 DIAGNOSIS — J44.1 COPD WITH ACUTE EXACERBATION (HCC): Primary | ICD-10-CM

## 2024-06-13 DIAGNOSIS — R47.81 SLURRED SPEECH: ICD-10-CM

## 2024-06-13 DIAGNOSIS — Z86.69 HISTORY OF ENCEPHALOPATHY: ICD-10-CM

## 2024-06-13 DIAGNOSIS — R06.89 HYPERCAPNEMIA: ICD-10-CM

## 2024-06-13 DIAGNOSIS — I10 UNCONTROLLED HYPERTENSION: ICD-10-CM

## 2024-06-13 DIAGNOSIS — F10.939 ALCOHOL WITHDRAWAL (HCC): ICD-10-CM

## 2024-06-13 DIAGNOSIS — R41.82 ALTERED MENTAL STATUS: Primary | ICD-10-CM

## 2024-06-13 PROBLEM — J96.01 ACUTE RESPIRATORY FAILURE WITH HYPOXIA AND HYPERCAPNIA (HCC): Status: ACTIVE | Noted: 2024-06-13

## 2024-06-13 PROBLEM — J96.02 ACUTE RESPIRATORY FAILURE WITH HYPOXIA AND HYPERCAPNIA (HCC): Status: ACTIVE | Noted: 2024-06-13

## 2024-06-13 PROBLEM — S22.39XA RIB FRACTURE: Status: ACTIVE | Noted: 2024-06-13

## 2024-06-13 LAB
2HR DELTA HS TROPONIN: -2 NG/L
4HR DELTA HS TROPONIN: -4 NG/L
ANION GAP SERPL CALCULATED.3IONS-SCNC: 7 MMOL/L (ref 4–13)
ATRIAL RATE: 76 BPM
ATRIAL RATE: 78 BPM
ATRIAL RATE: 96 BPM
B PARAP IS1001 DNA NPH QL NAA+NON-PROBE: NOT DETECTED
B PERT.PT PRMT NPH QL NAA+NON-PROBE: NOT DETECTED
BASE EX.OXY STD BLDV CALC-SCNC: 73.8 % (ref 60–80)
BASE EX.OXY STD BLDV CALC-SCNC: 85.5 % (ref 60–80)
BASE EX.OXY STD BLDV CALC-SCNC: 89.2 % (ref 60–80)
BASE EXCESS BLDV CALC-SCNC: 3.2 MMOL/L
BASE EXCESS BLDV CALC-SCNC: 3.8 MMOL/L
BASE EXCESS BLDV CALC-SCNC: 4.4 MMOL/L
BASOPHILS # BLD AUTO: 0.03 THOUSANDS/ÂΜL (ref 0–0.1)
BASOPHILS NFR BLD AUTO: 1 % (ref 0–1)
BNP SERPL-MCNC: 20 PG/ML (ref 0–100)
BUN SERPL-MCNC: 13 MG/DL (ref 5–25)
C PNEUM DNA NPH QL NAA+NON-PROBE: NOT DETECTED
CALCIUM SERPL-MCNC: 9.1 MG/DL (ref 8.4–10.2)
CARDIAC TROPONIN I PNL SERPL HS: 13 NG/L
CARDIAC TROPONIN I PNL SERPL HS: 15 NG/L
CARDIAC TROPONIN I PNL SERPL HS: 17 NG/L
CHLORIDE SERPL-SCNC: 101 MMOL/L (ref 96–108)
CO2 SERPL-SCNC: 32 MMOL/L (ref 21–32)
CREAT SERPL-MCNC: 0.78 MG/DL (ref 0.6–1.3)
CRP SERPL QL: 11 MG/L
EOSINOPHIL # BLD AUTO: 0.14 THOUSAND/ÂΜL (ref 0–0.61)
EOSINOPHIL NFR BLD AUTO: 3 % (ref 0–6)
ERYTHROCYTE [DISTWIDTH] IN BLOOD BY AUTOMATED COUNT: 12.4 % (ref 11.6–15.1)
EST. AVERAGE GLUCOSE BLD GHB EST-MCNC: 154 MG/DL
FLUAV RNA NPH QL NAA+NON-PROBE: NOT DETECTED
FLUBV RNA NPH QL NAA+NON-PROBE: NOT DETECTED
GFR SERPL CREATININE-BSD FRML MDRD: 98 ML/MIN/1.73SQ M
GLUCOSE SERPL-MCNC: 168 MG/DL (ref 65–140)
GLUCOSE SERPL-MCNC: 189 MG/DL (ref 65–140)
GLUCOSE SERPL-MCNC: 320 MG/DL (ref 65–140)
GLUCOSE SERPL-MCNC: 355 MG/DL (ref 65–140)
HADV DNA NPH QL NAA+NON-PROBE: NOT DETECTED
HBA1C MFR BLD: 7 %
HCO3 BLDV-SCNC: 29.6 MMOL/L (ref 24–30)
HCO3 BLDV-SCNC: 31.1 MMOL/L (ref 24–30)
HCO3 BLDV-SCNC: 34 MMOL/L (ref 24–30)
HCOV 229E RNA NPH QL NAA+NON-PROBE: NOT DETECTED
HCOV HKU1 RNA NPH QL NAA+NON-PROBE: NOT DETECTED
HCOV NL63 RNA NPH QL NAA+NON-PROBE: NOT DETECTED
HCOV OC43 RNA NPH QL NAA+NON-PROBE: NOT DETECTED
HCT VFR BLD AUTO: 44.5 % (ref 36.5–49.3)
HGB BLD-MCNC: 14.6 G/DL (ref 12–17)
HMPV RNA NPH QL NAA+NON-PROBE: NOT DETECTED
HPIV1 RNA NPH QL NAA+NON-PROBE: NOT DETECTED
HPIV2 RNA NPH QL NAA+NON-PROBE: NOT DETECTED
HPIV3 RNA NPH QL NAA+NON-PROBE: NOT DETECTED
HPIV4 RNA NPH QL NAA+NON-PROBE: NOT DETECTED
IMM GRANULOCYTES # BLD AUTO: 0.02 THOUSAND/UL (ref 0–0.2)
IMM GRANULOCYTES NFR BLD AUTO: 0 % (ref 0–2)
LYMPHOCYTES # BLD AUTO: 1.7 THOUSANDS/ÂΜL (ref 0.6–4.47)
LYMPHOCYTES NFR BLD AUTO: 35 % (ref 14–44)
M PNEUMO DNA NPH QL NAA+NON-PROBE: NOT DETECTED
MCH RBC QN AUTO: 30.3 PG (ref 26.8–34.3)
MCHC RBC AUTO-ENTMCNC: 32.8 G/DL (ref 31.4–37.4)
MCV RBC AUTO: 92 FL (ref 82–98)
MONOCYTES # BLD AUTO: 0.49 THOUSAND/ÂΜL (ref 0.17–1.22)
MONOCYTES NFR BLD AUTO: 10 % (ref 4–12)
NEUTROPHILS # BLD AUTO: 2.45 THOUSANDS/ÂΜL (ref 1.85–7.62)
NEUTS SEG NFR BLD AUTO: 51 % (ref 43–75)
NRBC BLD AUTO-RTO: 0 /100 WBCS
O2 CT BLDV-SCNC: 16.7 ML/DL
O2 CT BLDV-SCNC: 18.1 ML/DL
O2 CT BLDV-SCNC: 19.8 ML/DL
P AXIS: 60 DEGREES
P AXIS: 72 DEGREES
P AXIS: 75 DEGREES
PCO2 BLDV: 48.5 MM HG (ref 42–50)
PCO2 BLDV: 61.4 MM HG (ref 42–50)
PCO2 BLDV: 72.4 MM HG (ref 42–50)
PH BLDV: 7.29 [PH] (ref 7.3–7.4)
PH BLDV: 7.32 [PH] (ref 7.3–7.4)
PH BLDV: 7.4 [PH] (ref 7.3–7.4)
PLATELET # BLD AUTO: 236 THOUSANDS/UL (ref 149–390)
PMV BLD AUTO: 10.6 FL (ref 8.9–12.7)
PO2 BLDV: 43.6 MM HG (ref 35–45)
PO2 BLDV: 57.4 MM HG (ref 35–45)
PO2 BLDV: 61.2 MM HG (ref 35–45)
POTASSIUM SERPL-SCNC: 3.8 MMOL/L (ref 3.5–5.3)
PR INTERVAL: 134 MS
PR INTERVAL: 134 MS
PR INTERVAL: 144 MS
PROCALCITONIN SERPL-MCNC: 0.11 NG/ML
QRS AXIS: 46 DEGREES
QRS AXIS: 51 DEGREES
QRS AXIS: 66 DEGREES
QRSD INTERVAL: 100 MS
QRSD INTERVAL: 102 MS
QRSD INTERVAL: 106 MS
QT INTERVAL: 374 MS
QT INTERVAL: 404 MS
QT INTERVAL: 442 MS
QTC INTERVAL: 460 MS
QTC INTERVAL: 472 MS
QTC INTERVAL: 497 MS
RBC # BLD AUTO: 4.82 MILLION/UL (ref 3.88–5.62)
RSV RNA NPH QL NAA+NON-PROBE: NOT DETECTED
RV+EV RNA NPH QL NAA+NON-PROBE: NOT DETECTED
S PNEUM AG UR QL: NEGATIVE
SARS-COV-2 RNA NPH QL NAA+NON-PROBE: NOT DETECTED
SODIUM SERPL-SCNC: 140 MMOL/L (ref 135–147)
T WAVE AXIS: 40 DEGREES
T WAVE AXIS: 58 DEGREES
T WAVE AXIS: 70 DEGREES
VENTRICULAR RATE: 76 BPM
VENTRICULAR RATE: 78 BPM
VENTRICULAR RATE: 96 BPM
WBC # BLD AUTO: 4.83 THOUSAND/UL (ref 4.31–10.16)

## 2024-06-13 PROCEDURE — 99285 EMERGENCY DEPT VISIT HI MDM: CPT

## 2024-06-13 PROCEDURE — 87040 BLOOD CULTURE FOR BACTERIA: CPT | Performed by: STUDENT IN AN ORGANIZED HEALTH CARE EDUCATION/TRAINING PROGRAM

## 2024-06-13 PROCEDURE — 93005 ELECTROCARDIOGRAM TRACING: CPT

## 2024-06-13 PROCEDURE — 99223 1ST HOSP IP/OBS HIGH 75: CPT | Performed by: INTERNAL MEDICINE

## 2024-06-13 PROCEDURE — 86140 C-REACTIVE PROTEIN: CPT | Performed by: STUDENT IN AN ORGANIZED HEALTH CARE EDUCATION/TRAINING PROGRAM

## 2024-06-13 PROCEDURE — 36415 COLL VENOUS BLD VENIPUNCTURE: CPT

## 2024-06-13 PROCEDURE — 94644 CONT INHLJ TX 1ST HOUR: CPT

## 2024-06-13 PROCEDURE — 84145 PROCALCITONIN (PCT): CPT

## 2024-06-13 PROCEDURE — 83036 HEMOGLOBIN GLYCOSYLATED A1C: CPT | Performed by: INTERNAL MEDICINE

## 2024-06-13 PROCEDURE — 94002 VENT MGMT INPAT INIT DAY: CPT

## 2024-06-13 PROCEDURE — 94760 N-INVAS EAR/PLS OXIMETRY 1: CPT

## 2024-06-13 PROCEDURE — 82948 REAGENT STRIP/BLOOD GLUCOSE: CPT

## 2024-06-13 PROCEDURE — 99291 CRITICAL CARE FIRST HOUR: CPT | Performed by: EMERGENCY MEDICINE

## 2024-06-13 PROCEDURE — 94640 AIRWAY INHALATION TREATMENT: CPT

## 2024-06-13 PROCEDURE — 5A09357 ASSISTANCE WITH RESPIRATORY VENTILATION, LESS THAN 24 CONSECUTIVE HOURS, CONTINUOUS POSITIVE AIRWAY PRESSURE: ICD-10-PCS | Performed by: INTERNAL MEDICINE

## 2024-06-13 PROCEDURE — 84484 ASSAY OF TROPONIN QUANT: CPT

## 2024-06-13 PROCEDURE — 83880 ASSAY OF NATRIURETIC PEPTIDE: CPT

## 2024-06-13 PROCEDURE — 93010 ELECTROCARDIOGRAM REPORT: CPT | Performed by: INTERNAL MEDICINE

## 2024-06-13 PROCEDURE — 82805 BLOOD GASES W/O2 SATURATION: CPT | Performed by: INTERNAL MEDICINE

## 2024-06-13 PROCEDURE — 94664 DEMO&/EVAL PT USE INHALER: CPT

## 2024-06-13 PROCEDURE — 71045 X-RAY EXAM CHEST 1 VIEW: CPT

## 2024-06-13 PROCEDURE — 87449 NOS EACH ORGANISM AG IA: CPT | Performed by: STUDENT IN AN ORGANIZED HEALTH CARE EDUCATION/TRAINING PROGRAM

## 2024-06-13 PROCEDURE — 0202U NFCT DS 22 TRGT SARS-COV-2: CPT | Performed by: STUDENT IN AN ORGANIZED HEALTH CARE EDUCATION/TRAINING PROGRAM

## 2024-06-13 PROCEDURE — 99239 HOSP IP/OBS DSCHRG MGMT >30: CPT | Performed by: INTERNAL MEDICINE

## 2024-06-13 PROCEDURE — 99204 OFFICE O/P NEW MOD 45 MIN: CPT | Performed by: INTERNAL MEDICINE

## 2024-06-13 PROCEDURE — HZ2ZZZZ DETOXIFICATION SERVICES FOR SUBSTANCE ABUSE TREATMENT: ICD-10-PCS | Performed by: STUDENT IN AN ORGANIZED HEALTH CARE EDUCATION/TRAINING PROGRAM

## 2024-06-13 PROCEDURE — 82805 BLOOD GASES W/O2 SATURATION: CPT

## 2024-06-13 PROCEDURE — 99223 1ST HOSP IP/OBS HIGH 75: CPT | Performed by: STUDENT IN AN ORGANIZED HEALTH CARE EDUCATION/TRAINING PROGRAM

## 2024-06-13 PROCEDURE — 99285 EMERGENCY DEPT VISIT HI MDM: CPT | Performed by: EMERGENCY MEDICINE

## 2024-06-13 PROCEDURE — 80048 BASIC METABOLIC PNL TOTAL CA: CPT

## 2024-06-13 PROCEDURE — 85025 COMPLETE CBC W/AUTO DIFF WBC: CPT

## 2024-06-13 PROCEDURE — NC001 PR NO CHARGE: Performed by: INTERNAL MEDICINE

## 2024-06-13 RX ORDER — DIAZEPAM 5 MG/1
5 TABLET ORAL EVERY 6 HOURS PRN
Status: DISCONTINUED | OUTPATIENT
Start: 2024-06-13 | End: 2024-06-13

## 2024-06-13 RX ORDER — DIAZEPAM 5 MG/1
5 TABLET ORAL EVERY 6 HOURS PRN
Status: DISCONTINUED | OUTPATIENT
Start: 2024-06-13 | End: 2024-06-13 | Stop reason: HOSPADM

## 2024-06-13 RX ORDER — DIAZEPAM 5 MG/1
5 TABLET ORAL ONCE
Status: COMPLETED | OUTPATIENT
Start: 2024-06-13 | End: 2024-06-13

## 2024-06-13 RX ORDER — ATORVASTATIN CALCIUM 40 MG/1
40 TABLET, FILM COATED ORAL
Status: DISCONTINUED | OUTPATIENT
Start: 2024-06-13 | End: 2024-06-18 | Stop reason: HOSPADM

## 2024-06-13 RX ORDER — INSULIN LISPRO 100 [IU]/ML
1-5 INJECTION, SOLUTION INTRAVENOUS; SUBCUTANEOUS EVERY 6 HOURS SCHEDULED
Status: DISCONTINUED | OUTPATIENT
Start: 2024-06-13 | End: 2024-06-13

## 2024-06-13 RX ORDER — CLOPIDOGREL BISULFATE 75 MG/1
75 TABLET ORAL DAILY
Status: DISCONTINUED | OUTPATIENT
Start: 2024-06-14 | End: 2024-06-18 | Stop reason: HOSPADM

## 2024-06-13 RX ORDER — LORAZEPAM 2 MG/ML
1 INJECTION INTRAMUSCULAR ONCE
Status: COMPLETED | OUTPATIENT
Start: 2024-06-13 | End: 2024-06-13

## 2024-06-13 RX ORDER — NICOTINE 21 MG/24HR
1 PATCH, TRANSDERMAL 24 HOURS TRANSDERMAL DAILY
Status: DISCONTINUED | OUTPATIENT
Start: 2024-06-13 | End: 2024-06-13 | Stop reason: HOSPADM

## 2024-06-13 RX ORDER — DOXAZOSIN MESYLATE 4 MG/1
8 TABLET ORAL
Status: DISCONTINUED | OUTPATIENT
Start: 2024-06-13 | End: 2024-06-18 | Stop reason: HOSPADM

## 2024-06-13 RX ORDER — FOLIC ACID 1 MG/1
1 TABLET ORAL DAILY
Status: DISCONTINUED | OUTPATIENT
Start: 2024-06-13 | End: 2024-06-13 | Stop reason: HOSPADM

## 2024-06-13 RX ORDER — ASPIRIN 81 MG/1
81 TABLET, CHEWABLE ORAL DAILY
Status: DISCONTINUED | OUTPATIENT
Start: 2024-06-14 | End: 2024-06-18 | Stop reason: HOSPADM

## 2024-06-13 RX ORDER — ACETAMINOPHEN 325 MG/1
650 TABLET ORAL EVERY 6 HOURS PRN
Status: DISCONTINUED | OUTPATIENT
Start: 2024-06-13 | End: 2024-06-13

## 2024-06-13 RX ORDER — LEVALBUTEROL INHALATION SOLUTION 1.25 MG/3ML
1.25 SOLUTION RESPIRATORY (INHALATION)
Status: DISCONTINUED | OUTPATIENT
Start: 2024-06-13 | End: 2024-06-18 | Stop reason: HOSPADM

## 2024-06-13 RX ORDER — METHYLPREDNISOLONE SODIUM SUCCINATE 40 MG/ML
40 INJECTION, POWDER, LYOPHILIZED, FOR SOLUTION INTRAMUSCULAR; INTRAVENOUS EVERY 12 HOURS SCHEDULED
Status: COMPLETED | OUTPATIENT
Start: 2024-06-13 | End: 2024-06-14

## 2024-06-13 RX ORDER — SODIUM CHLORIDE FOR INHALATION 0.9 %
12 VIAL, NEBULIZER (ML) INHALATION ONCE
Status: COMPLETED | OUTPATIENT
Start: 2024-06-13 | End: 2024-06-13

## 2024-06-13 RX ORDER — ACETAMINOPHEN 325 MG/1
975 TABLET ORAL EVERY 8 HOURS SCHEDULED
Status: DISCONTINUED | OUTPATIENT
Start: 2024-06-13 | End: 2024-06-18

## 2024-06-13 RX ORDER — ASPIRIN 81 MG/1
81 TABLET, CHEWABLE ORAL DAILY
Status: DISCONTINUED | OUTPATIENT
Start: 2024-06-13 | End: 2024-06-13 | Stop reason: HOSPADM

## 2024-06-13 RX ORDER — DIAZEPAM 5 MG/1
10 TABLET ORAL EVERY 6 HOURS PRN
Status: DISCONTINUED | OUTPATIENT
Start: 2024-06-13 | End: 2024-06-15

## 2024-06-13 RX ORDER — ACETAMINOPHEN 325 MG/1
650 TABLET ORAL EVERY 8 HOURS SCHEDULED
Status: DISCONTINUED | OUTPATIENT
Start: 2024-06-13 | End: 2024-06-13

## 2024-06-13 RX ORDER — LORAZEPAM 2 MG/ML
2 INJECTION INTRAMUSCULAR ONCE
Status: COMPLETED | OUTPATIENT
Start: 2024-06-13 | End: 2024-06-13

## 2024-06-13 RX ORDER — FUROSEMIDE 10 MG/ML
20 INJECTION INTRAMUSCULAR; INTRAVENOUS ONCE
Status: COMPLETED | OUTPATIENT
Start: 2024-06-13 | End: 2024-06-13

## 2024-06-13 RX ORDER — METHYLPREDNISOLONE SODIUM SUCCINATE 40 MG/ML
40 INJECTION, POWDER, LYOPHILIZED, FOR SOLUTION INTRAMUSCULAR; INTRAVENOUS EVERY 12 HOURS SCHEDULED
Status: DISCONTINUED | OUTPATIENT
Start: 2024-06-13 | End: 2024-06-13 | Stop reason: HOSPADM

## 2024-06-13 RX ORDER — ACETAMINOPHEN 325 MG/1
650 TABLET ORAL EVERY 8 HOURS SCHEDULED
Status: DISCONTINUED | OUTPATIENT
Start: 2024-06-13 | End: 2024-06-13 | Stop reason: HOSPADM

## 2024-06-13 RX ORDER — LEVALBUTEROL INHALATION SOLUTION 1.25 MG/3ML
1.25 SOLUTION RESPIRATORY (INHALATION)
Status: DISCONTINUED | OUTPATIENT
Start: 2024-06-13 | End: 2024-06-13 | Stop reason: HOSPADM

## 2024-06-13 RX ORDER — LIDOCAINE 50 MG/G
1 PATCH TOPICAL DAILY
Status: DISCONTINUED | OUTPATIENT
Start: 2024-06-13 | End: 2024-06-13 | Stop reason: HOSPADM

## 2024-06-13 RX ORDER — INSULIN LISPRO 100 [IU]/ML
1-5 INJECTION, SOLUTION INTRAVENOUS; SUBCUTANEOUS
Status: DISCONTINUED | OUTPATIENT
Start: 2024-06-13 | End: 2024-06-14

## 2024-06-13 RX ORDER — ATORVASTATIN CALCIUM 40 MG/1
40 TABLET, FILM COATED ORAL
Status: DISCONTINUED | OUTPATIENT
Start: 2024-06-13 | End: 2024-06-13 | Stop reason: HOSPADM

## 2024-06-13 RX ORDER — CLOPIDOGREL BISULFATE 75 MG/1
75 TABLET ORAL DAILY
Status: DISCONTINUED | OUTPATIENT
Start: 2024-06-13 | End: 2024-06-13 | Stop reason: HOSPADM

## 2024-06-13 RX ORDER — LANOLIN ALCOHOL/MO/W.PET/CERES
100 CREAM (GRAM) TOPICAL DAILY
Status: DISCONTINUED | OUTPATIENT
Start: 2024-06-13 | End: 2024-06-13 | Stop reason: HOSPADM

## 2024-06-13 RX ORDER — HEPARIN SODIUM 5000 [USP'U]/ML
5000 INJECTION, SOLUTION INTRAVENOUS; SUBCUTANEOUS EVERY 8 HOURS SCHEDULED
Status: DISCONTINUED | OUTPATIENT
Start: 2024-06-13 | End: 2024-06-13 | Stop reason: HOSPADM

## 2024-06-13 RX ORDER — FOLIC ACID 1 MG/1
1 TABLET ORAL DAILY
Status: DISCONTINUED | OUTPATIENT
Start: 2024-06-14 | End: 2024-06-18 | Stop reason: HOSPADM

## 2024-06-13 RX ORDER — HEPARIN SODIUM 5000 [USP'U]/ML
5000 INJECTION, SOLUTION INTRAVENOUS; SUBCUTANEOUS EVERY 8 HOURS SCHEDULED
Status: DISCONTINUED | OUTPATIENT
Start: 2024-06-13 | End: 2024-06-18 | Stop reason: HOSPADM

## 2024-06-13 RX ORDER — KETOROLAC TROMETHAMINE 30 MG/ML
15 INJECTION, SOLUTION INTRAMUSCULAR; INTRAVENOUS ONCE
Status: COMPLETED | OUTPATIENT
Start: 2024-06-13 | End: 2024-06-13

## 2024-06-13 RX ORDER — NICOTINE 21 MG/24HR
1 PATCH, TRANSDERMAL 24 HOURS TRANSDERMAL DAILY
Status: DISCONTINUED | OUTPATIENT
Start: 2024-06-14 | End: 2024-06-18 | Stop reason: HOSPADM

## 2024-06-13 RX ORDER — INSULIN LISPRO 100 [IU]/ML
1-5 INJECTION, SOLUTION INTRAVENOUS; SUBCUTANEOUS EVERY 6 HOURS SCHEDULED
Status: DISCONTINUED | OUTPATIENT
Start: 2024-06-13 | End: 2024-06-13 | Stop reason: HOSPADM

## 2024-06-13 RX ORDER — OXYCODONE HYDROCHLORIDE 5 MG/1
5 TABLET ORAL EVERY 6 HOURS PRN
Status: DISCONTINUED | OUTPATIENT
Start: 2024-06-13 | End: 2024-06-15

## 2024-06-13 RX ORDER — ALBUTEROL SULFATE 90 UG/1
2 AEROSOL, METERED RESPIRATORY (INHALATION) EVERY 4 HOURS PRN
Status: DISCONTINUED | OUTPATIENT
Start: 2024-06-13 | End: 2024-06-13

## 2024-06-13 RX ORDER — ACETAMINOPHEN 325 MG/1
650 TABLET ORAL EVERY 6 HOURS PRN
Status: DISCONTINUED | OUTPATIENT
Start: 2024-06-13 | End: 2024-06-13 | Stop reason: HOSPADM

## 2024-06-13 RX ORDER — LIDOCAINE 50 MG/G
1 PATCH TOPICAL DAILY
Status: DISCONTINUED | OUTPATIENT
Start: 2024-06-14 | End: 2024-06-18 | Stop reason: HOSPADM

## 2024-06-13 RX ORDER — LANOLIN ALCOHOL/MO/W.PET/CERES
100 CREAM (GRAM) TOPICAL DAILY
Status: DISCONTINUED | OUTPATIENT
Start: 2024-06-14 | End: 2024-06-18 | Stop reason: HOSPADM

## 2024-06-13 RX ORDER — DIAZEPAM 5 MG/ML
10 INJECTION, SOLUTION INTRAMUSCULAR; INTRAVENOUS ONCE
Status: DISCONTINUED | OUTPATIENT
Start: 2024-06-13 | End: 2024-06-13

## 2024-06-13 RX ORDER — DOXAZOSIN MESYLATE 4 MG/1
8 TABLET ORAL
Status: DISCONTINUED | OUTPATIENT
Start: 2024-06-13 | End: 2024-06-13

## 2024-06-13 RX ORDER — LISINOPRIL 20 MG/1
40 TABLET ORAL DAILY
Status: DISCONTINUED | OUTPATIENT
Start: 2024-06-13 | End: 2024-06-18 | Stop reason: HOSPADM

## 2024-06-13 RX ADMIN — OXYCODONE HYDROCHLORIDE 5 MG: 5 TABLET ORAL at 21:04

## 2024-06-13 RX ADMIN — HEPARIN SODIUM 5000 UNITS: 5000 INJECTION INTRAVENOUS; SUBCUTANEOUS at 14:41

## 2024-06-13 RX ADMIN — INSULIN LISPRO 3 UNITS: 100 INJECTION, SOLUTION INTRAVENOUS; SUBCUTANEOUS at 21:09

## 2024-06-13 RX ADMIN — IPRATROPIUM BROMIDE 0.5 MG: 0.5 SOLUTION RESPIRATORY (INHALATION) at 20:23

## 2024-06-13 RX ADMIN — IPRATROPIUM BROMIDE 0.5 MG: 0.5 SOLUTION RESPIRATORY (INHALATION) at 09:25

## 2024-06-13 RX ADMIN — ACETAMINOPHEN 975 MG: 325 TABLET, FILM COATED ORAL at 14:42

## 2024-06-13 RX ADMIN — KETOROLAC TROMETHAMINE 15 MG: 30 INJECTION, SOLUTION INTRAMUSCULAR; INTRAVENOUS at 14:42

## 2024-06-13 RX ADMIN — DOXYCYCLINE 100 MG: 100 INJECTION, POWDER, LYOPHILIZED, FOR SOLUTION INTRAVENOUS at 14:47

## 2024-06-13 RX ADMIN — ISODIUM CHLORIDE 12 ML: 0.03 SOLUTION RESPIRATORY (INHALATION) at 14:36

## 2024-06-13 RX ADMIN — IPRATROPIUM BROMIDE 1 MG: 0.5 SOLUTION RESPIRATORY (INHALATION) at 14:36

## 2024-06-13 RX ADMIN — METHYLPREDNISOLONE SODIUM SUCCINATE 40 MG: 40 INJECTION, POWDER, FOR SOLUTION INTRAMUSCULAR; INTRAVENOUS at 08:34

## 2024-06-13 RX ADMIN — METHYLPREDNISOLONE SODIUM SUCCINATE 40 MG: 40 INJECTION, POWDER, FOR SOLUTION INTRAMUSCULAR; INTRAVENOUS at 21:02

## 2024-06-13 RX ADMIN — INSULIN LISPRO 3 UNITS: 100 INJECTION, SOLUTION INTRAVENOUS; SUBCUTANEOUS at 17:12

## 2024-06-13 RX ADMIN — LEVALBUTEROL HYDROCHLORIDE 1.25 MG: 1.25 SOLUTION RESPIRATORY (INHALATION) at 20:23

## 2024-06-13 RX ADMIN — HEPARIN SODIUM 5000 UNITS: 5000 INJECTION INTRAVENOUS; SUBCUTANEOUS at 21:04

## 2024-06-13 RX ADMIN — ALBUTEROL SULFATE 10 MG: 2.5 SOLUTION RESPIRATORY (INHALATION) at 04:50

## 2024-06-13 RX ADMIN — LIDOCAINE 1 PATCH: 50 PATCH CUTANEOUS at 08:35

## 2024-06-13 RX ADMIN — FUROSEMIDE 20 MG: 10 INJECTION, SOLUTION INTRAMUSCULAR; INTRAVENOUS at 17:05

## 2024-06-13 RX ADMIN — Medication 2.5 MG: at 17:05

## 2024-06-13 RX ADMIN — LORAZEPAM 2 MG: 2 INJECTION INTRAMUSCULAR; INTRAVENOUS at 11:15

## 2024-06-13 RX ADMIN — ALBUTEROL SULFATE 10 MG: 2.5 SOLUTION RESPIRATORY (INHALATION) at 14:36

## 2024-06-13 RX ADMIN — DIAZEPAM 5 MG: 5 TABLET ORAL at 17:12

## 2024-06-13 RX ADMIN — ISODIUM CHLORIDE 12 ML: 0.03 SOLUTION RESPIRATORY (INHALATION) at 04:51

## 2024-06-13 RX ADMIN — HEPARIN SODIUM 5000 UNITS: 5000 INJECTION INTRAVENOUS; SUBCUTANEOUS at 08:37

## 2024-06-13 RX ADMIN — LISINOPRIL 40 MG: 20 TABLET ORAL at 17:05

## 2024-06-13 RX ADMIN — LEVALBUTEROL HYDROCHLORIDE 1.25 MG: 1.25 SOLUTION RESPIRATORY (INHALATION) at 09:25

## 2024-06-13 RX ADMIN — IPRATROPIUM BROMIDE 1 MG: 0.5 SOLUTION RESPIRATORY (INHALATION) at 04:50

## 2024-06-13 RX ADMIN — INSULIN LISPRO 1 UNITS: 100 INJECTION, SOLUTION INTRAVENOUS; SUBCUTANEOUS at 08:35

## 2024-06-13 RX ADMIN — LORAZEPAM 1 MG: 2 INJECTION INTRAMUSCULAR; INTRAVENOUS at 14:41

## 2024-06-13 RX ADMIN — ACETAMINOPHEN 975 MG: 325 TABLET, FILM COATED ORAL at 21:03

## 2024-06-13 RX ADMIN — DIAZEPAM 5 MG: 5 TABLET ORAL at 17:45

## 2024-06-13 RX ADMIN — DOXAZOSIN 8 MG: 4 TABLET ORAL at 21:03

## 2024-06-13 RX ADMIN — DIAZEPAM 10 MG: 5 TABLET ORAL at 21:03

## 2024-06-13 RX ADMIN — ATORVASTATIN CALCIUM 40 MG: 40 TABLET, FILM COATED ORAL at 17:04

## 2024-06-13 NOTE — ASSESSMENT & PLAN NOTE
Rib series yesterday with subtle nondisplaced R sixth and seventh rib fractures  Lidocaine patch   Incentive spirometry   Pulm consult

## 2024-06-13 NOTE — DISCHARGE SUMMARY
"[facility]  Discharge- [facility] 1965, 58 y.o. male MRN: [id number]  Unit/Bed#: FLO Encounter: [id number]  Primary Care Provider: No primary care provider on file.   Date and time admitted to hospital: 2024  4:37 AM    * Acute respiratory failure with hypoxia and hypercapnia (HCC)  Assessment & Plan  Presented to the ER early this AM from [facility] with increased somnolence   Hypersomnolent on exam  VB.28/72/43/34  Started on Bipap in the ER and repeat VBG an hour later with VB.32/61/57/31  Evaluated by CC in ER and ok for SLIM  Recently hospitalized yesterday for Encephalopathy thought 2/2 Polypharmacy; Patient underwent CTA H&N yesterday and was evaluated by Neurology and mentation improved and thus MRI was deferred and patient's symptoms attributed to metabolic encephalopathy   pAppears patient was recently started on keppra 750mg bid for 10 days, Valium prn, clonidine and robaxin; UDS yesterday was positive for benzos yesterday   Reported history of alcohol abuse, crack abuse                 Medical Problems       Resolved Problems  Date Reviewed: 2024   None         Admission Date:   Admission Orders (From admission, onward)       Ordered        24 0743  INPATIENT ADMISSION  Once                            Admitting Diagnosis: Altered mental status [R41.82]    HPI: Patient leaving AGAINST MEDICAL ADVICE.  He was readmitted earlier today.  Patient has known history of substance use and came in from rehab to the hospital for hypercapnic respiratory failure.  Patient was placed on BiPAP with subsequent improvement of symptoms.  He appears to be baseline in his cognitive status.  He is interactive.  He is appears quite frustrated and upset about staying in the hospital.  He reports that he \"wants to get back to rehab\".    Procedures Performed:   Orders Placed This Encounter   Procedures    Critical Care       Summary of Hospital Course: " He is leaving AGAINST MEDICAL ADVICE.  Patient eloped before I could talk with him again.        Condition at Discharge: fair         Discharge instructions/Information to patient and family:   See after visit summary for information provided to patient and family.      Provisions for Follow-Up Care:  See after visit summary for information related to follow-up care and any pertinent home health orders.      PCP: No primary care provider on file.    Disposition: Home    Planned Readmission: No    Discharge Statement   I spent 85 minutes discharging the patient. This time was spent on the day of discharge. I had direct contact with the patient on the day of discharge. Additional documentation is required if more than 30 minutes were spent on discharge.     Discharge Medications:  See after visit summary for reconciled discharge medications provided to patient and family.

## 2024-06-13 NOTE — H&P
[facility]  H&P  Name: Kyler Almaguer 58 y.o. male I MRN: [id number]  Unit/Bed#: QCC I Date of Admission: 6/13/2024   Date of Service: 6/13/2024 I Hospital Day: 0      Assessment & Plan   * Acute respiratory failure with hypoxia and hypercapnia (HCC)  Assessment & Plan  Initially presented to ER earlier this morning from [place] run with increased somnolence and was noted to be in COPD exacerbation.  Patient was started on BiPAP initially and thereafter patient was taken off BiPAP with subsequent improvement in symptoms.  Patient was frustrated and upset about staying in the hospital and therefore left AMA only to return 30 minutes later after he spoke with his wife who told him to come back to the hospital for admission.  Currently saturating well on room air  Does have decreased breath sounds and wheezing on exam concerning for COPD exacerbation  Resume IV Solu-Medrol 40 mg twice daily  Resume IV doxycycline 100 mg twice daily  Pulmonology consulted  Follow-up blood cultures, sputum cultures and urine strep antigen  Pro-Kannan negative this morning, CRP elevated  Airway clearance  BiPAP as needed  CIWA protocol    Rib fracture  Assessment & Plan  Noted to have nondisplaced right sixth and seventh rib fracture on rib series  Patient reportedly fell and hit the corner of a table a few days ago  Incentive spirometry  Pain control with Tylenol, lidocaine patch and as needed oxycodone    Alcohol abuse  Assessment & Plan  Last drink on 6/7  Continue CIWA protocol  Valium as needed    History of crack cocaine use  Assessment & Plan  Reported by family  Monitor     History of stroke  Assessment & Plan  Continue PTA aspirin, statin and Plavix  Previously admitted with encephalopathy likely TME related to hypercapnia  Mentation currently at baseline and improving    Type 2 diabetes mellitus without complication, without long-term current use of insulin (HCC)  Assessment & Plan  Lab  Results   Component Value Date    HGBA1C 7.0 (H) 06/13/2024       Recent Labs     06/11/24  1323 06/11/24  1642 06/12/24  1041 06/13/24  0834   POCGLU 134 193* 175* 168*       Blood Sugar Average: Last 72 hrs:    SSI  Stable    Primary hypertension  Assessment & Plan  Blood pressure elevated in the 190s  Resume home doxazosin, lisinopril           VTE Pharmacologic Prophylaxis:   Moderate Risk (Score 3-4) - Pharmacological DVT Prophylaxis Ordered: heparin.  Code Status: Level 3 - DNAR and DNI   Discussion with family:  Patient.     Anticipated Length of Stay: Patient will be admitted on an inpatient basis with an anticipated length of stay of greater than 2 midnights secondary to COPD exacerbation requiring IV steroids and IV antibiotics.    Total Time Spent on Date of Encounter in care of patient: 60 mins. This time was spent on one or more of the following: performing physical exam; counseling and coordination of care; obtaining or reviewing history; documenting in the medical record; reviewing/ordering tests, medications or procedures; communicating with other healthcare professionals and discussing with patient's family/caregivers.    Chief Complaint: Shortness of breath    History of Present Illness:  Kylre Almaguer is a 58 y.o. male with a PMH of COPD, chronic cocaine use, alcohol abuse, history of stroke, type 2 diabetes mellitus, recent rib fracture after fall who presents with shortness of breath.  Patient presented earlier this morning in the ED and admitted for COPD exacerbation.  Patient was discharged as AMA as patient reported that he was frustrated and wanted to leave the hospital.  On initial arrival to the ED earlier this morning, patient was briefly on BiPAP.  Patient symptoms improved and used transition off BiPAP.  He felt like his breathing was improving and therefore left however once discharge he returned within 30 minutes due to feeling worse again and his wife telling him to go back to the  hospital.  Vital signs significant for tachypnea, tachycardia and complaining of right chest pain from known rib fracture.  Also complaining of anxiety and trying from alcohol.  Last alcohol use on 6/7.  Of note, patient was previously undergoing rehab at [facility] for alcohol abuse.  Patient is currently willing to stay in the hospital for further treatment.  Patient received breathing treatments in the ED and Valium for withdrawal symptoms.  Patient will be admitted for COPD exacerbation for IV steroids and IV antibiotic use.    Review of Systems:  Review of Systems   Constitutional:  Negative for chills and fever.   HENT:  Negative for congestion and sore throat.    Eyes:  Negative for pain and visual disturbance.   Respiratory:  Positive for shortness of breath. Negative for cough.    Cardiovascular:  Positive for chest pain (right chest pain from known rib fracture).   Gastrointestinal:  Negative for abdominal pain, constipation, diarrhea, nausea and vomiting.   Genitourinary:  Negative for difficulty urinating and dysuria.   Musculoskeletal:  Negative for back pain and gait problem.   Skin:  Negative for rash and wound.   Neurological:  Negative for light-headedness and headaches.   Psychiatric/Behavioral:  Negative for behavioral problems and confusion.        Past Medical and Surgical History:   Past Medical History:   Diagnosis Date    COPD (chronic obstructive pulmonary disease) (HCC)     Diabetes mellitus (HCC)     Hypertension     Stroke (HCC)     TIA (transient ischemic attack)        History reviewed. No pertinent surgical history.    Meds/Allergies:  Prior to Admission medications    Medication Sig Start Date End Date Taking? Authorizing Provider   acetaminophen (TYLENOL) 325 mg tablet Take 3 tablets (975 mg total) by mouth every 8 (eight) hours 6/12/24   Hetul Doran, DO   albuterol (PROVENTIL HFA,VENTOLIN HFA) 90 mcg/act inhaler Inhale 2 puffs every 6 (six) hours as needed for wheezing     Historical Provider, MD   aspirin 81 mg chewable tablet Chew 81 mg daily    Historical Provider, MD   atorvastatin (LIPITOR) 40 mg tablet Take 40 mg by mouth daily 1/23/24   Historical Provider, MD   cloNIDine (CATAPRES) 0.1 mg tablet Take 0.1 mg by mouth every 4 (four) hours as needed for high blood pressure    Historical Provider, MD   clopidogrel (PLAVIX) 75 mg tablet Take 75 mg by mouth daily 1/23/24   Historical Provider, MD   diazepam (VALIUM) 10 mg tablet Take 10 mg by mouth every 6 (six) hours as needed for anxiety    Historical Provider, MD   doxazosin (CARDURA) 8 MG tablet Take 8 mg by mouth daily at bedtime    Historical Provider, MD   dutasteride (AVODART) 0.5 mg capsule Take 0.5 mg by mouth daily    Historical Provider, MD   gabapentin (NEURONTIN) 100 mg capsule Take 1 capsule (100 mg total) by mouth 3 (three) times a day 6/12/24   Huma Doran DO   ibuprofen (MOTRIN) 600 mg tablet Take by mouth every 6 (six) hours as needed for mild pain    Historical Provider, MD   lisinopril (ZESTRIL) 20 mg tablet Take 40 mg by mouth daily 1/23/24   Historical Provider, MD   lisinopril (ZESTRIL) 20 mg tablet Take 1 tablet (20 mg total) by mouth every evening 6/12/24   Huma Doran DO   metFORMIN (GLUCOPHAGE) 1000 MG tablet Take 1,000 mg by mouth 2 (two) times a day 1/23/24   Historical Provider, MD   mirtazapine (REMERON SOL-TAB) 15 mg disintegrating tablet Take 15 mg by mouth daily at bedtime    Historical Provider, MD   QUEtiapine (SEROquel) 50 mg tablet Take 50 mg by mouth daily at bedtime    Historical Provider, MD CAMPBELL have reviewed home medications using recent Epic encounter.    Allergies:   Allergies   Allergen Reactions    Penicillin V Hives       Social History:  Marital Status: /Civil Union   Substance Use History:   Social History     Substance and Sexual Activity   Alcohol Use Not Currently    Comment: at [employer]     Social History     Tobacco Use   Smoking Status Every Day    Types:  Cigarettes   Smokeless Tobacco Never   Tobacco Comments    8 cigarettes a day     Social History     Substance and Sexual Activity   Drug Use Yes    Types: Hydrocodone       Family History:  History reviewed. No pertinent family history.    Physical Exam:     Vitals:   Blood Pressure: 147/99 (06/13/24 1450)  Pulse: 96 (06/13/24 1448)  Temperature: 98.2 °F (36.8 °C) (06/13/24 1340)  Temp Source: Temporal (06/13/24 1340)  Respirations: 20 (06/13/24 1340)  Weight - Scale: 127 kg (279 lb) (06/13/24 1340)  SpO2: 93 % (06/13/24 1340)    Physical Exam  Vitals reviewed.   Constitutional:       General: He is not in acute distress.     Appearance: Normal appearance. He is not ill-appearing.   HENT:      Head: Normocephalic and atraumatic.   Cardiovascular:      Rate and Rhythm: Regular rhythm. Tachycardia present.      Heart sounds: Normal heart sounds.   Pulmonary:      Breath sounds: Wheezing present.      Comments: Decreased breath sounds bilaterally  Abdominal:      General: Bowel sounds are normal.      Palpations: Abdomen is soft.      Tenderness: There is no abdominal tenderness.   Musculoskeletal:      Right lower leg: No edema.      Left lower leg: No edema.   Skin:     General: Skin is warm and dry.   Neurological:      General: No focal deficit present.      Mental Status: He is alert. Mental status is at baseline.          Additional Data:     Lab Results:  Results from last 7 days   Lab Units 06/13/24  0449   WBC Thousand/uL 4.83   HEMOGLOBIN g/dL 14.6   HEMATOCRIT % 44.5   PLATELETS Thousands/uL 236   SEGS PCT % 51   LYMPHO PCT % 35   MONO PCT % 10   EOS PCT % 3     Results from last 7 days   Lab Units 06/13/24  0449 06/11/24  0921   SODIUM mmol/L 140 141   POTASSIUM mmol/L 3.8 4.2   CHLORIDE mmol/L 101 101   CO2 mmol/L 32 33*   BUN mg/dL 13 12   CREATININE mg/dL 0.78 0.73   ANION GAP mmol/L 7 7   CALCIUM mg/dL 9.1 9.5   ALBUMIN g/dL  --  3.9   TOTAL BILIRUBIN mg/dL  --  0.25   ALK PHOS U/L  --  84   ALT U/L   --  79*   AST U/L  --  37   GLUCOSE RANDOM mg/dL 189* 211*         Results from last 7 days   Lab Units 06/13/24  0834 06/12/24  1041 06/11/24  1642 06/11/24  1323 06/11/24  0910   POC GLUCOSE mg/dl 168* 175* 193* 134 213*     Lab Results   Component Value Date    HGBA1C 7.0 (H) 06/13/2024    HGBA1C 7.2 (H) 06/11/2024    HGBA1C 7.2 (H) 05/14/2023     Results from last 7 days   Lab Units 06/13/24  0449   PROCALCITONIN ng/ml 0.11       Lines/Drains:  Invasive Devices       Peripheral Intravenous Line  Duration             Peripheral IV 06/13/24 Right;Ventral (anterior) Hand <1 day                        Imaging: Reviewed radiology reports from this admission including: chest xray  No orders to display       EKG and Other Studies Reviewed on Admission:   EKG: Personally Reviewed. Sinus Tachycardia. HR 96.    ** Please Note: This note has been constructed using a voice recognition system. **

## 2024-06-13 NOTE — ASSESSMENT & PLAN NOTE
Initially presented to ER earlier this morning from [place] run with increased somnolence and was noted to be in COPD exacerbation.  Patient was started on BiPAP initially and thereafter patient was taken off BiPAP with subsequent improvement in symptoms.  Patient was frustrated and upset about staying in the hospital and therefore left AMA only to return 30 minutes later after he spoke with his wife who told him to come back to the hospital for admission.  Currently saturating well on room air  Does have decreased breath sounds and wheezing on exam concerning for COPD exacerbation  Resume IV Solu-Medrol 40 mg twice daily  Resume IV doxycycline 100 mg twice daily  Pulmonology consulted  Follow-up blood cultures, sputum cultures and urine strep antigen  Pro-Kannan negative this morning, CRP elevated  Airway clearance  BiPAP as needed  CIWA protocol

## 2024-06-13 NOTE — ED ATTENDING ATTESTATION
6/13/2024  I, Idris Baptiste DO, saw and evaluated the patient. I have discussed the patient with the resident/non-physician practitioner and agree with the resident's/non-physician practitioner's findings, Plan of Care, and MDM as documented in the resident's/non-physician practitioner's note, except where noted. All available labs and Radiology studies were reviewed.  I was present for key portions of any procedure(s) performed by the resident/non-physician practitioner and I was immediately available to provide assistance.       At this point I agree with the current assessment done in the Emergency Department.  I have conducted an independent evaluation of this patient a history and physical is as follows:    Patient is brought in by EMS from [facility] for recovery alcohol and drug rehab.  Patient has a history of COPD, hypertension, hyperlipidemia, diabetes, alcohol and cocaine usage, brought in out of concerns for altered mental status and chest pain. patient says that he has had chest pain since yesterday.  It is mild in severity, worse with nothing and better with nothing, no falls or trauma.    Patient was hospitalized June 11 through June 12, presented initially for acute dysarthria, somnolence right-sided weakness and chest pain.  Acute stroke report was unremarkable, CT CTA head and neck showed no acute pathology.  Neurology felt that it was toxic metabolic encephalopathy due to new medication started the alcohol rehab on June 7 including Keppra, Valium, clonidine, ibuprofen, Robaxin.    Patient was discharged back to the facility yesterday afternoon.    EMS indicates they were told by the staff at his facility that tonight he just became more tired and less alert.    General:  Patient has some increased work of breathing  Head:  Atraumatic  Eyes:  Conjunctiva pink, PERRL, EOMI  ENT:  Mucous membranes are moist  Neck:  Supple  Cardiac:  S1-S2, without murmurs  Lungs: Bilateral rhonchi, no  clinical respiratory failure but does have increased work of breathing.  No accessory muscle usage  Abdomen:  Soft, nontender, normal bowel sounds, no CVA tenderness, no tympany, no rigidity, no guarding  Extremities:  Normal range of motion, no pedal edema or calf asymmetry, radial pulses are equal and symmetric bilaterally  Neurologic:  Awake, fluent speech, oriented to person, place, time and recent events.  Strength is 4 out of 5 in the bilateral arms and legs, normal sensation at the entire body, cranial nerves II through XII are intact.  He does not seem to understand the commands to perform finger-nose or pronator drift testing.  Skin:  Pink warm and dry    ED Course  ED Course as of 06/13/24 0510   u Jun 13, 2024   0508 ECG interpreted me, sinus rhythm, rate of 81, no acute ST segment elevation or depression, no acute ischemic or infarctive changes, no acute change from June 11, 2024     Labs Reviewed   BASIC METABOLIC PANEL - Abnormal       Result Value Ref Range Status    Sodium 140  135 - 147 mmol/L Final    Potassium 3.8  3.5 - 5.3 mmol/L Final    Chloride 101  96 - 108 mmol/L Final    CO2 32  21 - 32 mmol/L Final    ANION GAP 7  4 - 13 mmol/L Final    BUN 13  5 - 25 mg/dL Final    Creatinine 0.78  0.60 - 1.30 mg/dL Final    Comment: Standardized to IDMS reference method    Glucose 189 (*) 65 - 140 mg/dL Final    Comment: If the patient is fasting, the ADA then defines impaired fasting glucose as > 100 mg/dL and diabetes as > or equal to 123 mg/dL.    Calcium 9.1  8.4 - 10.2 mg/dL Final    eGFR 98  ml/min/1.73sq m Final    Narrative:     National Kidney Disease Foundation guidelines for Chronic Kidney Disease (CKD):     Stage 1 with normal or high GFR (GFR > 90 mL/min/1.73 square meters)    Stage 2 Mild CKD (GFR = 60-89 mL/min/1.73 square meters)    Stage 3A Moderate CKD (GFR = 45-59 mL/min/1.73 square meters)    Stage 3B Moderate CKD (GFR = 30-44 mL/min/1.73 square meters)    Stage 4 Severe CKD (GFR =  "15-29 mL/min/1.73 square meters)    Stage 5 End Stage CKD (GFR <15 mL/min/1.73 square meters)  Note: GFR calculation is accurate only with a steady state creatinine   BLOOD GAS, VENOUS - Abnormal    pH, Venkatesh 7.289 (*) 7.300 - 7.400 Final    pCO2, Venkatesh 72.4 (*) 42.0 - 50.0 mm Hg Final    pO2, Venkatesh 43.6  35.0 - 45.0 mm Hg Final    HCO3, Venkatesh 34.0 (*) 24 - 30 mmol/L Final    Base Excess, Venkatesh 4.4  mmol/L Final    O2 Content, Venkatesh 16.7  ml/dL Final    O2 HGB, VENOUS 73.8  60.0 - 80.0 % Final   HS TROPONIN I 0HR - Normal    hs TnI 0hr 17  \"Refer to ACS Flowchart\"- see link ng/L Final    Comment:                                              Initial (time 0) result  If >=50 ng/L, Myocardial injury suggested ;  Type of myocardial injury and treatment strategy  to be determined.  If 5-49 ng/L, a delta result at 2 hours and or 4 hours will be needed to further evaluate.  If <4 ng/L, and chest pain has been >3 hours since onset, patient may qualify for discharge based on the HEART score in the ED.  If <5 ng/L and <3hours since onset of chest pain, a delta result at 2 hours will be needed to further evaluate.    HS Troponin 99th Percentile URL of a Health Population=12 ng/L with a 95% Confidence Interval of 8-18 ng/L.    Second Troponin (time 2 hours)  If calculated delta >= 20 ng/L,  Myocardial injury suggested ; Type of myocardial injury and treatment strategy to be determined.  If 5-49 ng/L and the calculated delta is 5-19 ng/L, consult medical service for evaluation.  Continue evaluation for ischemia on ecg and other possible etiology and repeat hs troponin at 4 hours.  If delta is <5 ng/L at 2 hours, consider discharge based on risk stratification via the HEART score (if in ED), or VERNON risk score in IP/Observation.    HS Troponin 99th Percentile URL of a Health Population=12 ng/L with a 95% Confidence Interval of 8-18 ng/L.   B-TYPE NATRIURETIC PEPTIDE (BNP) - Normal    BNP 20  0 - 100 pg/mL Final   PROCALCITONIN TEST - Normal "    Procalcitonin 0.11  <=0.25 ng/ml Final    Comment: Suspected Lower Respiratory Tract Infection (LRTI):  - LESS than or EQUAL to 0.25 ng/mL:   low likelihood for bacterial LRTI; antibiotics DISCOURAGED.  - GREATER than 0.25 ng/mL:   increased likelihood for bacterial LRTI; antibiotics ENCOURAGED.    Suspected Sepsis:  - Strongly consider initiating antibiotics in ALL UNSTABLE patients.  - LESS than or EQUAL to 0.5 ng/mL:   low likelihood for bacterial sepsis; antibiotics DISCOURAGED.  - GREATER than 0.5 ng/mL:   increased likelihood for bacterial sepsis; antibiotics ENCOURAGED.  - GREATER than 2 ng/mL:   high risk for severe sepsis / septic shock; antibiotics strongly ENCOURAGED.    Decisions on antibiotic use should not be based solely on Procalcitonin (PCT) levels. If PCT is low but uncertainty exists with stopping antibiotics, repeat PCT in 6-24 hours to confirm the low level. If antibiotics are administered (regardless if initial PCT was high or low), repeat PCT every 1-2 days to consider early antibiotic cessation (when GREATER than 80% decrease from the peak OR when PCT drops below designated cutoffs, whichever comes first), so long as the infection is NOT one that typically requires prolonged treatment durations (e.g., bone/joint infections, endocarditis, Staph. aureus bacteremia).    Situations of FALSE-POSITIVE Procalcitonin values:  1) Newborns < 72 hours old  2) Massive stress from severe trauma / burns, major surgery, acute pancreatitis, cardiogenic / hemorrhagic shock, sickle cell crisis, or other organ perfusion abnormalities  3) Malaria and some Candidal infections  4) Treatment with agents that stimulate cytokines (e.g., OKT3, anti-lymphocyte globulins, alemtuzumab, IL-2, granulocyte transfusion [NOT GCSFs])  5) Chronic renal disease causes elevated baseline levels (consider GREATER than 0.75 ng/mL as an abnormal cut-off); initiating HD/CRRT may cause transient decreases  6) Paraneoplastic  syndromes from medullary thyroid or SCLC, some forms of vasculitis, and acute exdtv-zo-hept disease    Situations of FALSE-NEGATIVE Procalcitonin values:  1) Too early in clinical course for PCT to have reached its peak (may repeat in 6-24 hours to confirm low level)  2) Localized infection WITHOUT systemic (SIRS / sepsis) response (e.g., an abscess, osteomyelitis, cystitis)  3) Mycobacteria (e.g., Tuberculosis, MAC)  4) Cystic fibrosis exacerbations     CBC AND DIFFERENTIAL    WBC 4.83  4.31 - 10.16 Thousand/uL Final    RBC 4.82  3.88 - 5.62 Million/uL Final    Hemoglobin 14.6  12.0 - 17.0 g/dL Final    Hematocrit 44.5  36.5 - 49.3 % Final    MCV 92  82 - 98 fL Final    MCH 30.3  26.8 - 34.3 pg Final    MCHC 32.8  31.4 - 37.4 g/dL Final    RDW 12.4  11.6 - 15.1 % Final    MPV 10.6  8.9 - 12.7 fL Final    Platelets 236  149 - 390 Thousands/uL Final    nRBC 0  /100 WBCs Final    Segmented % 51  43 - 75 % Final    Immature Grans % 0  0 - 2 % Final    Lymphocytes % 35  14 - 44 % Final    Monocytes % 10  4 - 12 % Final    Eosinophils Relative 3  0 - 6 % Final    Basophils Relative 1  0 - 1 % Final    Absolute Neutrophils 2.45  1.85 - 7.62 Thousands/µL Final    Absolute Immature Grans 0.02  0.00 - 0.20 Thousand/uL Final    Absolute Lymphocytes 1.70  0.60 - 4.47 Thousands/µL Final    Absolute Monocytes 0.49  0.17 - 1.22 Thousand/µL Final    Eosinophils Absolute 0.14  0.00 - 0.61 Thousand/µL Final    Basophils Absolute 0.03  0.00 - 0.10 Thousands/µL Final   HS TROPONIN I 2HR   BLOOD GAS, VENOUS   HS TROPONIN I 4HR     Patient initially hypoxic, placed on 2 L nasal cannula, reassessed, had still increased work of breathing, no sign of need for intubation but placed on noninvasive ventilation, reassessed several times, feeling better.    Suspect the patient's change in mental status and decreased alertness may be secondary to increased CO2 retention.  His chest discomfort cause is indeterminant, initial troponin  indeterminant, plan is for 2-hour delta troponin, for ACS evaluation and admission.I do not believe this patient's complaints are from pulmonary embolism and I believe they would most likely be harmed through false positive test results and other complications of testing by further pursuing the diagnosis of pulmonary embolism.    Case discussed with admitting medicine physician Dr. Gonzales    The patient was evaluated for sepsis in the emergency department. After that assessment, at the time of admission, no sepsis, severe sepsis, or septic shock was found.    DIAGNOSIS:  Acute chest pain, acute hypercarbia    MEDICAL DECISION MAKING CODING    COLLECTION AND INTERPRETATION OF DATA  I reviewed prior external notes, including June 12, 2024 hospital discharge summary    I ordered each unique test  Tests reviewed personally by me:  ECG: See my ED course  Labs: See above  Imaging: I independently interpreted the chest x-ray and found no pneumonia or pneumothorax.            Critical Care Time  CriticalCare Time    Date/Time: 6/13/2024 6:59 AM    Performed by: Idris Baptiste DO  Authorized by: Idris Baptiste DO    Critical care provider statement:     Critical care time (minutes):  33    Critical care time was exclusive of:  Separately billable procedures and treating other patients and teaching time    Critical care was necessary to treat or prevent imminent or life-threatening deterioration of the following conditions:  Respiratory failure    Critical care was time spent personally by me on the following activities:  Blood draw for specimens, obtaining history from patient or surrogate, development of treatment plan with patient or surrogate, discussions with primary provider, evaluation of patient's response to treatment, examination of patient, review of old charts, re-evaluation of patient's condition, ordering and review of radiographic studies, ordering and review of laboratory studies, ordering and performing  treatments and interventions and ventilator management    I assumed direction of critical care for this patient from another provider in my specialty: no

## 2024-06-13 NOTE — ASSESSMENT & PLAN NOTE
Lab Results   Component Value Date    HGBA1C 7.0 (H) 06/13/2024       Recent Labs     06/11/24  0910 06/11/24  1323 06/11/24  1642 06/12/24  1041   POCGLU 213* 134 193* 175*       Blood Sugar Average: Last 72 hrs:    SSI while hospitalized every 6 hours

## 2024-06-13 NOTE — ED PROVIDER NOTES
History  Chief Complaint   Patient presents with    Altered Mental Status     Pt sent via EMS from nursing facility w/ AMS. Pt lethargic during triage stating he is confused and having CP. Pt recently admitted to hospital w/ COPD exac.        HPI  Patient is a 58 y.o. male with history of COPD presenting to the emergency department for altered mental status and difficulty breathing. Per EMS they were dispatched to nursing / rehab facility because the patient developed altered mental status and had difficulty breathing. Patient was hypoxic to 92 for EMS so they placed him on 2L NC. On arrival to the ED patient slow to answer questions, appropriately oriented but not answering all questions. Complaining of shortness of breathing chest pain, also states that his ankles are currently swollen, has no other complaints at this time.     Prior to Admission Medications   Prescriptions Last Dose Informant Patient Reported? Taking?   QUEtiapine (SEROquel) 50 mg tablet   Yes No   Sig: Take 50 mg by mouth daily at bedtime   acetaminophen (TYLENOL) 325 mg tablet   No No   Sig: Take 3 tablets (975 mg total) by mouth every 8 (eight) hours   albuterol (PROVENTIL HFA,VENTOLIN HFA) 90 mcg/act inhaler   Yes No   Sig: Inhale 2 puffs every 6 (six) hours as needed for wheezing   aspirin 81 mg chewable tablet   Yes No   Sig: Chew 81 mg daily   atorvastatin (LIPITOR) 40 mg tablet   Yes No   Sig: Take 40 mg by mouth daily   cloNIDine (CATAPRES) 0.1 mg tablet   Yes No   Sig: Take 0.1 mg by mouth every 4 (four) hours as needed for high blood pressure   clopidogrel (PLAVIX) 75 mg tablet   Yes No   Sig: Take 75 mg by mouth daily   diazepam (VALIUM) 10 mg tablet   Yes No   Sig: Take 10 mg by mouth every 6 (six) hours as needed for anxiety   doxazosin (CARDURA) 8 MG tablet   Yes No   Sig: Take 8 mg by mouth daily at bedtime   dutasteride (AVODART) 0.5 mg capsule   Yes No   Sig: Take 0.5 mg by mouth daily   gabapentin (NEURONTIN) 100 mg capsule    No No   Sig: Take 1 capsule (100 mg total) by mouth 3 (three) times a day   ibuprofen (MOTRIN) 600 mg tablet   Yes No   Sig: Take by mouth every 6 (six) hours as needed for mild pain   lisinopril (ZESTRIL) 20 mg tablet   Yes No   Sig: Take 40 mg by mouth daily   lisinopril (ZESTRIL) 20 mg tablet   No No   Sig: Take 1 tablet (20 mg total) by mouth every evening   metFORMIN (GLUCOPHAGE) 1000 MG tablet   Yes No   Sig: Take 1,000 mg by mouth 2 (two) times a day   mirtazapine (REMERON SOL-TAB) 15 mg disintegrating tablet   Yes No   Sig: Take 15 mg by mouth daily at bedtime      Facility-Administered Medications: None       Past Medical History:   Diagnosis Date    COPD (chronic obstructive pulmonary disease) (HCC)     Diabetes mellitus (HCC)     Hypertension     Stroke (HCC)     TIA (transient ischemic attack)        History reviewed. No pertinent surgical history.    History reviewed. No pertinent family history.  I have reviewed and agree with the history as documented.    E-Cigarette/Vaping     E-Cigarette/Vaping Substances     Social History     Tobacco Use    Smoking status: Every Day     Types: Cigarettes    Smokeless tobacco: Never    Tobacco comments:     8 cigarettes a day   Substance Use Topics    Alcohol use: Not Currently     Comment: at [employer]    Drug use: Yes     Types: Hydrocodone        Review of Systems   Constitutional:  Negative for fever.   Respiratory:  Positive for shortness of breath.    Cardiovascular:  Positive for chest pain and leg swelling.   Gastrointestinal:  Negative for abdominal pain.       Physical Exam  ED Triage Vitals [06/13/24 0447]   Temperature Pulse Respirations Blood Pressure SpO2   97.9 °F (36.6 °C) 79 18 150/96 94 %      Temp Source Heart Rate Source Patient Position - Orthostatic VS BP Location FiO2 (%)   Oral Monitor Lying Right arm --      Pain Score       8             Orthostatic Vital Signs  Vitals:    06/13/24 0447 06/13/24 0600 06/13/24 0645 06/13/24 0800   BP:  150/96   125/80   Pulse: 79 86 78 75   Patient Position - Orthostatic VS: Lying   Lying       Physical Exam  Vitals and nursing note reviewed.   Constitutional:       General: He is in acute distress.      Appearance: Normal appearance.   HENT:      Head: Normocephalic and atraumatic.      Mouth/Throat:      Mouth: Mucous membranes are moist.   Eyes:      Conjunctiva/sclera: Conjunctivae normal.   Cardiovascular:      Rate and Rhythm: Normal rate and regular rhythm.      Pulses: Normal pulses.      Heart sounds: Normal heart sounds.      Comments: Trace bilateral lower extremity edema  Pulmonary:      Effort: Pulmonary effort is normal.      Breath sounds: Wheezing and rhonchi present.   Abdominal:      General: There is distension.      Palpations: Abdomen is soft.      Tenderness: There is no abdominal tenderness.   Musculoskeletal:         General: No tenderness.      Cervical back: Neck supple.   Skin:     General: Skin is warm and dry.      Capillary Refill: Capillary refill takes less than 2 seconds.   Neurological:      General: No focal deficit present.      Mental Status: He is alert.      Cranial Nerves: No facial asymmetry.      Comments: Patient opens eyes and answers questions to verbal stimulation, but at baseline has eyes closed   Psychiatric:         Mood and Affect: Mood normal.         ED Medications  Medications   acetaminophen (TYLENOL) tablet 650 mg (has no administration in time range)   aspirin chewable tablet 81 mg (81 mg Oral Not Given 6/13/24 0800)   atorvastatin (LIPITOR) tablet 40 mg (has no administration in time range)   clopidogrel (PLAVIX) tablet 75 mg (75 mg Oral Not Given 6/13/24 0800)   nicotine (NICODERM CQ) 21 mg/24 hr TD 24 hr patch 1 patch (has no administration in time range)   heparin (porcine) subcutaneous injection 5,000 Units (0 Units Subcutaneous Hold 6/13/24 0809)   insulin lispro (HumALOG/ADMELOG) 100 units/mL subcutaneous injection 1-5 Units (has no administration in  time range)   levalbuterol (XOPENEX) inhalation solution 1.25 mg (has no administration in time range)   ipratropium (ATROVENT) 0.02 % inhalation solution 0.5 mg (has no administration in time range)   methylPREDNISolone sodium succinate (Solu-MEDROL) injection 40 mg (has no administration in time range)   thiamine tablet 100 mg (100 mg Oral Not Given 6/13/24 0810)   folic acid (FOLVITE) tablet 1 mg (1 mg Oral Not Given 6/13/24 0809)   multivitamin-minerals (CENTRUM) tablet 1 tablet (1 tablet Oral Not Given 6/13/24 0809)   lidocaine (LIDODERM) 5 % patch 1 patch (has no administration in time range)   albuterol inhalation solution 10 mg (10 mg Nebulization Given 6/13/24 0450)   ipratropium (ATROVENT) 0.02 % inhalation solution 1 mg (1 mg Nebulization Given 6/13/24 0450)   sodium chloride 0.9 % inhalation solution 12 mL (12 mL Nebulization Given 6/13/24 0451)       Diagnostic Studies  Results Reviewed       Procedure Component Value Units Date/Time    Blood gas, venous [[id number]]     Lab Status: No result Specimen: Blood     Hemoglobin A1c w/EAG Estimation (Prechecked if no A1C within 90 days) [[id number]]  (Abnormal) Collected: 06/13/24 0752    Lab Status: Final result Specimen: Blood from Arm, Left Updated: 06/13/24 0806     Hemoglobin A1C 7.0 %       mg/dl     HS Troponin I 2hr [[id number]]  (Normal) Collected: 06/13/24 0641    Lab Status: Final result Specimen: Blood from Arm, Left Updated: 06/13/24 0737     hs TnI 2hr 15 ng/L      Delta 2hr hsTnI -2 ng/L     Blood gas, venous [[id number]]  (Abnormal) Collected: 06/13/24 0641    Lab Status: Final result Specimen: Blood from Arm, Left Updated: 06/13/24 0657     pH, Venkatesh 7.323     pCO2, Venkatesh 61.4 mm Hg      pO2, Venkatesh 57.4 mm Hg      HCO3, Venkatesh 31.1 mmol/L      Base Excess, Venkatesh 3.2 mmol/L      O2 Content, Venkatesh 18.1 ml/dL      O2 HGB, VENOUS 85.5 %     HS Troponin I 4hr [[id number]]     Lab Status: No result Specimen: Blood     Procalcitonin [[id number]]  (Normal)  Collected: 06/13/24 0449    Lab Status: Final result Specimen: Blood from Arm, Left Updated: 06/13/24 0534     Procalcitonin 0.11 ng/ml     Basic metabolic panel [[id number]]  (Abnormal) Collected: 06/13/24 0449    Lab Status: Final result Specimen: Blood from Arm, Left Updated: 06/13/24 0532     Sodium 140 mmol/L      Potassium 3.8 mmol/L      Chloride 101 mmol/L      CO2 32 mmol/L      ANION GAP 7 mmol/L      BUN 13 mg/dL      Creatinine 0.78 mg/dL      Glucose 189 mg/dL      Calcium 9.1 mg/dL      eGFR 98 ml/min/1.73sq m     Narrative:      National Kid[employer] Foundation guidelines for Chronic Kidney Disease (CKD):     Stage 1 with normal or high GFR (GFR > 90 mL/min/1.73 square meters)    Stage 2 Mild CKD (GFR = 60-89 mL/min/1.73 square meters)    Stage 3A Moderate CKD (GFR = 45-59 mL/min/1.73 square meters)    Stage 3B Moderate CKD (GFR = 30-44 mL/min/1.73 square meters)    Stage 4 Severe CKD (GFR = 15-29 mL/min/1.73 square meters)    Stage 5 End Stage CKD (GFR <15 mL/min/1.73 square meters)  Note: GFR calculation is accurate only with a steady state creatinine    HS Troponin 0hr (reflex protocol) [[id number]]  (Normal) Collected: 06/13/24 0449    Lab Status: Final result Specimen: Blood from Arm, Left Updated: 06/13/24 0532     hs TnI 0hr 17 ng/L     B-Type Natriuretic Peptide(BNP) [[id number]]  (Normal) Collected: 06/13/24 0449    Lab Status: Final result Specimen: Blood from Arm, Left Updated: 06/13/24 0531     BNP 20 pg/mL     Blood gas, venous [[id number]]  (Abnormal) Collected: 06/13/24 0449    Lab Status: Final result Specimen: Blood from Arm, Left Updated: 06/13/24 0520     pH, Venkatesh 7.289     pCO2, Venkatesh 72.4 mm Hg      pO2, Venkatesh 43.6 mm Hg      HCO3, Venkatesh 34.0 mmol/L      Base Excess, Venkatesh 4.4 mmol/L      O2 Content, Venkatesh 16.7 ml/dL      O2 HGB, VENOUS 73.8 %     CBC and differential [[id number]] Collected: 06/13/24 9409    Lab Status: Final result Specimen: Blood from Arm, Left Updated: 06/13/24 0506     WBC  4.83 Thousand/uL      RBC 4.82 Million/uL      Hemoglobin 14.6 g/dL      Hematocrit 44.5 %      MCV 92 fL      MCH 30.3 pg      MCHC 32.8 g/dL      RDW 12.4 %      MPV 10.6 fL      Platelets 236 Thousands/uL      nRBC 0 /100 WBCs      Segmented % 51 %      Immature Grans % 0 %      Lymphocytes % 35 %      Monocytes % 10 %      Eosinophils Relative 3 %      Basophils Relative 1 %      Absolute Neutrophils 2.45 Thousands/µL      Absolute Immature Grans 0.02 Thousand/uL      Absolute Lymphocytes 1.70 Thousands/µL      Absolute Monocytes 0.49 Thousand/µL      Eosinophils Absolute 0.14 Thousand/µL      Basophils Absolute 0.03 Thousands/µL                    XR chest 1 view portable   ED Interpretation by Kathy Sheth DO (06/13 0656)   Mildly worse perihilar thickening compared to prior CXR. No pneumothorax            Procedures  Procedures      ED Course  ED Course as of 06/13/24 0830   Thu Jun 13, 2024   0500 Procedure Note: EKG  Date/Time: 06/13/24 5:00 AM   Interpreted by: Kathy Sheth  Indications / Diagnosis: chest pain / SOB  ECG reviewed by me, the ED Provider: yes   The EKG demonstrates:  Rate: 81  Rhythm: NSR  Intervals: regular  Axis: regular  QRS/Blocks: regular  ST Changes: No acute ST Changes, no STD/MAGGY.  Compared to prior EKG on 6/11/24, no acute change.      0508 WBC: 4.83   0535 pCO2, Venkatesh(!!): 72.4   0536 hs TnI 0hr: 17  Will check 2 hour delta troponin    0636 Will re-check VBG at 7am to check for improvement on BiPAP. If improving will admit to IM, if worsening / unimproved will talk to critical care   0700 pH, Venkatesh: 7.323  Repeat pH and CO2 improving with BiPAP. Will talk to medical service for admission                              SBIRT 22yo+      Flowsheet Row Most Recent Value   Initial Alcohol Screen: US AUDIT-C     1. How often do you have a drink containing alcohol? 0 Filed at: 06/13/2024 0450   2. How many drinks containing alcohol do you have on a typical day you are drinking?  0 Filed  at: 06/13/2024 0450   3a. Male UNDER 65: How often do you have five or more drinks on one occasion? 0 Filed at: 06/13/2024 0450   3b. FEMALE Any Age, or MALE 65+: How often do you have 4 or more drinks on one occassion? 0 Filed at: 06/13/2024 0450   Audit-C Score 0 Filed at: 06/13/2024 0450   VANNA: How many times in the past year have you...    Used an illegal drug or used a prescription medication for non-medical reasons? Never Filed at: 06/13/2024 0450                  Medical Decision Making  Amount and/or Complexity of Data Reviewed  Labs: ordered. Decision-making details documented in ED Course.  Radiology: ordered and independent interpretation performed.    Risk  Prescription drug management.  Decision regarding hospitalization.    Patient is a 58 y.o. male with PMH of COPD who presents to the ED with shortness of breath, chest pain, altered mental status.    Vital signs oxygen saturation 92% on room air. On exam patient ill appearing, rhonchi, increased work of breathing.    History and physical exam most consistent with COPD exacerbation. However, differential diagnosis included but not limited to ACS, pneumonia, arrhythmia.     Plan: cardiac workup, CXR, PEDROZA neb, procalcitionin    View ED course above for further discussion on patient workup.     On review of previous records reviewed discharge summary from recent admission.    All labs reviewed and utilized in the medical decision making process  All radiology studies independently viewed by me and interpreted by the radiologist.  I reviewed all testing with the patient.     Upon re-evaluation patients mental status improving, comfortable on bipap.    Disposition: I discussed the case with SLIM ttending.  We reviewed the HPI, pertinent PMH, ED course and workup. Agreed with plan and will admit the patient to the hospital for further evaluation and management of hypercarbia. Patient in stable condition at this time.       Portions of the record may have  "been created with voice recognition software. Occasional wrong word or \"sound a like\" substitutions may have occurred due to the inherent limitations of voice recognition software. Read the chart carefully and recognize, using context, where substitutions have occurred.        Disposition  Final diagnoses:   Altered mental status   Hypercapnemia     Time reflects when diagnosis was documented in both MDM as applicable and the Disposition within this note       Time User Action Codes Description Comment    6/13/2024  7:41 AM Kathy Sheth [R41.82] Altered mental status     6/13/2024  7:42 AM Kathy Sheth [R06.89] Hypercapnemia           ED Disposition       ED Disposition   Admit    Condition   Stable    Date/Time   Thu Jun 13, 2024 0741    Comment   Case was discussed with Dr. Gonzales and the patient's admission status was agreed to be Admission Status: inpatient status to the service of Dr. Gonzales .               Follow-up Information    None         Patient's Medications   Discharge Prescriptions    No medications on file     No discharge procedures on file.    PDMP Review       None             ED Provider  Attending physically available and evaluated Kyler Almaguer. I managed the patient along with the ED Attending.    Electronically Signed by           Kathy Sheth DO  06/13/24 0831    "

## 2024-06-13 NOTE — ASSESSMENT & PLAN NOTE
Presented to the ER early this AM from [facility] with increased somnolence   Hypersomnolent on exam  VB.28/72/43/34  Started on Bipap in the ER and repeat VBG an hour later with VB.32//57/31  Evaluated by CC in ER and ok for SLIM  Recently hospitalized yesterday for Encephalopathy thought 2/2 Polypharmacy; Patient underwent CTA H&N yesterday and was evaluated by Neurology and mentation improved and thus MRI was deferred and patient's symptoms attributed to metabolic encephalopathy   pAppears patient was recently started on keppra 750mg bid for 10 days, Valium prn, clonidine and robaxin; UDS yesterday was positive for benzos yesterday   Reported history of alcohol abuse, crack abuse

## 2024-06-13 NOTE — CONSULTS
Consult - Critical Care   Kyler Almaguer 58 y.o. male MRN: [id number]  Unit/Bed#: ED 01 Encounter: [id number]      -------------------------------------------------------------------------------------------------------------    History of Present Illness     Kyler Almaguer is a 58 y.o. male who with history of polysubstance use, HTN, COPD?  CVA?  On DAPT, DM2, presented from alcohol rehab for altered mental status.    Patient was just discharged from the hospital the day before due to acute encephalopathy in the setting of polypharmacy use for alcohol withdrawal.  He had extensive workup including brain imaging that is unremarkable.  He was on Valium, Keppra, clonidine and decision was made to discontinue Keppra during hospital stay.  He was discharged back to acute rehab however was brought back due to chest pain and altered mental status.    At ER, he had workup negative for alcohol level, positive for benzos under drug screen and mild hypercapnia on initial VBG.  On exam he was wheezing.  He was placed on BiPAP and was given steroids and bronchodilator.  We were consulted for continuous BiPAP use.  Noted repeat VBG showed improvement of CO2 level.    Upon my evaluation, patient is sleeping but arousable.  He is clear oriented to name, time and place.  He endorses no chest discomfort or respiratory distress.  He can speak in full sentence.  On exam he has no wheezing.      -------------------------------------------------------------------------------------------------------------  Assessment and Plan:    Toxic encephalopathy, multifactorial, in the setting of polypharmacy use  Acute hypoxic respiratory failure, slowly improving  Polysubstance use disorder  COPD, questionable in exacerbation  HTN  DM2  History of CVA?  On DAPT    -Patient is awake and clear upon evaluation.  He has compensating VBG showing not hypercapnic.  I assume his altered mental status might be in the setting of poly pharmacy use, noted urine  drug screen was positive for benzos which I assume it was related to Valium.  -I will try to wean off BiPAP and put as as needed basis.  -If he remains off BiPAP, he does not need critical care level of care.  However, I will still recommend continue working up for his waxing waning mental status.    I discussed my above assessment to the ER physician.  I informed him if there is any change of his mental status or respiratory conditions please reach out for any further evaluation questions.    Disposition: Continue Stepdown Level 2 level of care     --------------------------------------------------------------------------------------------------------------  Review of Systems   Reason unable to perform ROS: poor historian, did not have any acute complaints.   Constitutional:  Negative for activity change and fatigue.   Respiratory:  Negative for cough, chest tightness, shortness of breath and wheezing.    Cardiovascular:  Negative for chest pain.   Gastrointestinal:  Negative for abdominal pain.   Musculoskeletal:  Negative for back pain.         Physical Exam  Constitutional:       Appearance: He is not ill-appearing.   Cardiovascular:      Heart sounds: Normal heart sounds. No murmur heard.  Pulmonary:      Effort: Pulmonary effort is normal. No respiratory distress.      Breath sounds: No wheezing.   Abdominal:      Palpations: Abdomen is soft.   Musculoskeletal:         General: Normal range of motion.   Skin:     General: Skin is warm.   Neurological:      Mental Status: He is alert.      Comments: Easy to awake          --------------------------------------------------------------------------------------------------------------  Vitals:   Vitals:    06/13/24 0447 06/13/24 0550 06/13/24 0600 06/13/24 0645   BP: 150/96      BP Location: Right arm      Pulse: 79  86 78   Resp: 18  22 21   Temp: 97.9 °F (36.6 °C)      TempSrc: Oral      SpO2: 94% 98% 96% 97%     Temp  Min: 97.9 °F (36.6 °C)  Max: 97.9 °F (36.6  °C)        There is no height or weight on file to calculate BMI.      Laboratory and Diagnostics:  Results from last 7 days   Lab Units 06/13/24  0449 06/11/24  1042   WBC Thousand/uL 4.83 6.01   HEMOGLOBIN g/dL 14.6 14.5   HEMATOCRIT % 44.5 43.6   PLATELETS Thousands/uL 236 221   SEGS PCT % 51 70   MONO PCT % 10 8   EOS PCT % 3 2     Results from last 7 days   Lab Units 06/13/24  0449 06/11/24  0921   SODIUM mmol/L 140 141   POTASSIUM mmol/L 3.8 4.2   CHLORIDE mmol/L 101 101   CO2 mmol/L 32 33*   ANION GAP mmol/L 7 7   BUN mg/dL 13 12   CREATININE mg/dL 0.78 0.73   CALCIUM mg/dL 9.1 9.5   GLUCOSE RANDOM mg/dL 189* 211*   ALT U/L  --  79*   AST U/L  --  37   ALK PHOS U/L  --  84   ALBUMIN g/dL  --  3.9   TOTAL BILIRUBIN mg/dL  --  0.25                       ABG:    VBG:  Results from last 7 days   Lab Units 06/13/24  0641   PH SERENITY  7.323   PCO2 SERENITY mm Hg 61.4*   PO2 SERENITY mm Hg 57.4*   HCO3 SERENITY mmol/L 31.1*   BASE EXC SERENITY mmol/L 3.2     Results from last 7 days   Lab Units 06/13/24  0449   PROCALCITONIN ng/ml 0.11       Imaging: I have personally reviewed pertinent reports.      Historical Information   Past Medical History:   Diagnosis Date    COPD (chronic obstructive pulmonary disease) (HCC)     Diabetes mellitus (HCC)     Hypertension     Stroke (HCC)     TIA (transient ischemic attack)      History reviewed. No pertinent surgical history.  Social History   Social History     Substance and Sexual Activity   Alcohol Use Not Currently    Comment: at [employer]     Social History     Substance and Sexual Activity   Drug Use Yes    Types: Hydrocodone     Social History     Tobacco Use   Smoking Status Every Day    Types: Cigarettes   Smokeless Tobacco Never   Tobacco Comments    8 cigarettes a day     Medications:  Current Facility-Administered Medications   Medication Dose Route Frequency    albuterol (PROVENTIL HFA,VENTOLIN HFA) inhaler 2 puff  2 puff Inhalation Q4H PRN     Home medications:  Prior to Admission  Medications   Prescriptions Last Dose Informant Patient Reported? Taking?   QUEtiapine (SEROquel) 50 mg tablet   Yes No   Sig: Take 50 mg by mouth daily at bedtime   acetaminophen (TYLENOL) 325 mg tablet   No No   Sig: Take 3 tablets (975 mg total) by mouth every 8 (eight) hours   albuterol (PROVENTIL HFA,VENTOLIN HFA) 90 mcg/act inhaler   Yes No   Sig: Inhale 2 puffs every 6 (six) hours as needed for wheezing   aspirin 81 mg chewable tablet   Yes No   Sig: Chew 81 mg daily   atorvastatin (LIPITOR) 40 mg tablet   Yes No   Sig: Take 40 mg by mouth daily   cloNIDine (CATAPRES) 0.1 mg tablet   Yes No   Sig: Take 0.1 mg by mouth every 4 (four) hours as needed for high blood pressure   clopidogrel (PLAVIX) 75 mg tablet   Yes No   Sig: Take 75 mg by mouth daily   diazepam (VALIUM) 10 mg tablet   Yes No   Sig: Take 10 mg by mouth every 6 (six) hours as needed for anxiety   doxazosin (CARDURA) 8 MG tablet   Yes No   Sig: Take 8 mg by mouth daily at bedtime   dutasteride (AVODART) 0.5 mg capsule   Yes No   Sig: Take 0.5 mg by mouth daily   gabapentin (NEURONTIN) 100 mg capsule   No No   Sig: Take 1 capsule (100 mg total) by mouth 3 (three) times a day   ibuprofen (MOTRIN) 600 mg tablet   Yes No   Sig: Take by mouth every 6 (six) hours as needed for mild pain   lisinopril (ZESTRIL) 20 mg tablet   Yes No   Sig: Take 40 mg by mouth daily   lisinopril (ZESTRIL) 20 mg tablet   No No   Sig: Take 1 tablet (20 mg total) by mouth every evening   metFORMIN (GLUCOPHAGE) 1000 MG tablet   Yes No   Sig: Take 1,000 mg by mouth 2 (two) times a day   mirtazapine (REMERON SOL-TAB) 15 mg disintegrating tablet   Yes No   Sig: Take 15 mg by mouth daily at bedtime      Facility-Administered Medications: None     Allergies:  Allergies   Allergen Reactions    Penicillin V Hives     -------------------------------------------------------------------------------    Boone Adan MD        Portions of the record may have been created with voice  "recognition software.  Occasional wrong word or \"sound a like\" substitutions may have occurred due to the inherent limitations of voice recognition software.  Read the chart carefully and recognize, using context, where substitutions have occurred   "

## 2024-06-13 NOTE — ASSESSMENT & PLAN NOTE
Noted to have nondisplaced right sixth and seventh rib fracture on rib series  Patient reportedly fell and hit the corner of a table a few days ago  Incentive spirometry  Pain control with Tylenol, lidocaine patch and as needed oxycodone

## 2024-06-13 NOTE — ASSESSMENT & PLAN NOTE
Continue pta aspirin, statin, plavix   Evaluated by neurology yesterday when patient had AMS similar to today and low suspicion for CVA given that mentation improved to baseline rather rapidly yesterday   Low suspicion for CVA currently given hypercapnia

## 2024-06-13 NOTE — RESPIRATORY THERAPY NOTE
RT Protocol Note  Kyler Almaguer 58 y.o. male MRN: [id number]  Unit/Bed#: ED 01 Encounter: [id number]    Assessment    Active Problems:  There are no active Hospital Problems.      Home Pulmonary Medications:  Albuterol MDI       Past Medical History:   Diagnosis Date    COPD (chronic obstructive pulmonary disease) (HCC)     Diabetes mellitus (HCC)     Hypertension     Stroke (HCC)     TIA (transient ischemic attack)      Social History     Socioeconomic History    Marital status: /Civil Union     Spouse name: None    Number of children: None    Years of education: None    Highest education level: None   Occupational History    None   Tobacco Use    Smoking status: Every Day     Types: Cigarettes    Smokeless tobacco: Never    Tobacco comments:     8 cigarettes a day   Substance and Sexual Activity    Alcohol use: Not Currently     Comment: at "[employer]    Drug use: Yes     Types: Hydrocodone    Sexual activity: None   Other Topics Concern    None   Social History Narrative    None     Social Determinants of Health     Financial Resource Strain: Not on file   Food Insecurity: Patient Unable To Answer (6/11/2024)    Hunger Vital Sign     Worried About Running Out of Food in the Last Year: Patient unable to answer     Ran Out of Food in the Last Year: Patient unable to answer   Transportation Needs: Patient Unable To Answer (6/11/2024)    PRAPARE - Transportation     Lack of Transportation (Medical): Patient unable to answer     Lack of Transportation (Non-Medical): Patient unable to answer   Physical Activity: Not on file   Stress: Not on file   Social Connections: Not on file   Intimate Partner Violence: Not on file   Housing Stability: Patient Unable To Answer (6/11/2024)    Housing Stability Vital Sign     Unable to Pay for Housing in the Last Year: Patient unable to answer     Number of Times Moved in the Last Year: Not on file     Homeless in the Last Year: Patient unable to answer       Subjective          Objective    Physical Exam:   Assessment Type: Assess only  General Appearance: Drowsy  Respiratory Pattern: Labored, Accessory muscle use  Chest Assessment: Chest expansion symmetrical  Bilateral Breath Sounds: Diminished, Coarse    Vitals:  Blood pressure 150/96, pulse 86, temperature 97.9 °F (36.6 °C), temperature source Oral, resp. rate 22, SpO2 96%.          Imaging and other studies: I have personally reviewed pertinent reports.            Plan    Respiratory Plan: Mild Distress pathway        Resp Comments: pt assessed per respiratory protocol at this time, called to ED to place pt on BiPAP due to increased work of breathing, pt with a history of COPD and takes albuterol MDI prn at home, bs are diminished and coarse and spo2 98%, CXR is clear, will follow pt home regimen and order albuterol mdi prn, will continue to monitor pt per respiratory protocol.

## 2024-06-13 NOTE — ED NOTES
Pt refusing to wear Bipap mask; ED admitting team made aware     Stuart Alcaraz RN  06/13/24 1038

## 2024-06-13 NOTE — ASSESSMENT & PLAN NOTE
Continue PTA aspirin, statin and Plavix  Previously admitted with encephalopathy likely TME related to hypercapnia  Mentation currently at baseline and improving

## 2024-06-13 NOTE — ASSESSMENT & PLAN NOTE
Lab Results   Component Value Date    HGBA1C 7.0 (H) 06/13/2024       Recent Labs     06/11/24  1323 06/11/24  1642 06/12/24  1041 06/13/24  0834   POCGLU 134 193* 175* 168*       Blood Sugar Average: Last 72 hrs:    SSI  Stable

## 2024-06-13 NOTE — H&P
[facility]  H&P  Name: Kyler Almaguer 58 y.o. male I MRN: [id number]  Unit/Bed#: ED 01 I Date of Admission: 2024   Date of Service: 2024 I Hospital Day: 0      Assessment & Plan   * Acute respiratory failure with hypoxia and hypercapnia (HCC)  Assessment & Plan  Presented to the ER early this AM from [facility] with increased somnolence   Hypersomnolent on exam  VB.28/72/43/34  Started on Bipap in the ER and repeat VBG an hour later with VB.32/61/57/31  Evaluated by CC in ER and ok for SLIM  Recently hospitalized yesterday for Encephalopathy thought 2/2 Polypharmacy; Patient underwent CTA H&N yesterday and was evaluated by Neurology and mentation improved and thus MRI was deferred and patient's symptoms attributed to metabolic encephalopathy   Appears patient was recently started on keppra 750mg bid for 10 days, Valium prn, clonidine and robaxin; UDS yesterday was positive for benzos yesterday   Reported history of alcohol abuse, crack abuse   Admit to medicine SD level 2. Will continue bipap as mentation improving on it. Will additionally treat for COPD with scheduled nebulizers and solumedrol bid. Hold pta medications until mentation improving. CIWA protocol. Will additionally consult Pulmonology team on case    Rib fracture  Assessment & Plan  Rib series yesterday with subtle nondisplaced R sixth and seventh rib fractures  Lidocaine patch   Incentive spirometry   Pulm consult     Alcohol abuse  Assessment & Plan  CIWA protocol with daily thiamine and folate    History of stroke  Assessment & Plan  Continue pta aspirin, statin, plavix   Evaluated by neurology yesterday when patient had AMS similar to today and low suspicion for CVA given that mentation improved to baseline rather rapidly yesterday   Low suspicion for CVA currently given hypercapnia    Type 2 diabetes mellitus without complication, without long-term current use of insulin  ([facility])  Assessment & Plan  Lab Results   Component Value Date    HGBA1C 7.0 (H) 06/13/2024       Recent Labs     06/11/24  0910 06/11/24  1323 06/11/24  1642 06/12/24  1041   POCGLU 213* 134 193* 175*       Blood Sugar Average: Last 72 hrs:    SSI while hospitalized every 6 hours     Encephalopathy  Assessment & Plan  Thought to be etiology of AMS  Holding pta cns affecting medications   CIWA protocol                VTE Prophylaxis: Heparin  / sequential compression device and foot pump applied   Code Status: Level 1 - Full Code       Anticipated Length of Stay:  Patient will be admitted on an Inpatient basis with an anticipated length of stay of  > 2 midnights.   Justification for Hospital Stay: Please see detailed plans noted above.    Chief Complaint:     AMS, Hypoxic and Hypercapnic respiratory failure   History of Present Illness:  Kyler Almaguer is a 58 y.o. male who has past medical history significant for Alcohol abuse, Crack abuse, COPD, Obesity, CVA on asa/plavix who presented to [facility] ER on the AM of 6/13 with AMS as well as hypercapnia from Nursing facility. Patient was seen yesterday in the ER at [facility] for AMS and evaluated by Neurology and etiology thought 2/2 polypharmacy as patient's mentation improved over the course of several hours. Patient of note had recently been started on keppra for 10 days, valium prn, clonidine and robaxin. Patient had his PTA medications held and his mentation improved throughout the day. With patient's mentation back to his baseline he was discharged back to his SNF. Patient reportedly did ok throughout the evening but then this AM patient was hypersomnolent and difficult to arouse prompting patient to be sent back to the ER. In the ER, patient found to be hypercapnic and placed on bipap. After being on bipap for about an hour patient with subtle improvement in imentation although still somewhat somnolent.       Review of Systems: Unable to be reliably completed 2/2 patient's  somnolence    Past Medical and Surgical History:   Past Medical History:   Diagnosis Date    COPD (chronic obstructive pulmonary disease) (HCC)     Diabetes mellitus (HCC)     Hypertension     Stroke (HCC)     TIA (transient ischemic attack)      History reviewed. No pertinent surgical history.    Meds/Allergies:  (Not in a hospital admission)      Allergies:   Allergies   Allergen Reactions    Penicillin V Hives       History:  Marital Status: /Civil Union     Substance Use History:   Social History     Substance and Sexual Activity   Alcohol Use Not Currently    Comment: at [employer]     Social History     Tobacco Use   Smoking Status Every Day    Types: Cigarettes   Smokeless Tobacco Never   Tobacco Comments    8 cigarettes a day     Social History     Substance and Sexual Activity   Drug Use Yes    Types: Hydrocodone       Family History:  History reviewed. No pertinent family history.    Physical Exam:     Vitals:   Blood Pressure: 150/96 (06/13/24 0447)  Pulse: 78 (06/13/24 0645)  Temperature: 97.9 °F (36.6 °C) (06/13/24 0447)  Temp Source: Oral (06/13/24 0447)  Respirations: 21 (06/13/24 0645)  SpO2: 97 % (06/13/24 0645)    Constitutional:  Somnolent but arousable to sternal rub, Non-toxic appearance  Eyes:  EOMI, No scleral icterus   HENT:   oropharynx moist, external ears normal, external nose normal   Respiratory:  rhonchi and wheezing  Cardiovascular:  Normal rate, no murmurs   GI:  Soft, nondistended, no guarding   :  No costovertebral angle tenderness   Musculoskeletal:  no tenderness, no deformities. Back- no tenderness  Integument:  no jaundice, no rash   Neurologic:  somnolent but arousable to sternal rub, moves extremities spotaneously        Lab Results: I have personally reviewed pertinent reports.      Results from last 7 days   Lab Units 06/13/24  0449   WBC Thousand/uL 4.83   HEMOGLOBIN g/dL 14.6   HEMATOCRIT % 44.5   PLATELETS Thousands/uL 236   SEGS PCT % 51   LYMPHO PCT % 35  "  MONO PCT % 10   EOS PCT % 3     Results from last 7 days   Lab Units 06/13/24  0449 06/11/24  0921   POTASSIUM mmol/L 3.8 4.2   CHLORIDE mmol/L 101 101   CO2 mmol/L 32 33*   BUN mg/dL 13 12   CREATININE mg/dL 0.78 0.73   CALCIUM mg/dL 9.1 9.5   ALK PHOS U/L  --  84   ALT U/L  --  79*   AST U/L  --  37             Imaging: I have personally reviewed pertinent reports.      XR ribs 2 vw right    Result Date: 6/12/2024  Narrative: XR RIBS 2 VW RIGHT INDICATION: r/o rib fracture, hx of fall with hitting corner of the table to the right chest wall. COMPARISON: Chest x-ray 6/11/2024 FINDINGS: There is suggestion of subtle nondisplaced right sixth and seventh rib fractures anterolaterally best seen on the oblique position image as slight cortical buckling of the sixth rib and subtle cortical disruption of the seventh rib No pneumothorax or pleural effusion. Clear lungs. Normal cardiomediastinal silhouette. Normal upper abdomen.     Impression: Findings suggestive of subtle nondisplaced right sixth and seventh rib fractures No acute cardiopulmonary disease. Workstation performed: [id number]     XR chest portable    Result Date: 6/11/2024  Narrative: XR CHEST PORTABLE INDICATION: chest pain. COMPARISON: None FINDINGS: Clear lungs. No pneumothorax or pleural effusion. Normal cardiomediastinal silhouette. Bones are unremarkable for age.     Impression: No active pulmonary disease. Workstation performed: [id number]     CTA stroke alert (head/neck)    Result Date: 6/11/2024  Narrative: CTA NECK AND BRAIN WITH CONTRAST INDICATION: Stroke Alert  Per epic chart review: CVA/TIA-like Symptoms   Per pt he stated \"I think I had a TIA an hour ago, I have hx strokes and this is how I felt the last time.\" Pt feels his speech is garbled. COMPARISON:   Same day CT stroke alert brain. TECHNIQUE:   Post contrast imaging was performed after administration of iodinated contrast through the neck and brain. Post contrast axial 0.625 mm images " timed to opacify the arterial system. 3D rendering was performed on an independent workstation.   MIP reconstructions performed. Coronal reconstructions were performed of the noncontrast portion of the brain. Radiation dose length product (DLP) for this visit: 617.70 mGy-cm.  This examination, like all CT scans performed in the [facility], was performed utilizing techniques to minimize radiation dose exposure, including the use of iterative reconstruction and automated exposure control. IV Contrast: 85 mL Omnipaque 350 IMAGE QUALITY:   Diagnostic FINDINGS: CERVICAL VASCULATURE AORTIC ARCH AND GREAT VESSELS: Mild calcified atherosclerotic disease of aortic arch. Normal great vessel origins. Normal visualized subclavian vessels. RIGHT VERTEBRAL ARTERY CERVICAL SEGMENT:  Normal origin. The vessel is normal in caliber throughout the neck. LEFT VERTEBRAL ARTERY CERVICAL SEGMENT:  Normal origin. The vessel is normal in caliber throughout the neck. Motion artifact in V3 segment. RIGHT EXTRACRANIAL CAROTID SEGMENT: Motion artifact [id number] common carotid artery. Mild atherosclerotic disease of the distal common carotid artery and proximal cervical internal carotid artery without significant stenosis compared to the more distal ICA. Less than 50% stenosis.  No dissection. LEFT EXTRACRANIAL CAROTID SEGMENT: Motion artifact [id number] common carotid artery. Mild atherosclerotic disease of the distal common carotid artery and proximal cervical internal carotid artery without significant stenosis compared to the more distal ICA. Less than 50% stenosis.  No dissection. NASCET criteria was used to determine the degree of internal carotid artery diameter stenosis. INTRACRANIAL VASCULATURE INTERNAL CAROTID ARTERIES:  Normal enhancement of the intracranial portions of the internal carotid arteries with mild calcified atherosclerotic disease bilaterally.  Normal ophthalmic artery origins.  Normal ICA terminus.  "ANTERIOR CIRCULATION:  Symmetric A1 segments and anterior cerebral arteries with normal enhancement.  Normal anterior communicating artery. MIDDLE CEREBRAL ARTERY CIRCULATION:  M1 segment and middle cerebral artery branches demonstrate normal enhancement bilaterally. DISTAL VERTEBRAL ARTERIES:  Normal distal vertebral arteries.  Posterior inferior cerebellar artery origins are normal. Normal vertebral basilar junction. BASILAR ARTERY:  Basilar artery is normal in caliber.  Normal superior cerebellar arteries. POSTERIOR CEREBRAL ARTERIES: Both posterior cerebral arteries arises from the basilar tip.  Both arteries demonstrate normal enhancement. VENOUS STRUCTURES:  Normal. NON VASCULAR ANATOMY BONY STRUCTURES:  No acute osseous abnormality given mild motion artifact in mid cervical and craniocervical junction regions. Multiple missing teeth. SOFT TISSUES OF THE NECK:  Normal. THORACIC INLET: Emphysema. No focal consolidation in visualized upper lung zones.     Impression: Negative CTA head and neck for large vessel occlusion, dissection, aneurysm, or high-grade stenosis given motion artifact in mid neck and craniocervical junction regions. Additional chronic/incidental findings as detailed above. Findings were directly discussed with Abimael Caban at approximately 9:32 a.m. at 6/11/2024. Workstation performed: [id number]     CT stroke alert brain    Result Date: 6/11/2024  Narrative: CT BRAIN - STROKE ALERT PROTOCOL INDICATION:   Stroke Alert. Per epic chart review: CVA/TIA-like Symptoms   Per pt he stated \"I think I had a TIA an hour ago, I have hx strokes and this is how I felt the last time.\" Pt feels his speech is garbled. COMPARISON: Same day CTA stroke alert head neck. TECHNIQUE:  CT examination of the brain was performed.  In addition to axial images, coronal reformatted images were created and submitted for interpretation. Radiation dose length product (DLP) for this visit: 923.40 mGy-cm.  This examination, " like all CT scans performed in the [facility], was performed utilizing techniques to minimize radiation dose exposure, including the use of iterative reconstruction and automated exposure control. IMAGE QUALITY:  Diagnostic. FINDINGS: PARENCHYMA:  No intracranial mass, mass effect or midline shift. No CT signs of acute infarction.  No acute parenchymal hemorrhage. Todd cisterna magna, normal variant. Mild calcification of the cavernous internal carotid arteries. VENTRICLES AND EXTRA-AXIAL SPACES:  Normal for the patient's age. VISUALIZED ORBITS: Normal visualized orbits. PARANASAL SINUSES: Mild mucosal thickening of the visualized paranasal sinuses. CALVARIUM AND EXTRACRANIAL SOFT TISSUES:   Normal.     Impression: No acute intracranial abnormality. Findings were directly discussed with Abimael Caban at approximately 9:32 a.m. at 6/11/2024. Workstation performed: [id number]       Total time for visit, including counseling/coordination of care: 45 minutes. Greater than 50% of this total time spent on direct patient counseling and coorination of care.     Epic Records Reviewed as well as Records in Care Everywhere    ** Please Note: Dragon 360 Dictation voice to text software was used in the creation of this document. **

## 2024-06-13 NOTE — QUICK NOTE
-Spoke with ER who reports patient was evaluated by Critical Care Fellow and ok for SLIM. Will admit to sd level 2 under slim.

## 2024-06-13 NOTE — ASSESSMENT & PLAN NOTE
Presented to the ER early this AM from [facility] with increased somnolence   Hypersomnolent on exam  VB.28/72/43/34  Started on Bipap in the ER and repeat VBG an hour later with VB.32/61/57/31  Evaluated by CC in ER and ok for SLIM  Recently hospitalized yesterday for Encephalopathy thought 2/2 Polypharmacy; Patient underwent CTA H&N yesterday and was evaluated by Neurology and mentation improved and thus MRI was deferred and patient's symptoms attributed to metabolic encephalopathy   Appears patient was recently started on keppra 750mg bid for 10 days, Valium prn, clonidine and robaxin; UDS yesterday was positive for benzos yesterday   Reported history of alcohol abuse, crack abuse   Admit to medicine SD level 2. Will continue bipap as mentation improving on it. Will additionally treat for COPD with scheduled nebulizers and solumedrol bid. Hold pta medications until mentation improving. [facility] protocol. Will additionally consult Pulmonology team on case

## 2024-06-13 NOTE — CONSULTS
Consultation - Pulmonary Medicine   Kyler Almaguer 58 y.o. male MRN: [id number]  Unit/Bed#: ED 01 Encounter: [id number]      Assessment:  Acute hypercapnic respiratory failure  Improved with BiPAP  COPD exacerbation  Moderate COPD, group E  PFT in 2015 FEV1 58%  On albuterol at home.  No other inhalers  Alcohol use  4 days since last drink, drinks about 2 packs of beer  History of CVA  On DAPT    Plan:   Titrate supplemental oxygen to achieve SpO2 88-92%  Continue with Solu-Medrol 40 mg IV BID for 5 days  Cw xopenex/atrovent nebs  Follow up blood cultures x2, sputum culture, procal, CRP  Start doxycycline 100mg IV BID for 5 days  Airway clearance protocol: flutter valve, xopenex neb  BiPAP  CIWA protocol    History of Present Illness   Physician Requesting Consult: Huma Doran DO  Reason for Consult / Principal Problem: COPD exacerbation  Hx and PE limited by: patient agitation    HPI: Kyler Almaguer is a 58 y.o. year old male with past medical history of COPD group B, alcohol abuse who presents with shortness of breath, productive cough, increased sputum production, change in sputum color over the past week. Patient was transferred over from [facility] due to increased somnolence. He was found to be hypercapnic was evaluated by critical care, and placed on BiPAP. pCO2 had since improved and patient mental status improved, so he was deemed stable for admission to medical floors. Pulmonary medicine was consulted for COPD exacerbation.    Of note, he has been detoxing from alcohol as he drinks about 2 cases of beer, and that his last drink was 4 days ago.      Inpatient consult to Pulmonology  Consult performed by: Jackson Reyez DO  Consult ordered by: Aris Gonzales DO        Review of Systems   Constitutional:  Positive for chills and fever.   HENT:  Negative for congestion, drooling, facial swelling, mouth sores and postnasal drip.    Respiratory:  Positive for cough (productive cough). Negative for  shortness of breath.    Cardiovascular:  Negative for palpitations and leg swelling.   Gastrointestinal:  Negative for abdominal pain.   Endocrine: Negative for polyphagia.   Genitourinary:  Negative for difficulty urinating.   Musculoskeletal:  Negative for back pain.   Neurological:  Negative for dizziness and light-headedness.   Psychiatric/Behavioral:  Negative for agitation.        Historical Information   Past Medical History:   Diagnosis Date    COPD (chronic obstructive pulmonary disease) (HCC)     Diabetes mellitus (HCC)     Hypertension     Stroke (HCC)     TIA (transient ischemic attack)      History reviewed. No pertinent surgical history.  Social History   Social History     Substance and Sexual Activity   Alcohol Use Not Currently    Comment: at [employer]     Social History     Substance and Sexual Activity   Drug Use Yes    Types: Hydrocodone     E-Cigarette/Vaping     E-Cigarette/Vaping Substances     Social History     Tobacco Use   Smoking Status Every Day    Types: Cigarettes   Smokeless Tobacco Never   Tobacco Comments    8 cigarettes a day     Meds/Allergies   all current active meds have been reviewed    Allergies   Allergen Reactions    Penicillin V Hives       Objective   Vitals: Blood pressure (!) 177/92, pulse 77, temperature 97.9 °F (36.6 °C), temperature source Oral, resp. rate 20, SpO2 90%.,There is no height or weight on file to calculate BMI.  No intake or output data in the 24 hours ending 06/13/24 1302  Invasive Devices       Peripheral Intravenous Line  Duration             Peripheral IV 06/13/24 Left;Proximal;Ventral (anterior) Forearm <1 day                    Physical Exam  HENT:      Head: Normocephalic.      Mouth/Throat:      Mouth: Mucous membranes are moist.   Cardiovascular:      Rate and Rhythm: Normal rate and regular rhythm.      Heart sounds: Normal heart sounds.   Pulmonary:      Effort: Pulmonary effort is normal. No respiratory distress.      Breath sounds: No  stridor. Rhonchi (RML/RLL) present. No wheezing or rales.      Comments: Decreased breath sounds to LLL field  Chest:      Chest wall: No tenderness.   Musculoskeletal:         General: Normal range of motion.      Cervical back: Normal range of motion.   Skin:     Capillary Refill: Capillary refill takes less than 2 seconds.   Neurological:      Mental Status: He is alert and oriented to person, place, and time.   Psychiatric:         Mood and Affect: Mood normal.      Comments: Agitated in room       Lab Results: I have personally reviewed pertinent lab results.  Imaging Studies: I have personally reviewed pertinent films in PACS  EKG, Pathology, and Other Studies: I have personally reviewed pertinent films in PACS    Code Status: Level 1 - Full Code  Advance Directive and Living Will:      Power of :    POLST:      Jackson Reyez DO PGY-IV  Pulmonary Critical Care Fellow  [facility]

## 2024-06-13 NOTE — Clinical Note
Case was discussed with Dr. Gonzales and the patient's admission status was agreed to be Admission Status: inpatient status to the service of Dr. Gonzales .

## 2024-06-13 NOTE — UTILIZATION REVIEW
"NOTIFICATION OF INPATIENT ADMISSION   AUTHORIZATION REQUEST   SERVICING FACILITY:   [facility]  Address: [address] [place] [address]  Tax ID: [id number]  NPI: [id number] ATTENDING PROVIDER:  Attending Name and NPI#: Huma Doran Do [[id number]]  Address: [address] [place] [address]  Phone: [phone]   ADMISSION INFORMATION:  Place of Service: Inpatient [facility]  Place of Service Code: 21  Inpatient Admission Date/Time: 6/13/24  7:43 AM  Discharge Date/Time: 6/13/2024  1:34 PM  Admitting Diagnosis Code/Description:  Altered mental status [R41.82]     UTILIZATION REVIEW CONTACT:  Alfreda \"Kortney\" Gary Utilization   Network Utilization Review Department  Phone: [phone]  Fax: [phone]  Email: [email or web address]  Contact for approvals/pending authorizations, clinical reviews, and discharge.     PHYSICIAN ADVISORY SERVICES:  Medical Necessity Denial & [place] Review  Phone: [phone]  Fax: [phone]  Email: [email or web address]     DISCHARGE SUPPORT TEAM:  For Patients Discharge Needs & Updates  Phone: [phone] opt. 2 Fax: [phone]  Email: [email or web address]     "

## 2024-06-13 NOTE — ED PROVIDER NOTES
"History  Chief Complaint   Patient presents with    Shortness of Breath     [place] with [place], \"I can't breathe, I was just in the back the doctor didn't come to see me\"     HPI    Patient presenting to the ED for evaluation of shortness of breath and wheezing. Hx COPD. Not on O2 at baseline. Has never been intubated. Also currently rehabbing at [facility] for alcohol abuse. Per chart review, patient was seen earlier in the ED and was admitted for COPD exacerbation. He was discharged from the hospital AMA about 30 minutes prior to arrival because he was \"frustrated and wanted to leave.\" Patient was briefly on BiPAP in the ED, improved hypercapnia on re-check of VBG. Once discharged, patient felt worse again, and asked to return to the hospital. On exam, patient is tachycardic, tachypneic, complaining of chest soreness from known rib fractures. Also complaining of increased anxiety. Lungs bilateral wheezes. Reports last EtOH intake 3 days ago. Patient is willing to stay in the hospital now. Denies fevers, chills, abdominal pain, vomiting.    Prior to Admission Medications   Prescriptions Last Dose Informant Patient Reported? Taking?   QUEtiapine (SEROquel) 50 mg tablet   Yes No   Sig: Take 50 mg by mouth daily at bedtime   acetaminophen (TYLENOL) 325 mg tablet   No No   Sig: Take 3 tablets (975 mg total) by mouth every 8 (eight) hours   albuterol (PROVENTIL HFA,VENTOLIN HFA) 90 mcg/act inhaler   Yes No   Sig: Inhale 2 puffs every 6 (six) hours as needed for wheezing   aspirin 81 mg chewable tablet   Yes No   Sig: Chew 81 mg daily   atorvastatin (LIPITOR) 40 mg tablet   Yes No   Sig: Take 40 mg by mouth daily   cloNIDine (CATAPRES) 0.1 mg tablet   Yes No   Sig: Take 0.1 mg by mouth every 4 (four) hours as needed for high blood pressure   clopidogrel (PLAVIX) 75 mg tablet   Yes No   Sig: Take 75 mg by mouth daily   diazepam (VALIUM) 10 mg tablet   Yes No   Sig: Take 10 mg by mouth every 6 (six) hours as " needed for anxiety   doxazosin (CARDURA) 8 MG tablet   Yes No   Sig: Take 8 mg by mouth daily at bedtime   dutasteride (AVODART) 0.5 mg capsule   Yes No   Sig: Take 0.5 mg by mouth daily   gabapentin (NEURONTIN) 100 mg capsule   No No   Sig: Take 1 capsule (100 mg total) by mouth 3 (three) times a day   ibuprofen (MOTRIN) 600 mg tablet   Yes No   Sig: Take by mouth every 6 (six) hours as needed for mild pain   lisinopril (ZESTRIL) 20 mg tablet   Yes No   Sig: Take 40 mg by mouth daily   lisinopril (ZESTRIL) 20 mg tablet   No No   Sig: Take 1 tablet (20 mg total) by mouth every evening   metFORMIN (GLUCOPHAGE) 1000 MG tablet   Yes No   Sig: Take 1,000 mg by mouth 2 (two) times a day   mirtazapine (REMERON SOL-TAB) 15 mg disintegrating tablet   Yes No   Sig: Take 15 mg by mouth daily at bedtime      Facility-Administered Medications: None       Past Medical History:   Diagnosis Date    COPD (chronic obstructive pulmonary disease) (HCC)     Diabetes mellitus (HCC)     Hypertension     Stroke (HCC)     TIA (transient ischemic attack)        History reviewed. No pertinent surgical history.    History reviewed. No pertinent family history.  I have reviewed and agree with the history as documented.    E-Cigarette/Vaping     E-Cigarette/Vaping Substances     Social History     Tobacco Use    Smoking status: Every Day     Types: Cigarettes    Smokeless tobacco: Never    Tobacco comments:     8 cigarettes a day   Substance Use Topics    Alcohol use: Not Currently     Comment: at [facility]    Drug use: Yes     Types: Hydrocodone        Review of Systems   All other systems reviewed and are negative.      Physical Exam  ED Triage Vitals   Temperature Pulse Respirations Blood Pressure SpO2   06/13/24 1340 06/13/24 1340 06/13/24 1340 06/13/24 1340 06/13/24 1340   98.2 °F (36.8 °C) (!) 111 20 (!) 194/104 93 %      Temp Source Heart Rate Source Patient Position - Orthostatic VS BP Location FiO2 (%)   06/13/24 1340 06/13/24  1340 06/13/24 1340 06/13/24 1340 --   Temporal Monitor Sitting Right arm       Pain Score       06/13/24 1442       9             Orthostatic Vital Signs  Vitals:    06/13/24 1340 06/13/24 1448 06/13/24 1450   BP: (!) 194/104  147/99   Pulse: (!) 111 96    Patient Position - Orthostatic VS: Sitting         Physical Exam  Vitals and nursing note reviewed.   Constitutional:       General: He is not in acute distress.     Appearance: He is well-developed. He is obese. He is not ill-appearing, toxic-appearing or diaphoretic.   HENT:      Head: Normocephalic and atraumatic.      Mouth/Throat:      Mouth: Mucous membranes are moist.   Eyes:      Conjunctiva/sclera: Conjunctivae normal.   Neck:      Vascular: No JVD.   Cardiovascular:      Rate and Rhythm: Regular rhythm. Tachycardia present.      Pulses: Normal pulses.      Heart sounds: Normal heart sounds. No murmur heard.  Pulmonary:      Effort: Pulmonary effort is normal. Tachypnea present. No respiratory distress.      Breath sounds: Examination of the right-middle field reveals wheezing. Examination of the left-middle field reveals wheezing. Wheezing present. No decreased breath sounds, rhonchi or rales.   Chest:      Chest wall: Tenderness (R anterior chest wall over known rib fractures; no crepitus) present. No mass.   Abdominal:      Palpations: Abdomen is soft.      Tenderness: There is no abdominal tenderness.   Musculoskeletal:         General: No swelling. Normal range of motion.      Cervical back: Neck supple.      Right lower leg: No tenderness. No edema.      Left lower leg: No tenderness. No edema.   Skin:     General: Skin is warm and dry.      Capillary Refill: Capillary refill takes less than 2 seconds.      Findings: No erythema.   Neurological:      General: No focal deficit present.      Mental Status: He is alert and oriented to person, place, and time.      Comments: No tremors     Psychiatric:         Mood and Affect: Mood is anxious.          ED Medications  Medications   aspirin chewable tablet 81 mg (has no administration in time range)   atorvastatin (LIPITOR) tablet 40 mg (has no administration in time range)   clopidogrel (PLAVIX) tablet 75 mg (has no administration in time range)   nicotine (NICODERM CQ) 21 mg/24 hr TD 24 hr patch 1 patch (has no administration in time range)   heparin (porcine) subcutaneous injection 5,000 Units (5,000 Units Subcutaneous Given 6/13/24 1441)   levalbuterol (XOPENEX) inhalation solution 1.25 mg ( Nebulization Canceled Entry 6/13/24 1422)   ipratropium (ATROVENT) 0.02 % inhalation solution 0.5 mg ( Nebulization Canceled Entry 6/13/24 1422)   methylPREDNISolone sodium succinate (Solu-MEDROL) injection 40 mg (has no administration in time range)   thiamine tablet 100 mg (has no administration in time range)   folic acid (FOLVITE) tablet 1 mg (has no administration in time range)   multivitamin-minerals (CENTRUM) tablet 1 tablet (has no administration in time range)   lidocaine (LIDODERM) 5 % patch 1 patch (has no administration in time range)   diazepam (VALIUM) tablet 5 mg (has no administration in time range)   oxyCODONE (ROXICODONE) split tablet 2.5 mg (has no administration in time range)   doxycycline (VIBRAMYCIN) 100 mg in sodium chloride 0.9 % 100 mL IVPB (100 mg Intravenous New Bag 6/13/24 1447)   acetaminophen (TYLENOL) tablet 975 mg (975 mg Oral Given 6/13/24 1442)   insulin lispro (HumALOG/ADMELOG) 100 units/mL subcutaneous injection 1-5 Units (has no administration in time range)   insulin lispro (HumALOG/ADMELOG) 100 units/mL subcutaneous injection 1-5 Units (has no administration in time range)   doxazosin (CARDURA) tablet 8 mg (has no administration in time range)   lisinopril (ZESTRIL) tablet 40 mg (has no administration in time range)   albuterol inhalation solution 10 mg (10 mg Nebulization Given 6/13/24 1436)   ipratropium (ATROVENT) 0.02 % inhalation solution 1 mg (1 mg Nebulization Given  "6/13/24 1436)   sodium chloride 0.9 % inhalation solution 12 mL (12 mL Nebulization Given 6/13/24 1436)   ketorolac (TORADOL) injection 15 mg (15 mg Intravenous Given 6/13/24 1442)   LORazepam (ATIVAN) injection 1 mg (1 mg Intravenous Given 6/13/24 1441)       Diagnostic Studies  Results Reviewed       Procedure Component Value Units Date/Time    Blood culture [[id number]] Collected: 06/13/24 1443    Lab Status: In process Specimen: Blood from Arm, Left Updated: 06/13/24 1452    Blood culture [[id number]] Collected: 06/13/24 1443    Lab Status: In process Specimen: Blood from Arm, Left Updated: 06/13/24 1452    Strep Pneumoniae, Urine [[id number]] Collected: 06/13/24 1447    Lab Status: In process Specimen: Urine, Other Updated: 06/13/24 1452    Sputum culture and Gram stain [[id number]]     Lab Status: No result Specimen: Expectorated Sputum                    No orders to display         Procedures  Procedures      ED Course  ED Course as of 06/13/24 1459   Thu Jun 13, 2024   1401 Patient presenting to the ED for evaluation of shortness of breath and wheezing. Hx COPD. Not on O2 at baseline. Has never been intubated. Also currently rehabbing at [facility] for alcohol abuse. Per chart review, patient was seen earlier in the ED and was admitted for COPD exacerbation. He was discharged from the hospital AMA about 30 minutes prior to arrival because he was \"frustrated and wanted to leave.\" Patient was briefly on BiPAP in the ED, improved hypercapnia on re-check of VBG. Once discharged, patient felt worse again, and asked to return to the hospital. On exam, patient is tachycardic, tachypneic, complaining of chest soreness from known rib fractures. Also complaining of increased anxiety. Lungs bilateral wheezes. Reports last EtOH intake 3 days ago. Patient is willing to stay in the hospital now. Will treat with bronchodilators, Ativan for anxiety likely related withdrawal component. Patient received IV steroids, and Mg earlier " in the ED. Will re-check CBC and BMP. EKG ordered r/o STEMI and arrhythmia.    EKG reviewed and was not concerning for STEMI earlier. Troponin 17>13>13 and stable.    Reviewed CXR from earlier today. Patient has PVC, but clinically no evidence of acute hypervolemia. Discussed the case with [facility]; they will evaluate and re-admit the patient.                             SBIRT 22yo+      Flowsheet Row Most Recent Value   Initial Alcohol Screen: US AUDIT-C     1. How often do you have a drink containing alcohol? 0 Filed at: 06/13/2024 1343   3a. Male UNDER 65: How often do you have five or more drinks on one occasion? 0 Filed at: 06/13/2024 1343   3b. FEMALE Any Age, or MALE 65+: How often do you have 4 or more drinks on one occassion? 0 Filed at: 06/13/2024 1343   Audit-C Score 0 Filed at: 06/13/2024 1343   VANNA: How many times in the past year have you...    Used an illegal drug or used a prescription medication for non-medical reasons? Never Filed at: 06/13/2024 1343                  Medical Decision Making  Amount and/or Complexity of Data Reviewed  Labs: ordered.    Risk  Prescription drug management.  Decision regarding hospitalization.          Disposition  Final diagnoses:   COPD with acute exacerbation (HCC)   Alcohol withdrawal (HCC)   History of encephalopathy     Time reflects when diagnosis was documented in both MDM as applicable and the Disposition within this note       Time User Action Codes Description Comment    6/13/2024  1:56 PM Marcus Ricardo [J44.1] COPD with acute exacerbation (HCC)     6/13/2024  2:11 PM Marcus Ricardo [F10.939] Alcohol withdrawal (HCC)     6/13/2024  2:12 PM Marcus Ricardo [Z86.69] History of encephalopathy           ED Disposition       ED Disposition   Admit    Condition   Stable    Date/Time   Thu Jun 13, 2024 1401    Comment   Admit to [facility]             Follow-up Information    None         Patient's Medications   Discharge Prescriptions    No medications on file      No discharge procedures on file.    PDMP Review       None             ED Provider  Attending physically available and evaluated Kyler Almaguer. I managed the patient along with the ED Attending.    Electronically Signed by           Marcus Ricardo DO  06/13/24 6232

## 2024-06-14 ENCOUNTER — APPOINTMENT (INPATIENT)
Dept: RADIOLOGY | Facility: HOSPITAL | Age: 59
DRG: 189 | End: 2024-06-14
Payer: COMMERCIAL

## 2024-06-14 LAB
ANION GAP SERPL CALCULATED.3IONS-SCNC: 11 MMOL/L (ref 4–13)
BASOPHILS # BLD AUTO: 0.01 THOUSANDS/ÂΜL (ref 0–0.1)
BASOPHILS NFR BLD AUTO: 0 % (ref 0–1)
BUN SERPL-MCNC: 19 MG/DL (ref 5–25)
CALCIUM SERPL-MCNC: 9.2 MG/DL (ref 8.4–10.2)
CHLORIDE SERPL-SCNC: 98 MMOL/L (ref 96–108)
CO2 SERPL-SCNC: 27 MMOL/L (ref 21–32)
CREAT SERPL-MCNC: 0.69 MG/DL (ref 0.6–1.3)
EOSINOPHIL # BLD AUTO: 0.01 THOUSAND/ÂΜL (ref 0–0.61)
EOSINOPHIL NFR BLD AUTO: 0 % (ref 0–6)
ERYTHROCYTE [DISTWIDTH] IN BLOOD BY AUTOMATED COUNT: 12.1 % (ref 11.6–15.1)
GFR SERPL CREATININE-BSD FRML MDRD: 103 ML/MIN/1.73SQ M
GLUCOSE SERPL-MCNC: 209 MG/DL (ref 65–140)
GLUCOSE SERPL-MCNC: 224 MG/DL (ref 65–140)
GLUCOSE SERPL-MCNC: 224 MG/DL (ref 65–140)
GLUCOSE SERPL-MCNC: 287 MG/DL (ref 65–140)
GLUCOSE SERPL-MCNC: 406 MG/DL (ref 65–140)
HCT VFR BLD AUTO: 46 % (ref 36.5–49.3)
HGB BLD-MCNC: 15.4 G/DL (ref 12–17)
IMM GRANULOCYTES # BLD AUTO: 0.08 THOUSAND/UL (ref 0–0.2)
IMM GRANULOCYTES NFR BLD AUTO: 1 % (ref 0–2)
L PNEUMO1 AG UR QL IA.RAPID: NEGATIVE
LYMPHOCYTES # BLD AUTO: 0.75 THOUSANDS/ÂΜL (ref 0.6–4.47)
LYMPHOCYTES NFR BLD AUTO: 8 % (ref 14–44)
MCH RBC QN AUTO: 30.3 PG (ref 26.8–34.3)
MCHC RBC AUTO-ENTMCNC: 33.5 G/DL (ref 31.4–37.4)
MCV RBC AUTO: 91 FL (ref 82–98)
MONOCYTES # BLD AUTO: 0.33 THOUSAND/ÂΜL (ref 0.17–1.22)
MONOCYTES NFR BLD AUTO: 3 % (ref 4–12)
NEUTROPHILS # BLD AUTO: 8.76 THOUSANDS/ÂΜL (ref 1.85–7.62)
NEUTS SEG NFR BLD AUTO: 88 % (ref 43–75)
NRBC BLD AUTO-RTO: 0 /100 WBCS
PLATELET # BLD AUTO: 277 THOUSANDS/UL (ref 149–390)
PMV BLD AUTO: 10.7 FL (ref 8.9–12.7)
POTASSIUM SERPL-SCNC: 4.7 MMOL/L (ref 3.5–5.3)
PROCALCITONIN SERPL-MCNC: 0.07 NG/ML
RBC # BLD AUTO: 5.08 MILLION/UL (ref 3.88–5.62)
SODIUM SERPL-SCNC: 136 MMOL/L (ref 135–147)
WBC # BLD AUTO: 9.94 THOUSAND/UL (ref 4.31–10.16)

## 2024-06-14 PROCEDURE — 94760 N-INVAS EAR/PLS OXIMETRY 1: CPT | Performed by: SOCIAL WORKER

## 2024-06-14 PROCEDURE — 87449 NOS EACH ORGANISM AG IA: CPT

## 2024-06-14 PROCEDURE — 71045 X-RAY EXAM CHEST 1 VIEW: CPT

## 2024-06-14 PROCEDURE — 94640 AIRWAY INHALATION TREATMENT: CPT | Performed by: SOCIAL WORKER

## 2024-06-14 PROCEDURE — 82948 REAGENT STRIP/BLOOD GLUCOSE: CPT

## 2024-06-14 PROCEDURE — 97166 OT EVAL MOD COMPLEX 45 MIN: CPT

## 2024-06-14 PROCEDURE — 85025 COMPLETE CBC W/AUTO DIFF WBC: CPT | Performed by: STUDENT IN AN ORGANIZED HEALTH CARE EDUCATION/TRAINING PROGRAM

## 2024-06-14 PROCEDURE — 99232 SBSQ HOSP IP/OBS MODERATE 35: CPT

## 2024-06-14 PROCEDURE — 94640 AIRWAY INHALATION TREATMENT: CPT

## 2024-06-14 PROCEDURE — 84145 PROCALCITONIN (PCT): CPT

## 2024-06-14 PROCEDURE — 94664 DEMO&/EVAL PT USE INHALER: CPT | Performed by: SOCIAL WORKER

## 2024-06-14 PROCEDURE — 94760 N-INVAS EAR/PLS OXIMETRY 1: CPT

## 2024-06-14 PROCEDURE — 80048 BASIC METABOLIC PNL TOTAL CA: CPT | Performed by: STUDENT IN AN ORGANIZED HEALTH CARE EDUCATION/TRAINING PROGRAM

## 2024-06-14 PROCEDURE — 97163 PT EVAL HIGH COMPLEX 45 MIN: CPT

## 2024-06-14 RX ORDER — ALBUTEROL SULFATE 2.5 MG/3ML
2.5 SOLUTION RESPIRATORY (INHALATION) EVERY 4 HOURS PRN
Status: DISCONTINUED | OUTPATIENT
Start: 2024-06-14 | End: 2024-06-18 | Stop reason: HOSPADM

## 2024-06-14 RX ORDER — INSULIN LISPRO 100 [IU]/ML
2 INJECTION, SOLUTION INTRAVENOUS; SUBCUTANEOUS
Status: DISCONTINUED | OUTPATIENT
Start: 2024-06-14 | End: 2024-06-15

## 2024-06-14 RX ORDER — INSULIN LISPRO 100 [IU]/ML
1-6 INJECTION, SOLUTION INTRAVENOUS; SUBCUTANEOUS
Status: DISCONTINUED | OUTPATIENT
Start: 2024-06-14 | End: 2024-06-16

## 2024-06-14 RX ORDER — HYDRALAZINE HYDROCHLORIDE 20 MG/ML
5 INJECTION INTRAMUSCULAR; INTRAVENOUS EVERY 6 HOURS PRN
Status: DISCONTINUED | OUTPATIENT
Start: 2024-06-14 | End: 2024-06-15

## 2024-06-14 RX ORDER — PREDNISONE 20 MG/1
40 TABLET ORAL DAILY
Status: DISCONTINUED | OUTPATIENT
Start: 2024-06-15 | End: 2024-06-18 | Stop reason: HOSPADM

## 2024-06-14 RX ORDER — INSULIN LISPRO 100 [IU]/ML
2-12 INJECTION, SOLUTION INTRAVENOUS; SUBCUTANEOUS
Status: DISCONTINUED | OUTPATIENT
Start: 2024-06-14 | End: 2024-06-16

## 2024-06-14 RX ADMIN — DIAZEPAM 10 MG: 5 TABLET ORAL at 11:37

## 2024-06-14 RX ADMIN — LIDOCAINE 1 PATCH: 50 PATCH CUTANEOUS at 08:31

## 2024-06-14 RX ADMIN — DIAZEPAM 10 MG: 5 TABLET ORAL at 23:10

## 2024-06-14 RX ADMIN — CLOPIDOGREL BISULFATE 75 MG: 75 TABLET, FILM COATED ORAL at 08:31

## 2024-06-14 RX ADMIN — LISINOPRIL 40 MG: 20 TABLET ORAL at 08:30

## 2024-06-14 RX ADMIN — ACETAMINOPHEN 975 MG: 325 TABLET, FILM COATED ORAL at 06:38

## 2024-06-14 RX ADMIN — OXYCODONE HYDROCHLORIDE 5 MG: 5 TABLET ORAL at 09:45

## 2024-06-14 RX ADMIN — DOXYCYCLINE 100 MG: 100 INJECTION, POWDER, LYOPHILIZED, FOR SOLUTION INTRAVENOUS at 02:59

## 2024-06-14 RX ADMIN — LEVALBUTEROL HYDROCHLORIDE 1.25 MG: 1.25 SOLUTION RESPIRATORY (INHALATION) at 19:33

## 2024-06-14 RX ADMIN — DOXYCYCLINE 100 MG: 100 INJECTION, POWDER, LYOPHILIZED, FOR SOLUTION INTRAVENOUS at 14:35

## 2024-06-14 RX ADMIN — ACETAMINOPHEN 975 MG: 325 TABLET, FILM COATED ORAL at 14:34

## 2024-06-14 RX ADMIN — FOLIC ACID 1 MG: 1 TABLET ORAL at 08:30

## 2024-06-14 RX ADMIN — THIAMINE HCL TAB 100 MG 100 MG: 100 TAB at 08:30

## 2024-06-14 RX ADMIN — HEPARIN SODIUM 5000 UNITS: 5000 INJECTION INTRAVENOUS; SUBCUTANEOUS at 06:43

## 2024-06-14 RX ADMIN — HYDRALAZINE HYDROCHLORIDE 5 MG: 20 INJECTION, SOLUTION INTRAMUSCULAR; INTRAVENOUS at 18:07

## 2024-06-14 RX ADMIN — INSULIN LISPRO 2 UNITS: 100 INJECTION, SOLUTION INTRAVENOUS; SUBCUTANEOUS at 12:14

## 2024-06-14 RX ADMIN — METHYLPREDNISOLONE SODIUM SUCCINATE 40 MG: 40 INJECTION, POWDER, FOR SOLUTION INTRAMUSCULAR; INTRAVENOUS at 08:31

## 2024-06-14 RX ADMIN — ACETAMINOPHEN 975 MG: 325 TABLET, FILM COATED ORAL at 21:01

## 2024-06-14 RX ADMIN — ATORVASTATIN CALCIUM 40 MG: 40 TABLET, FILM COATED ORAL at 16:33

## 2024-06-14 RX ADMIN — HEPARIN SODIUM 5000 UNITS: 5000 INJECTION INTRAVENOUS; SUBCUTANEOUS at 14:34

## 2024-06-14 RX ADMIN — IPRATROPIUM BROMIDE 0.5 MG: 0.5 SOLUTION RESPIRATORY (INHALATION) at 19:33

## 2024-06-14 RX ADMIN — IPRATROPIUM BROMIDE 0.5 MG: 0.5 SOLUTION RESPIRATORY (INHALATION) at 14:01

## 2024-06-14 RX ADMIN — INSULIN LISPRO 4 UNITS: 100 INJECTION, SOLUTION INTRAVENOUS; SUBCUTANEOUS at 12:14

## 2024-06-14 RX ADMIN — ASPIRIN 81 MG CHEWABLE TABLET 81 MG: 81 TABLET CHEWABLE at 08:30

## 2024-06-14 RX ADMIN — INSULIN LISPRO 6 UNITS: 100 INJECTION, SOLUTION INTRAVENOUS; SUBCUTANEOUS at 21:01

## 2024-06-14 RX ADMIN — INSULIN LISPRO 6 UNITS: 100 INJECTION, SOLUTION INTRAVENOUS; SUBCUTANEOUS at 17:16

## 2024-06-14 RX ADMIN — OXYCODONE HYDROCHLORIDE 5 MG: 5 TABLET ORAL at 20:44

## 2024-06-14 RX ADMIN — INSULIN LISPRO 2 UNITS: 100 INJECTION, SOLUTION INTRAVENOUS; SUBCUTANEOUS at 08:32

## 2024-06-14 RX ADMIN — METHYLPREDNISOLONE SODIUM SUCCINATE 40 MG: 40 INJECTION, POWDER, FOR SOLUTION INTRAMUSCULAR; INTRAVENOUS at 20:44

## 2024-06-14 RX ADMIN — NICOTINE 1 PATCH: 21 PATCH, EXTENDED RELEASE TRANSDERMAL at 08:31

## 2024-06-14 RX ADMIN — INSULIN LISPRO 2 UNITS: 100 INJECTION, SOLUTION INTRAVENOUS; SUBCUTANEOUS at 17:16

## 2024-06-14 RX ADMIN — IPRATROPIUM BROMIDE 0.5 MG: 0.5 SOLUTION RESPIRATORY (INHALATION) at 07:06

## 2024-06-14 RX ADMIN — INSULIN LISPRO 4 UNITS: 100 INJECTION, SOLUTION INTRAVENOUS; SUBCUTANEOUS at 08:32

## 2024-06-14 RX ADMIN — Medication 1 TABLET: at 08:30

## 2024-06-14 RX ADMIN — LEVALBUTEROL HYDROCHLORIDE 1.25 MG: 1.25 SOLUTION RESPIRATORY (INHALATION) at 07:06

## 2024-06-14 RX ADMIN — DOXAZOSIN 8 MG: 4 TABLET ORAL at 22:51

## 2024-06-14 RX ADMIN — HEPARIN SODIUM 5000 UNITS: 5000 INJECTION INTRAVENOUS; SUBCUTANEOUS at 21:01

## 2024-06-14 RX ADMIN — LEVALBUTEROL HYDROCHLORIDE 1.25 MG: 1.25 SOLUTION RESPIRATORY (INHALATION) at 14:01

## 2024-06-14 NOTE — PLAN OF CARE
Problem: PHYSICAL THERAPY ADULT  Goal: Performs mobility at highest level of function for planned discharge setting.  See evaluation for individualized goals.  Description: Treatment/Interventions: ADL retraining, Functional transfer training, LE strengthening/ROM, Elevations, Therapeutic exercise, Endurance training, Bed mobility, Gait training, Spoke to nursing, OT          See flowsheet documentation for full assessment, interventions and recommendations.  Note: Prognosis: Good  Problem List: Decreased range of motion, Decreased strength, Decreased endurance, Impaired balance, Decreased mobility, Pain  Assessment: PT orders received and acknowledged. Patient was seen today for high complexity PT evaluation. High complexity evaluation due to Ongoing medical management for primary dx, Decreased activity tolerance compared to baseline, Fall risk, Continuous pulse oximetry monitoring  Patient is a 58 y.o. male  who was admitted to [facility] on 6/13/2024  with Acute respiratory failure with hypoxia and hypercapnia (HCC) . Pt presents to [facility] with SOB . Patient performed functional mobility as described above.  At baseline, pt resides with family in house and was independent prior to hospital admission. Currently, upon initial examination, pt  is requiring  supervision   for functional transfers and  contact guard assist x1 for ambulation with no AD.  Patient currently presents below baseline with limitations in gait, balance, and transfers. Patient will benefit from continued PT services while in hospital in order to address remaining limitations. The patients AM-PAC Basic Mobility Inpatient Short From Raw Score is 22 . Based on AM-PAC scoring and patient presentation, PT currently recommending Level III (Minimum Resource Intensity). Please also refer to the recommendation of the Physical Therapist for safe discharge planning.  Barriers to Discharge: Inaccessible home environment     Rehab Resource  Intensity Level, PT: III (Minimum Resource Intensity)    See flowsheet documentation for full assessment.

## 2024-06-14 NOTE — OCCUPATIONAL THERAPY NOTE
Occupational Therapy Evaluation     Patient Name: Kyler Almaguer  Today's Date: 6/14/2024  Problem List  Principal Problem:    Acute respiratory failure with hypoxia and hypercapnia (HCC)  Active Problems:    Primary hypertension    Type 2 diabetes mellitus without complication, without long-term current use of insulin (HCC)    History of stroke    History of crack cocaine use    Alcohol abuse    Rib fracture    Past Medical History  Past Medical History:   Diagnosis Date    COPD (chronic obstructive pulmonary disease) (HCC)     Diabetes mellitus (HCC)     Hypertension     Stroke (HCC)     TIA (transient ischemic attack)      Past Surgical History  History reviewed. No pertinent surgical history.      06/14/24 0835   OT Last Visit   OT Visit Date 06/14/24   Note Type   Note type Evaluation   Pain Assessment   Pain Assessment Tool 0-10   Pain Score 8   Pain Location/Orientation Orientation: Right;Location: Rib Cage   Hospital Pain Intervention(s) Ambulation/increased activity;Repositioned;Emotional support;Rest   Restrictions/Precautions   Weight Bearing Precautions Per Order No   Other Precautions Cognitive;Chair Alarm;Bed Alarm;Telemetry;Fall Risk;Pain   Home Living   Type of Home House   Home Layout Multi-level  (+14 MAGGY)   Bathroom Shower/Tub Tub/shower unit   Bathroom Toilet Raised   Bathroom Equipment Grab bars in shower;Shower chair   Home Equipment Walker;Cane   Prior Function   Level of [place] Independent with ADLs;Independent with IADLS   Lives With Spouse;Other (Comment)  (GARLAND, 3 sons (16, 21, 26 years old))   Receives Help From Family   IADLs Independent with driving;Independent with meal prep;Independent with medication management   Falls in the last 6 months 1 to 4   Vocational On disability   Lifestyle   Autonomy I with ADl's/IADL's, no AD with functional mobility, +drives   Reciprocal Relationships spouse, sons, GARLAND   Service to Others on disability   Intrinsic Gratification joking with people  "\"I am a \"   General   Family/Caregiver Present No   ADL   Eating Assistance 7  Independent   Grooming Assistance 7  Independent   UB Bathing Assistance 5  Supervision/Setup   LB Bathing Assistance 5  Supervision/Setup   UB Dressing Assistance 5  Supervision/Setup   LB Dressing Assistance 5  Supervision/Setup   Toileting Assistance  5  Supervision/Setup   Bed Mobility   Supine to Sit Unable to assess   Sit to Supine Unable to assess   Transfers   Sit to Stand 6  Modified independent   Stand to Sit 6  Modified independent   Additional Comments No AD with functional transfers   Functional Mobility   Functional Mobility 5  Supervision   Additional Comments S without AD household distance functional mobiltiy, impulsive, no overt LOB, +ALVES with SP02 to 87% resovling quickly 91% on room air (history of COPD)   Balance   Static Sitting Normal   Dynamic Sitting Good   Static Standing Fair +   Dynamic Standing Fair   Ambulatory Fair   Activity Tolerance   Activity Tolerance Patient limited by fatigue   Medical Staff Made Aware PT nette   Nurse Made Aware RN cleared pt for therapy   RUE Assessment   RUE Assessment WFL   LUE Assessment   LUE Assessment WFL   Hand Function   Gross Motor Coordination Functional   Fine Motor Coordination Functional   Vision-Basic Assessment   Current Vision Wears glasses all the time   Vision - Complex Assessment   Acuity Able to read clock/calendar on wall without difficulty   Cognition   Overall Cognitive Status WFL   Arousal/Participation Alert;Responsive;Cooperative   Attention Within functional limits   Orientation Level Oriented X4   Memory Within functional limits   Following Commands Follows all commands and directions without difficulty   Comments pt motivated for therapy, good historian, memory, direction following.   Assessment   Assessment Pt is a 58 y.o. male who was admitted to [facility] on 6/13/2024  presents from [facility] alcohol/drug rehab and now here " with with Acute respiratory failure with hypoxia and hypercapnia (HCC) . Patient   has a past medical history of COPD (chronic obstructive pulmonary disease) (HCC), Diabetes mellitus (HCC), Hypertension, Stroke (HCC), and TIA (transient ischemic attack). At baseline pt was completing I with ADL's/IaDL's, no AD with functional mobility +. Pt lives with spouse/GARLAND, sons in a double home with MAGGY. Currently pt requires mod I for overall ADLS and S/mod I without AD for functional mobility/transfers. Pt currently presents with impairments in the following categories -difficulty performing IADLS  activity tolerance. These impairments, as well as pt's fatigue  limit pt's ability to safely engage in all baseline areas of occupation, includingcommunity mobility, driving, house maintenance, medication management, and meal prep From OT standpoint, recommend home with increased social support and no DME needs upon D/C.No further acute OT needs indicated at this time - Anticipate d/c home with family support when medically cleared - d/c from caseload   Goals   Patient Goals go home   Discharge Recommendation   Rehab Resource Intensity Level, OT No post-acute rehabilitation needs   Equipment Recommended (no DME needs)   AM-PAC Daily Activity Inpatient   Lower Body Dressing 4   Bathing 4   Toileting 4   Upper Body Dressing 4   Grooming 4   Eating 4   Daily Activity Raw Score 24   Daily Activity Standardized Score (Calc for Raw Score >=11) 57.54   AM-PAC Applied Cognition Inpatient   Following a Speech/Presentation 4   Understanding Ordinary Conversation 4   Taking Medications 4   Remembering Where Things Are Placed or Put Away 4   Remembering List of 4-5 Errands 4   Taking Care of Complicated Tasks 4   Applied Cognition Raw Score 24   Applied Cognition Standardized Score 62.21   End of Consult   Patient Position at End of Consult Seated edge of bed;Bed/Chair alarm activated;All needs within reach   Nurse Communication Nurse  aware of consult   Martina Strong OTR/L

## 2024-06-14 NOTE — ASSESSMENT & PLAN NOTE
Lab Results   Component Value Date    HGBA1C 7.0 (H) 06/13/2024       Recent Labs     06/13/24  0834 06/13/24  1704 06/13/24 2044 06/14/24  0829   POCGLU 168* 355* 320* 209*       Blood Sugar Average: Last 72 hrs:  (P) 282  Sliding scale algorithm increased   Start humalog 2U TID  Diabetic diet   Continue to adjust as needed

## 2024-06-14 NOTE — UTILIZATION REVIEW
"NOTIFICATION OF INPATIENT ADMISSION   AUTHORIZATION REQUEST   SERVICING FACILITY:   [facility]  Address: [address] [place] [address]  Tax ID: [id number]  NPI: [id number] ATTENDING PROVIDER:  Attending Name and NPI#: Nadine Reyez Md [[id number]]  Address: [address] [place] [address]  Phone: [phone]   ADMISSION INFORMATION:  Place of Service: Inpatient [facility]  Place of Service Code: 21  Inpatient Admission Date/Time: 6/13/24  2:12 PM  Discharge Date/Time: No discharge date for patient encounter.  Admitting Diagnosis Code/Description:  Alcohol withdrawal (HCC) [F10.939]  COPD with acute exacerbation (HCC) [J44.1]  History of encephalopathy [Z86.69]     UTILIZATION REVIEW CONTACT:  Alfreda Burrell"Kortney\" Gary Utilization   Network Utilization Review Department  Phone: [phone]  Fax: [phone]  Email: [email or web address]  Contact for approvals/pending authorizations, clinical reviews, and discharge.     PHYSICIAN ADVISORY SERVICES:  Medical Necessity Denial & [place] Review  Phone: [phone]  Fax: [phone]  Email: [email or web address]     DISCHARGE SUPPORT TEAM:  For Patients Discharge Needs & Updates  Phone: [phone] opt. 2 Fax: [phone]  Email: [email or web address]     "

## 2024-06-14 NOTE — ASSESSMENT & PLAN NOTE
Initially presented to ER from [facility] with increased somnolence and was noted to be in COPD exacerbation.  Patient was started on BiPAP initially and thereafter patient was taken off BiPAP with subsequent improvement in symptoms.  Patient was frustrated and upset about staying in the hospital and therefore left AMA only to return 30 minutes later after he spoke with his wife who told him to come back to the hospital for admission.  Currently saturating well on room air  Does have decreased breath sounds and wheezing on exam concerning for COPD exacerbation - has improved  Blood cultures pending  Strep pneumo urine antigen negative  RP2 negative   BNP 20, CXR with evidence of pulmonary vascular congestion. S/p 1 dose lasix 20mg  Pro-Kannan negative x2, CRP elevated  Continue solumedrol 40mg BID today with plan to transition to oral steroids tomorrow   Pulmonology consulted, appreciate recs - see below   Continue prednisone 40 mg x 5 days - plan to start PO tomorrow   Continue Doxy x 5 days  Neb schedule 3 times daily  Airway clearance  BiPAP as needed  CIWA protocol

## 2024-06-14 NOTE — PHYSICAL THERAPY NOTE
Physical Therapy Evaluation     Patient's Name: Kyler Almaguer    Admitting Diagnosis  Alcohol withdrawal (HCC) [F10.939]  COPD with acute exacerbation (HCC) [J44.1]  History of encephalopathy [Z86.69]    Problem List  Patient Active Problem List   Diagnosis    Encephalopathy    Primary hypertension    Type 2 diabetes mellitus without complication, without long-term current use of insulin (HCC)    History of stroke    History of crack cocaine use    Alcohol abuse    Acute respiratory failure with hypoxia and hypercapnia (HCC)    Rib fracture       Past Medical History  Past Medical History:   Diagnosis Date    COPD (chronic obstructive pulmonary disease) (HCC)     Diabetes mellitus (HCC)     Hypertension     Stroke (HCC)     TIA (transient ischemic attack)        Past Surgical History  History reviewed. No pertinent surgical history.       06/14/24 0845   PT Last Visit   PT Visit Date 06/14/24   Note Type   Note type Evaluation   Pain Assessment   Pain Assessment Tool 0-10   Pain Score 8   Pain Location/Orientation Orientation: Right;Location: Rib Cage   Patient's Stated Pain Goal No pain   Hospital Pain Intervention(s) Repositioned;Ambulation/increased activity   Restrictions/Precautions   Weight Bearing Precautions Per Order No   Other Precautions Fall Risk;Pain   Home Living   Type of Home House   Home Layout Multi-level;Stairs to enter with rails  (14STE)   Bathroom Shower/Tub Tub/shower unit   Bathroom Toilet Raised   Bathroom Equipment Grab bars in shower;Shower chair;Grab bars around toilet   Bathroom Accessibility Accessible   Home Equipment Walker;Cane  (does not use PTA)   Prior Function   Level of [place] Independent with ADLs;Independent with functional mobility;Independent with IADLS   Lives With Spouse;Family  (GARLAND, 3 sons)   Receives Help From Family   IADLs Independent with driving;Independent with meal prep;Independent with medication management   Falls in the last 6 months 1 to 4  (1)    Vocational On disability   General   Family/Caregiver Present No   Cognition   Overall Cognitive Status WFL   Arousal/Participation Alert   Orientation Level Oriented X4   Memory Within functional limits   Following Commands Follows all commands and directions without difficulty   Subjective   Subjective pt pleasant and cooperative throughout therapy session. pt received seated at EOB   RLE Assessment   RLE Assessment WFL   LLE Assessment   LLE Assessment WFL   Bed Mobility   Supine to Sit Unable to assess   Sit to Supine Unable to assess   Transfers   Sit to Stand 5  Supervision   Additional items Increased time required;Verbal cues   Stand to Sit 5  Supervision   Additional items Increased time required;Verbal cues   Additional Comments transfers w RW   Ambulation/Elevation   Gait pattern Improper Weight shift;Wide HOLLIS;Shuffling   Gait Assistance 4  Minimal assist  (CGA)   Additional items Assist x 1;Verbal cues;Tactile cues   Assistive Device None   Distance 125'   Stair Management Assistance Not tested   Balance   Static Sitting Fair +   Dynamic Sitting Fair +   Static Standing Fair   Dynamic Standing Fair -   Ambulatory Poor +   Endurance Deficit   Endurance Deficit Yes   Endurance Deficit Description decreased exercise tolerance, ALVES, SOB   Activity Tolerance   Activity Tolerance Patient limited by fatigue   Medical Staff Made Aware BUD Mack co-cindy due to medical complexity and multiple comorbidities   Nurse Made Aware RN cleared and updated   Assessment   Prognosis Good   Problem List Decreased range of motion;Decreased strength;Decreased endurance;Impaired balance;Decreased mobility;Pain   Assessment PT orders received and acknowledged. Patient was seen today for high complexity PT evaluation. High complexity evaluation due to Ongoing medical management for primary dx, Decreased activity tolerance compared to baseline, Fall risk, Continuous pulse oximetry monitoring  Patient is a 58 y.o. male  who was  admitted to [facility] on 6/13/[facility]24  with Acute respiratory failure with hypoxia and hypercapnia (HCC) . Pt presents to [facility] with SOB . Patient performed functional mobility as described above.  At baseline, pt resides with family in house and was independent prior to hospital admission. Currently, upon initial examination, pt  is requiring  supervision   for functional transfers and  contact guard assist x1 for ambulation with no AD.  Patient currently presents below baseline with limitations in gait, balance, and transfers. Patient will benefit from continued PT services while in hospital in order to address remaining limitations. The patients AM-PAC Basic Mobility Inpatient Short From Raw Score is 22 . Based on AM-PAC scoring and patient presentation, PT currently recommending Level III (Minimum Resource Intensity). Please also refer to the recommendation of the Physical Therapist for safe discharge planning.   Barriers to Discharge Inaccessible home environment   Goals   Patient Goals to go home   STG Expiration Date 06/28/24   Short Term Goal #1 Patient will be able to perform bed mobility tasks with  modified independent   in order to improve overall functional mobility and assist in safe d/c. STG 2 Patient will sit EOB for at least 25 minutes at  modified independent   level in order to strengthen abdominal musculature and assist in future transfers and ambulation. STG 3 Patient will be able to perform functional transfer with  modified independent   in order to improve overall functional mobility and assist in safe discharge. STG 4 Patient will be able to ambulate at least 300 feet with least restrictive device with  modified independent   assist in order to improve overall functional mobility and assist in safe discharge. STG 5 Patient will improve sitting/standing static/dynamic balance 1/2 grade in order to improve functional mobility and assist in safe discharge. STG 6  Patient will be able to  negotiate at least 14 stairs with least restrictive device with  modified independent   A in order to improve overall functional mobility and assist in safe discharge   PT Treatment Day 0   Plan   Treatment/Interventions ADL retraining;Functional transfer training;LE strengthening/ROM;Elevations;Therapeutic exercise;Endurance training;Bed mobility;Gait training;Spoke to nursing;OT   PT Frequency 2-3x/wk   Discharge Recommendation   Rehab Resource Intensity Level, PT III (Minimum Resource Intensity)   AM-PAC Basic Mobility Inpatient   Turning in Flat Bed Without Bedrails 4   Lying on Back to Sitting on Edge of Flat Bed Without Bedrails 4   Moving Bed to Chair 4   Standing Up From Chair Using Arms 4   Walk in Room 3   Climb 3-5 Stairs With Railing 3   Basic Mobility Inpatient Raw Score 22   Basic Mobility Standardized Score 47.4   [facility] Highest Level Of Mobility   -HLM Goal 7: Walk 25 feet or more   -HLM Achieved 7: Walk 25 feet or more   End of Consult   Patient Position at End of Consult Seated edge of bed;All needs within reach         Alex Barrios, PT

## 2024-06-14 NOTE — ASSESSMENT & PLAN NOTE
Noted to have nondisplaced right sixth and seventh rib fracture on rib series  Patient reportedly fell and hit the corner of a table a few days ago  Incentive spirometry  Pain control with Tylenol, lidocaine patch and as needed oxycodone  EKG without arrhythmias x24 hours. Will discontinue

## 2024-06-14 NOTE — CASE MANAGEMENT
Case Management Discharge Planning Note    Patient name Kyler Almaguer  Location /-01 MRN [id number]  : 1965 Date 2024       Current Admission Date: 2024  Current Admission Diagnosis:Acute respiratory failure with hypoxia and hypercapnia (HCC)   Patient Active Problem List    Diagnosis Date Noted Date Diagnosed    Acute respiratory failure with hypoxia and hypercapnia (HCC) 2024     Rib fracture 2024     Encephalopathy 2024     Primary hypertension 2024     Type 2 diabetes mellitus without complication, without long-term current use of insulin (HCC) 2024     History of stroke 2024     History of crack cocaine use 2024     Alcohol abuse 2024       LOS (days): 1  Geometric Mean LOS (GMLOS) (days): 3.6  Days to GMLOS:2.6     OBJECTIVE:  Risk of Unplanned Readmission Score: 14.01         Current admission status: Inpatient   Preferred Pharmacy:   UNKNOWN - FOLLOW UP PRIOR TO DISCHARGE TO E-PRESCRIBE  No address on file      Primary Care Provider: Maisha Mott MD    Primary Insurance: [employer] REP  Secondary Insurance:     DISCHARGE DETAILS:     Fax received from [facility] to obtain pt's consent for funding request. CM assisted pt in completing form. Received TC from [facility] ([phone] Opt 3 ask for [facility]) stating that they received pt's request to be admitted to their facility upon d/c. Clinical information requested to be faxed to [phone]. Called [facility] to confirm receipt. [facility] stated that they received all clinical information, and their physician will need to review chart before making decision on whether to accept. She will return CM's call once decision is made. CM provided CM office phone number for call-back on weekend. CM office to follow for DCP.       **0615 Received callback from [facility], Pt has to be off oxycodone for 24 hours before [facility] would admit, they also require  "repeat CXR and PT/OT notes stating that pt has no post-acute needs. CM relayed information to LAURA ALCANTARA CM office to fax required documents to [facility] at d/c. [facility]'s weekend coverage is Lorelei Peters, at [phone] Opt 3 and ask for Lorelei).                **4924: Pt stated that he could not return home before going to [facility] because he \"could not put his wife through this.\" He would like to stay in-patient until [facility] is able to accept him. He also stated that [facility] has all his medication and he cannot be d/c'd without them. CM called [facility] and spoke to nursing staff (Trevon), who stated that he would have  send medication back to hospital tmr morning. Info relayed to pt and nursing. SIN also reiterated to pt that we are unable to keep pts in the hospital if they are medically stable for d/c.                                                                                                     "

## 2024-06-14 NOTE — RESPIRATORY THERAPY NOTE
Resp care   06/14/24 0706   Inhalation Therapy Tx   $ Inhalation Therapy Performed Yes   Pre-Treatment Pulse 100   Breath Sounds Pre-Treatment Bilateral Diminished;Coarse   Breath Sounds Post-Treatment Bilateral Expiratory wheezes;Inspiratory wheezes   Post-Treatment Pulse 102   Resp Comments Pt offers no resp c/o. I/E wheezing after udn tx. Will add prn HS tx. Pt does not want scheduled night tx.

## 2024-06-14 NOTE — ED ATTENDING ATTESTATION
6/13/2024  IKim MD, saw and evaluated the patient. I have discussed the patient with the resident/non-physician practitioner and agree with the resident's/non-physician practitioner's findings, Plan of Care, and MDM as documented in the resident's/non-physician practitioner's note, except where noted. All available labs and Radiology studies were reviewed.  I was present for key portions of any procedure(s) performed by the resident/non-physician practitioner and I was immediately available to provide assistance.       At this point I agree with the current assessment done in the Emergency Department.  I have conducted an independent evaluation of this patient a history and physical is as follows:    58-year-old male with past medical history of COPD, alcohol use disorder currently undergoing inpatient rehab at [facility], returning for shortness of breath.  Patient was admitted this morning for shortness of breath and was even briefly on BiPAP.  Upon improvement of his symptoms, he signed out of the hospital AGAINST MEDICAL ADVICE, however, once discharged, patient reports feeling worse and being more short of breath.  He is reporting chest soreness due to known rib fractures.  He also feels anxious.  Last alcohol intake was 3 days ago.  Patient is okay to stay in the hospital.  Denies fevers.  Denies abdominal pain.    ED Triage Vitals   Temperature Pulse Respirations Blood Pressure SpO2   06/13/24 1340 06/13/24 1340 06/13/24 1340 06/13/24 1340 06/13/24 1340   98.2 °F (36.8 °C) (!) 111 20 (!) 194/104 93 %      Temp Source Heart Rate Source Patient Position - Orthostatic VS BP Location FiO2 (%)   06/13/24 1340 06/13/24 1340 06/13/24 1340 06/13/24 1340 --   Temporal Monitor Sitting Right arm       Pain Score       06/13/24 1442       9           Vital signs and nursing notes reviewed    ** IF YOU ARE READING THIS, THE EXAM TEMPLATE BELOW HAS NOT BEEN UPDATED**    CONSTITUTIONAL: male appearing  stated age resting in bed, in no acute distress  HEENT: atraumatic, normocephalic. Sclera anicteric, conjunctiva are not injected. Moist oral mucosa  CARDIOVASCULAR/CHEST: RRR, no M/R/G. 2+ radial pulses  PULMONARY: Mildly tachypneic, diffuse wheezing throughout lung fields anteriorly and posteriorly.  ABDOMEN: non-distended. BS present, normoactive. Non-tender  MSK: moves all extremities, no deformities, no peripheral edema, no calf asymmetry  NEURO: Awake, alert, and oriented x 3. Face symmetric. Moves all extremities spontaneously. No focal neurologic deficits  SKIN: Warm, appears well-perfused  MENTAL STATUS: Mildly anxious    Labs Reviewed   STREP PNEUMONIAE ANTIGEN,URINE - Normal       Result Value Ref Range Status    Strep pneumoniae antigen, urine Negative  Negative Final   SPUTUM CULTURE AND GRAM STAIN     No orders to display           ED Course     Medications   doxycycline (VIBRAMYCIN) 100 mg in sodium chloride 0.9 % 100 mL IVPB (100 mg Intravenous New Bag 6/13/24 1447)   albuterol inhalation solution 10 mg (10 mg Nebulization Given 6/13/24 1436)   ipratropium (ATROVENT) 0.02 % inhalation solution 1 mg (1 mg Nebulization Given 6/13/24 1436)   sodium chloride 0.9 % inhalation solution 12 mL (12 mL Nebulization Given 6/13/24 1436)   ketorolac (TORADOL) injection 15 mg (15 mg Intravenous Given 6/13/24 1442)   LORazepam (ATIVAN) injection 1 mg (1 mg Intravenous Given 6/13/24 1441)     58-year-old male presenting with worsening shortness of breath.  Patient admitted for the same earlier today, signed out AMA.  Workup from earlier today reviewed.  EKG to my interpretation as below.  Given wheezing, breathing treatment administered.  Patient was admitted to internal medicine for further evaluation and treatment.      Procedure  ECG 12 Lead Documentation Only    Date/Time: 6/13/2024 2:22 PM    Performed by: Kim Harrison MD  Authorized by: Kim Harrison MD    Comments:      Normal sinus rhythm, ventricular  rate 96, NE interval 144, , QTc 472, normal axis, incomplete right bundle branch block pattern, Q waves in lead V2 suggestive of age-indeterminate septal infarct, overall, no significant change from prior EKG obtained earlier today at 1105.

## 2024-06-14 NOTE — ASSESSMENT & PLAN NOTE
Psychotherapy Discharge Summary    Preferred Name: Kreg Cowden  YOB: 2006    Admission date to psychotherapy: 4/14/22    Referred by: Henry Garcia (guidance counselor at [facility])    Presenting Problem: Angella Galindo presented with anxiety and sad feelings. Course of treatment included : individual therapy     Progress/Outcome of Treatment Goals (brief summary of course of treatment) Angella Galindo was originally working with Bryn Baltazar in the [facility] and then began seeing Jessie Betancourtas, who took over for prior counselor. This therapist began seeing Angella Galindo for counseling through the [facility] on 3/13/23. Aneglla Galindo was guarded for the majority of his sessions but reported that his anxiety was stable. He agreed to having summer sessions; however, he did not show for the two sessions scheduled. Angella Galindo shared on 8/21/23 via phone that he did not want to continue with counseling, as he did not feel a connection with this therapist. Therapist briefly met with him in school on 9/11/23, when he apologized and expressed "it's just me." He shared that his anxiety and depression were stable and that he did not feel he needed a referral to another counselor. He was agreeable to contacting the office if his circumstances change and he feels he needs counseling in the future. Treatment Complications (if any): attendance issues. Treatment Progress: fair    Current [facility] Psychiatric Provider: N/A    Discharge Medications include: N/a    Discharge Date: 9/11/23    Discharge Diagnosis:   1. Adjustment disorder with mixed disturbance of emotions and conduct            Criteria for Discharge: Voluntarily ending services, states he is doing well. Aftercare recommendations include (include specific referral names and phone numbers, if appropriate): Angella Galindo was encouraged to contact the [facility] for a referral for alternative counseling services as needed.      Prognosis: fair Reported by family  Monitor

## 2024-06-14 NOTE — UTILIZATION REVIEW
Initial Clinical Review    Admission: Date/Time/Statement:   Admission Orders (From admission, onward)       Ordered        06/13/24 0743  INPATIENT ADMISSION  Once                          Orders Placed This Encounter   Procedures    INPATIENT ADMISSION     Standing Status:   Standing     Number of Occurrences:   1     Order Specific Question:   Level of Care     Answer:   Level 2 Stepdown / HOT [14]     Order Specific Question:   Estimated length of stay     Answer:   More than 2 Midnights     Order Specific Question:   Certification     Answer:   I certify that inpatient services are medically necessary for this patient for a duration of greater than two midnights. See H&P and MD Progress Notes for additional information about the patient's course of treatment.     ED Arrival Information       Expected   -    Arrival   6/13/2024 04:37    Acuity   Emergent              Means of arrival   Ambulance    Escorted by   [facility]    Service   Hospitalist    Admission type   Emergency              Arrival complaint   Altered Mental Status             Chief Complaint   Patient presents with    Altered Mental Status     Pt sent via EMS from nursing facility w/ AMS. Pt lethargic during triage stating he is confused and having CP. Pt recently admitted to hospital w/ COPD exac.          Initial Presentation: 58 y.o. male with PMHx: COPD, chronic cocaine use, alcohol abuse, history of stroke, T2DM, recent rib fracture after fall who presented on 6/13 to [facility] ED via EMS due to shortness of breath.  Patient presented earlier this morning in the ED and admitted for COPD exacerbation.  Patient was discharged as AMA as patient reported that he was frustrated and wanted to leave the hospital.  On initial arrival to the ED earlier this morning, patient was briefly on BiPAP.  Patient symptoms improved and used transition off BiPAP.  He felt like his breathing was improving and therefore left however once  "discharge he returned within 30 minutes due to feeling worse again and his wife telling him to go back to the hospital.  Vital signs significant for tachypnea, tachycardia and complaining of right chest pain from known rib fracture.  Also complaining of anxiety from alcohol.  Last alcohol use on 6/7.  Of note, patient was previously undergoing rehab at [facility] for alcohol abuse.  Patient is currently willing to stay in the hospital for further treatment.  Patient received breathing treatments in the ED and Valium for withdrawal symptoms.  Patient will be admitted for COPD exacerbation for IV steroids and IV antibiotic use.   Plan:  Admit Inpatient status to Level 2 stepdown unit dt COPD exacerbation: IV steroid, IV doxycycline, Pulmonology consult, fu on blood, sputum and urine cxs, BiPAP prn, start CIWA and valium prn, pain control prn and encourage inc spirometry. Start accuchecks w/ SSI, monitor BP and continue home meds. PT OT eval.       Anticipated Length of Stay/Certification Statement: Patient will be admitted on an inpatient basis with an anticipated length of stay of greater than 2 midnights secondary to COPD exacerbation requiring IV steroids and IV antibiotics.     6/13 Per Pulmonology: Acute hypoxemic/hypercapnic resp failure, COPD with possible mild exacerbation, Pulmonary edema:  5 days prednisone 40 mg for possible mild COPD exacerbation with doxycycline x 5 days. Xopenex/Atrovent TID for now. Recommend a dose of lasix- pulmonary edema apparent on xray.     6/13 Update: He was readmitted earlier today. Patient has known history of substance use and came in from rehab to the hospital for hypercapnic respiratory failure. Patient was placed on BiPAP with subsequent improvement of symptoms. He appears to be baseline in his cognitive status. He is interactive. He is appears quite frustrated and upset about staying in the hospital. He reports that he \"wants to get back to rehab\". He is leaving " AGAINST MEDICAL ADVICE. Patient eloped before Dr could talk with him again.       ED Triage Vitals [06/13/24 0447]   Temperature Pulse Respirations Blood Pressure SpO2 Pain Score   97.9 °F (36.6 °C) 79 18 150/96 94 % 8     Weight (last 2 days) before discharge       None            Vital Signs (last 3 days) before discharge       Date/Time Temp Pulse Resp BP MAP (mmHg) SpO2 Calculated FIO2 (%) - Nasal Cannula O2 Flow Rate (L/min) Nasal Cannula O2 Flow Rate (L/min) O2 Device O2 Interface Device Patient Position - Orthostatic VS [place] Coma Scale Score CIWA-Ar Total Pain    06/13/24 1115 -- 77 20 177/92 126 90 % -- -- -- None (Room air) -- -- -- -- --    06/13/24 1108 -- 71 -- 177/92 -- -- -- -- -- -- -- -- -- 9 --    06/13/24 0925 -- -- -- -- -- 97 % -- -- -- -- Face mask -- -- -- --    06/13/24 0800 -- 75 20 125/80 -- 99 % -- -- -- BiPAP -- Lying 15 2 --    06/13/24 0645 -- 78 21 -- -- 97 % -- -- -- BiPAP -- -- -- -- --    06/13/24 0600 -- 86 22 -- -- 96 % -- -- -- BiPAP -- -- -- -- --    06/13/24 0550 -- -- -- -- -- 98 % -- -- -- -- Face mask -- -- -- --    06/13/24 0450 -- -- -- -- -- -- -- 2 L/min -- Nasal cannula -- -- -- -- --    06/13/24 0447 97.9 °F (36.6 °C) 79 18 150/96 -- 94 % 28 -- 2 L/min Nasal cannula -- Lying -- -- 8           CIWA-Ar Score       Row Name 06/13/24 1108 06/13/24 0800          CIWA-Ar    /92 --     Pulse 71 --     Nausea and Vomiting 1 0     Tactile Disturbances 1 0     Tremor 0 0     Auditory Disturbances 0 0     Paroxysmal Sweats 0 1     Visual Disturbances 0 0     Anxiety 4 0     Headache, Fullness in Head 3 0     Agitation 0 0     Orientation and Clouding of Sensorium 0 1     CIWA-Ar Total 9 2                     Pertinent Labs/Diagnostic Test Results:   Radiology:  XR chest 1 view portable   ED Interpretation by Kathy Sheth DO (06/13 0656)   Mildly worse perihilar thickening compared to prior CXR. No pneumothorax      Final Interpretation by Lito Quiroz MD (06/13  0947)      Pulmonary vascular congestion.            Workstation performed: [id number]           Cardiology:  ECG 12 lead   Final Result by Evan Xavier MD (06/13 1659)   Normal sinus rhythm   Septal infarct (cited on or before 13-JUN-2024)   Abnormal ECG   When compared with ECG of 13-JUN-2024 04:47, (unconfirmed)   No significant change was found   Confirmed by Evan Xavier ([id number]) on 6/13/2024 4:59:45 PM      ECG 12 lead   Final Result by Evan Xavier MD (06/13 1659)   Normal sinus rhythm   When compared with ECG of 11-JUN-2024 11:23,   No significant change was found   Confirmed by Evan Xavier ([id number]) on 6/13/2024 4:59:41 PM        GI:  No orders to display       Results from last 7 days   Lab Units 06/13/24  1747   SARS-COV-2  Not Detected     Results from last 7 days   Lab Units 06/14/24  0543 06/13/24  0449 06/11/24  1042   WBC Thousand/uL 9.94 4.83 6.01   HEMOGLOBIN g/dL 15.4 14.6 14.5   HEMATOCRIT % 46.0 44.5 43.6   PLATELETS Thousands/uL 277 236 221   TOTAL NEUT ABS Thousands/µL 8.76* 2.45 4.21         Results from last 7 days   Lab Units 06/14/24  0543 06/13/24  0449 06/11/24  0921   SODIUM mmol/L 136 140 141   POTASSIUM mmol/L 4.7 3.8 4.2   CHLORIDE mmol/L 98 101 101   CO2 mmol/L 27 32 33*   ANION GAP mmol/L 11 7 7   BUN mg/dL 19 13 12   CREATININE mg/dL 0.69 0.78 0.73   EGFR ml/min/1.73sq m 103 98 101   CALCIUM mg/dL 9.2 9.1 9.5     Results from last 7 days   Lab Units 06/11/24  1042 06/11/24  0921   AST U/L  --  37   ALT U/L  --  79*   ALK PHOS U/L  --  84   TOTAL PROTEIN g/dL  --  6.7   ALBUMIN g/dL  --  3.9   TOTAL BILIRUBIN mg/dL  --  0.25   BILIRUBIN DIRECT mg/dL  --  0.08   AMMONIA umol/L 30  --      Results from last 7 days   Lab Units 06/13/24  2044 06/13/24  1704 06/13/24  0834 06/12/24  1041 06/11/24  1642 06/11/24  1323 06/11/24  0910   POC GLUCOSE mg/dl 320* 355* 168* 175* 193* 134 213*     Results from last 7 days   Lab Units 06/14/24  0543 06/13/24  0449 06/11/24  0921   GLUCOSE RANDOM  mg/dL 224* 189* 211*         Results from last 7 days   Lab Units 06/13/24  0752 06/11/24  1326   HEMOGLOBIN A1C % 7.0* 7.2*   EAG mg/dl 154 160     Results from last 7 days   Lab Units 06/13/24  1049 06/13/24  0641 06/13/24  0449   PH SERENITY  7.403* 7.323 7.289*   PCO2 SERENITY mm Hg 48.5 61.4* 72.4*   PO2 SERENITY mm Hg 61.2* 57.4* 43.6   HCO3 SERENITY mmol/L 29.6 31.1* 34.0*   BASE EXC SERENITY mmol/L 3.8 3.2 4.4   O2 CONTENT SERENITY ml/dL 19.8 18.1 16.7   O2 HGB, VENOUS % 89.2* 85.5* 73.8     Results from last 7 days   Lab Units 06/13/24  0844 06/13/24  0641 06/13/24  0449 06/11/24  1151 06/11/24  0921   HS TNI 0HR ng/L  --   --  17  --  16   HS TNI 2HR ng/L  --  15  --  16  --    HSTNI D2 ng/L  --  -2  --  0  --    HS TNI 4HR ng/L 13  --   --   --   --    HSTNI D4 ng/L -4  --   --   --   --      Results from last 7 days   Lab Units 06/11/24  0921   TSH 3RD GENERATON uIU/mL 37.500*     Results from last 7 days   Lab Units 06/14/24  0543 06/13/24 0449   PROCALCITONIN ng/ml 0.07 0.11     Results from last 7 days   Lab Units 06/13/24  0449   BNP pg/mL 20     Results from last 7 days   Lab Units 06/13/24  0449   CRP mg/L 11.0*     Results from last 7 days   Lab Units 06/11/24  1243   CLARITY UA  Clear   COLOR UA  Light Yellow   SPEC GRAV UA  >=1.050*   PH UA  6.5   GLUCOSE UA mg/dl Negative   KETONES UA mg/dl Negative   BLOOD UA  Negative   PROTEIN UA mg/dl Trace*   NITRITE UA  Negative   BILIRUBIN UA  Negative   UROBILINOGEN UA (BE) mg/dl <2.0   LEUKOCYTES UA  Negative   WBC UA /hpf 1-2   RBC UA /hpf None Seen   BACTERIA UA /hpf None Seen   EPITHELIAL CELLS WET PREP /hpf Occasional   MUCUS THREADS  Occasional*     Results from last 7 days   Lab Units 06/13/24  1747 06/13/24  1447   STREP PNEUMONIAE ANTIGEN, URINE   --  Negative   INFLUENZA B  Not Detected  --    RESPIRATORY SYNCYTIAL VIRUS  Not Detected  --      Results from last 7 days   Lab Units 06/13/24  1747   ADENOVIRUS  Not Detected   BORDETELLA PARAPERTUSSIS  Not Detected    BORDETELLA PERTUSSIS  Not Detected   CHLAMYDIA PNEUMONIAE  Not Detected   CORONAVIRUS 229E  Not Detected   CORONAVIRUS HKU1  Not Detected   CORONAVIRUS NL63  Not Detected   CORONAVIRUS OC43  Not Detected   METAPNEUMOVIRUS  Not Detected   RHINOVIRUS  Not Detected   MYCOPLASMA PNEUMONIAE  Not Detected   PARAINFLUENZA 1  Not Detected   PARAINFLUENZA 2  Not Detected   PARAINFLUENZA 3  Not Detected   PARAINFLUENZA 4  Not Detected     Results from last 7 days   Lab Units 06/11/24  1243   AMPH/METH  Negative   BARBITURATE UR  Negative   BENZODIAZEPINE UR  Positive*   COCAINE UR  Negative   METHADONE URINE  Negative   OPIATE UR  Negative   PCP UR  Negative   THC UR  Negative     Results from last 7 days   Lab Units 06/11/24  0941   ETHANOL LVL mg/dL <10   ACETAMINOPHEN LVL ug/mL <2*   SALICYLATE LVL mg/dL <5     Results from last 7 days   Lab Units 06/13/24  1443   BLOOD CULTURE  Received in Microbiology Lab. Culture in Progress.  Received in Microbiology Lab. Culture in Progress.     ED Treatment-Medication Administration from 06/13/2024 0437 to 06/14/2024 0811         Date/Time Order Dose Route Action     06/13/2024 0450 albuterol inhalation solution 10 mg 10 mg Nebulization Given     06/13/2024 0450 ipratropium (ATROVENT) 0.02 % inhalation solution 1 mg 1 mg Nebulization Given     06/13/2024 0451 sodium chloride 0.9 % inhalation solution 12 mL 12 mL Nebulization Given     06/13/2024 0837 heparin (porcine) subcutaneous injection 5,000 Units 5,000 Units Subcutaneous Given     06/13/2024 0835 insulin lispro (HumALOG/ADMELOG) 100 units/mL subcutaneous injection 1-5 Units 1 Units Subcutaneous Given     06/13/2024 0925 levalbuterol (XOPENEX) inhalation solution 1.25 mg 1.25 mg Nebulization Given     06/13/2024 0925 ipratropium (ATROVENT) 0.02 % inhalation solution 0.5 mg 0.5 mg Nebulization Given     06/13/2024 0834 methylPREDNISolone sodium succinate (Solu-MEDROL) injection 40 mg 40 mg Intravenous Given     06/13/2024 0835  lidocaine (LIDODERM) 5 % patch 1 patch 1 patch Topical Medication Applied     06/13/2024 1115 LORazepam (ATIVAN) injection 2 mg 2 mg Intravenous Given            Past Medical History:   Diagnosis Date    COPD (chronic obstructive pulmonary disease) (HCC)     Diabetes mellitus (HCC)     Hypertension     Stroke (HCC)     TIA (transient ischemic attack)      Present on Admission:   Acute respiratory failure with hypoxia and hypercapnia (HCC)   Encephalopathy   Type 2 diabetes mellitus without complication, without long-term current use of insulin (HCC)   Alcohol abuse      Admitting Diagnosis: Altered mental status [R41.82]  Age/Sex: 58 y.o. male  Admission Orders:  Medications 06/11 06/12 06/13   acetaminophen (TYLENOL) tablet 650 mg  Dose: 650 mg  Freq: Every 8 hours scheduled Route: PO  Start: 06/13/24 1430 End: 06/13/24 1419      1419-D/C'd      acetaminophen (TYLENOL) tablet 650 mg  Dose: 650 mg  Freq: Every 8 hours scheduled Route: PO  Start: 06/13/24 1400 End: 06/13/24 1337      1337-D/C'd      acetaminophen (TYLENOL) tablet 975 mg  Dose: 975 mg  Freq: Every 8 hours scheduled Route: PO  Start: 06/13/24 1430      1442     2103        acetaminophen (TYLENOL) tablet 975 mg  Dose: 975 mg  Freq: Every 8 hours scheduled Route: PO  Start: 06/11/24 1400 End: 06/12/24 1818    1429     2158      (8861)     1335     1818-D/C'd       albuterol inhalation solution 10 mg  Dose: 10 mg  Freq: Once Route: NEBULIZATION  Indications of Use: BRONCHOSPASM  Start: 06/13/24 1400 End: 06/13/24 1436   Admin Instructions:         1436        albuterol inhalation solution 10 mg  Dose: 10 mg  Freq: Once Route: NEBULIZATION  Indications of Use: BRONCHOSPASM  Start: 06/13/24 0500 End: 06/13/24 0450   Admin Instructions:         0450        albuterol inhalation solution 5 mg  Dose: 5 mg  Freq: Once Route: NEBULIZATION  Start: 06/11/24 0930 End: 06/11/24 1012    1012          aspirin chewable tablet 81 mg  Dose: 81 mg  Freq: Daily Route:  PO  Start: 06/14/24 0900         aspirin chewable tablet 81 mg  Dose: 81 mg  Freq: Daily Route: PO  Start: 06/13/24 0900 End: 06/13/24 1337      (0800)     1337-D/C'd      aspirin chewable tablet 81 mg  Dose: 81 mg  Freq: Daily Route: PO  Start: 06/12/24 0900 End: 06/12/24 1818     0935     1818-D/C'd       aspirin chewable tablet 81 mg  Dose: 81 mg  Freq: Daily Route: PO  Start: 06/11/24 1230 End: 06/11/24 1229    1229-D/C'd        atorvastatin (LIPITOR) tablet 40 mg  Dose: 40 mg  Freq: Daily with dinner Route: PO  Start: 06/13/24 1630      1704        atorvastatin (LIPITOR) tablet 40 mg  Dose: 40 mg  Freq: Daily with dinner Route: PO  Start: 06/13/24 1630 End: 06/13/24 1337      1337-D/C'd      atorvastatin (LIPITOR) tablet 40 mg  Dose: 40 mg  Freq: Daily with dinner Route: PO  Start: 06/11/24 1630 End: 06/12/24 1818    1603      1602     1818-D/C'd       atorvastatin (LIPITOR) tablet 40 mg  Dose: 40 mg  Freq: Daily Route: PO  Start: 06/11/24 1230 End: 06/11/24 1229    1229-D/C'd        ceftriaxone (ROCEPHIN) 1 g/50 mL in dextrose IVPB  Dose: 1,000 mg  Freq: Every 24 hours Route: IV  Start: 06/13/24 1315 End: 06/13/24 1312   Admin Instructions:      Order specific questions:         1312-D/C'd      cloNIDine (CATAPRES) tablet 0.1 mg  Dose: 0.1 mg  Freq: Once Route: PO  Start: 06/12/24 1230 End: 06/12/24 1224   Admin Instructions:      Order specific questions:        1224         clopidogrel (PLAVIX) tablet 75 mg  Dose: 75 mg  Freq: Daily Route: PO  Start: 06/14/24 0900   Admin Instructions:            clopidogrel (PLAVIX) tablet 75 mg  Dose: 75 mg  Freq: Daily Route: PO  Start: 06/13/24 0900 End: 06/13/24 1337   Admin Instructions:         (0800     1337-D/C'd      clopidogrel (PLAVIX) tablet 75 mg  Dose: 75 mg  Freq: Daily Route: PO  Start: 06/12/24 0900 End: 06/12/24 1818   Admin Instructions:        0935     1818-D/C'd       clopidogrel (PLAVIX) tablet 75 mg  Dose: 75 mg  Freq: Daily Route: PO  Start:  06/11/24 1230 End: 06/11/24 1229   Admin Instructions:       1229-D/C'd        diazepam (VALIUM) injection 10 mg  Dose: 10 mg  Freq: Once Route: IV  Start: 06/13/24 1400 End: 06/13/24 1412   Admin Instructions:         1412-D/C'd          diazepam (VALIUM) tablet 5 mg  Dose: 5 mg  Freq: Once Route: PO  Start: 06/13/24 1745 End: 06/13/24 1745   Admin Instructions:         1745        doxazosin (CARDURA) tablet 8 mg  Dose: 8 mg  Freq: Daily at bedtime Route: PO  Start: 06/13/24 2200   Order specific questions:         2103        doxazosin (CARDURA) tablet 8 mg  Dose: 8 mg  Freq: Daily at bedtime Route: PO  Start: 06/13/24 2200 End: 06/13/24 0803   Order specific questions:         0803-D/C'd      doxazosin (CARDURA) tablet 8 mg  Dose: 8 mg  Freq: Daily at bedtime Route: PO  Start: 06/11/24 2200 End: 06/12/24 1818   Order specific questions:       2157      1818-D/C'd       doxycycline (VIBRAMYCIN) 100 mg in sodium chloride 0.9 % 100 mL IVPB  Dose: 100 mg  Freq: Every 12 hours Route: IV  Last Dose: 100 mg (06/14/24 0259)  Start: 06/13/24 1430 End: 06/18/24 1429   Admin Instructions:      Order specific questions:         1447        doxycycline (VIBRAMYCIN) 100 mg in sodium chloride 0.9 % 100 mL IVPB  Dose: 100 mg  Freq: Every 12 hours Route: IV  Start: 06/13/24 1400 End: 06/13/24 1337   Admin Instructions:      Order specific questions:         1337-D/C'd      doxycycline (VIBRAMYCIN) 100 mg in sodium chloride 0.9 % 100 mL IVPB  Dose: 100 mg  Freq: Every 12 hours Route: IV  Start: 06/13/24 1400 End: 06/13/24 1312   Admin Instructions:      Order specific questions:         1312-D/C'd      enoxaparin (LOVENOX) subcutaneous injection 40 mg  Dose: 40 mg  Freq: Daily Route: SC  Start: 06/11/24 1230 End: 06/12/24 1818   Admin Instructions:       1326      (0926) 2126-D/C'd       finasteride (PROSCAR) tablet 5 mg  Dose: 5 mg  Freq: Daily Route: PO  Start: 06/12/24 0900 End: 06/12/24 1818   Admin Instructions:         0935     1818-D/C'd       folic acid (FOLVITE) tablet 1 mg  Dose: 1 mg  Freq: Daily Route: PO  Start: 06/14/24 0900         folic acid (FOLVITE) tablet 1 mg  Dose: 1 mg  Freq: Daily Route: PO  Start: 06/13/24 0900 End: 06/13/24 1337      (0809)     1337-D/C'd      furosemide (LASIX) injection 20 mg  Dose: 20 mg  Freq: Once Route: IV  Start: 06/13/24 1545 End: 06/13/24 1705   Admin Instructions:      Order specific questions:         1705        gabapentin (NEURONTIN) capsule 100 mg  Dose: 100 mg  Freq: 3 times daily Route: PO  Start: 06/11/24 1600 End: 06/12/24 1818   Admin Instructions:       1603     2158      0935     1600     1818-D/C'd       heparin (porcine) subcutaneous injection 5,000 Units  Dose: 5,000 Units  Freq: Every 8 hours scheduled Route: SC  Start: 06/13/24 1430   Admin Instructions:         1441     2104        heparin (porcine) subcutaneous injection 5,000 Units  Dose: 5,000 Units  Freq: Every 8 hours scheduled Route: SC  Start: 06/13/24 0800 End: 06/13/24 1337   Admin Instructions:         0837     1337-D/C'd      insulin lispro (HumALOG/ADMELOG) 100 units/mL subcutaneous injection 1-5 Units  Dose: 1-5 Units  Freq: Daily at bedtime Route: SC  Start: 06/13/24 2200 End: 06/14/24 0738   Admin Instructions:         2109         insulin lispro (HumALOG/ADMELOG) 100 units/mL subcutaneous injection 1-5 Units  Dose: 1-5 Units  Freq: 3 times daily before meals Route: SC  Start: 06/13/24 1600 End: 06/14/24 0738   Admin Instructions:          1712         insulin lispro (HumALOG/ADMELOG) 100 units/mL subcutaneous injection 1-5 Units  Dose: 1-5 Units  Freq: Every 6 hours scheduled Route: SC  Start: 06/13/24 1800 End: 06/13/24 1431   Admin Instructions:          1431-D/C'd       insulin lispro (HumALOG/ADMELOG) 100 units/mL subcutaneous injection 1-5 Units  Dose: 1-5 Units  Freq: Every 6 hours scheduled Route: SC  Start: 06/13/24 0800 End: 06/13/24 1337   Admin Instructions:          0835 [C]     (2144)      1337-D/C'd       insulin lispro (HumALOG/ADMELOG) 100 units/mL subcutaneous injection 1-5 Units  Dose: 1-5 Units  Freq: 3 times daily before meals Route: SC  Start: 06/11/24 1245 End: 06/12/24 1818   Admin Instructions:        (5310)     (6775) [C]      (2145) [C]     1129     1600     1818-D/C'd       insulin lispro (HumALOG/ADMELOG) 100 units/mL subcutaneous injection 1-6 Units  Dose: 1-6 Units  Freq: Daily at bedtime Route: SC  Start: 06/14/24 2200   Admin Instructions:            insulin lispro (HumALOG/ADMELOG) 100 units/mL subcutaneous injection 2 Units  Dose: 2 Units  Freq: 3 times daily with meals Route: SC  Start: 06/14/24 0745   Admin Instructions:             insulin lispro (HumALOG/ADMELOG) 100 units/mL subcutaneous injection 2-12 Units  Dose: 2-12 Units  Freq: 3 times daily before meals Route: SC  Start: 06/14/24 0745   Admin Instructions:             ipratropium (ATROVENT) 0.02 % inhalation solution 0.5 mg  Dose: 0.5 mg  Freq: 3 times daily (RESP) Route: NEBULIZATION  Start: 06/13/24 1430 2023        ipratropium (ATROVENT) 0.02 % inhalation solution 0.5 mg  Dose: 0.5 mg  Freq: 3 times daily (RESP) Route: NEBULIZATION  Start: 06/13/24 0800 End: 06/13/24 1337      0925     1337-D/C'd      ipratropium (ATROVENT) 0.02 % inhalation solution 0.5 mg  Dose: 0.5 mg  Freq: Once Route: NEBULIZATION  Start: 06/11/24 0930 End: 06/11/24 1012    1012          ipratropium (ATROVENT) 0.02 % inhalation solution 1 mg  Dose: 1 mg  Freq: Once Route: NEBULIZATION  Indications Comment: Respiratory Distress  Start: 06/13/24 1400 End: 06/13/24 1436   Admin Instructions:         1436        ipratropium (ATROVENT) 0.02 % inhalation solution 1 mg  Dose: 1 mg  Freq: Once Route: NEBULIZATION  Indications Comment: Respiratory Distress  Start: 06/13/24 0500 End: 06/13/24 0450   Admin Instructions:         0450        ketorolac (TORADOL) injection 15 mg  Dose: 15 mg  Freq: Once Route: IV  Start: 06/13/24 1400 End: 06/13/24  1442   Admin Instructions:         1442        levalbuterol (XOPENEX) inhalation solution 1.25 mg  Dose: 1.25 mg  Freq: 3 times daily (RESP) Route: NEBULIZATION  Start: 06/13/24 1430 2023        levalbuterol (XOPENEX) inhalation solution 1.25 mg  Dose: 1.25 mg  Freq: 3 times daily (RESP) Route: NEBULIZATION  Start: 06/13/24 0800 End: 06/13/24 1337      0925     1337-D/C'd      lidocaine (LIDODERM) 5 % patch 1 patch  Dose: 1 patch  Freq: Daily Route: TP  Start: 06/14/24 0900   Admin Instructions:      Order specific questions:            lidocaine (LIDODERM) 5 % patch 1 patch  Dose: 1 patch  Freq: Daily Route: TP  Start: 06/13/24 0815 End: 06/13/24 1337   Admin Instructions:      Order specific questions:         0835     1334 [C]     1337-D/C'd      lidocaine (LIDODERM) 5 % patch 1 patch  Dose: 1 patch  Freq: Daily Route: TP  Start: 06/11/24 1345 End: 06/12/24 1818   Admin Instructions:      Order specific questions:       1427      0227 [C]     0935     1617 [C]     1818-D/C'd       lisinopril (ZESTRIL) tablet 40 mg  Dose: 40 mg  Freq: Daily Route: PO  Start: 06/13/24 1445   Admin Instructions:      Order specific questions:         1705        lisinopril (ZESTRIL) tablet 40 mg  Dose: 40 mg  Freq: Daily Route: PO  Start: 06/12/24 0900 End: 06/12/24 1818   Admin Instructions:      Order specific questions:        0935     1818-D/C'd       LORazepam (ATIVAN) injection 1 mg  Dose: 1 mg  Freq: Once Route: IV  Start: 06/13/24 1415 End: 06/13/24 1441   Admin Instructions:         1441        LORazepam (ATIVAN) injection 2 mg  Dose: 2 mg  Freq: Once Route: IV  Indications of Use: AGITATION,ALCOHOL WITHDRAWAL SYNDROME  Indications Comment: CIWA score of 9  Start: 06/13/24 1115 End: 06/13/24 1115   Admin Instructions:         1115        LORazepam (ATIVAN) tablet 1 mg  Dose: 1 mg  Freq: Once Route: PO  Start: 06/12/24 1100 End: 06/12/24 1128   Admin Instructions:        1128         methylPREDNISolone sodium  succinate (Solu-MEDROL) injection 40 mg  Dose: 40 mg  Freq: Every 12 hours scheduled Route: IV  Start: 06/13/24 2100      2102        methylPREDNISolone sodium succinate (Solu-MEDROL) injection 40 mg  Dose: 40 mg  Freq: Every 12 hours scheduled Route: IV  Start: 06/13/24 0900 End: 06/13/24 1337      0834     1337-D/C'd      mirtazapine (REMERON SOL-TAB) dispersible tablet 15 mg  Dose: 15 mg  Freq: Daily at bedtime Route: PO  Start: 06/11/24 2200 End: 06/12/24 1818   Admin Instructions:       2158      1818-D/C'd       multivitamin-minerals (CENTRUM) tablet 1 tablet  Dose: 1 tablet  Freq: Daily Route: PO  Start: 06/14/24 0900   Admin Instructions:            multivitamin-minerals (CENTRUM) tablet 1 tablet  Dose: 1 tablet  Freq: Daily Route: PO  Start: 06/13/24 0900 End: 06/13/24 1337   Admin Instructions:         (0630) 1521-D/C'd      naloxone (NARCAN) 0.04 mg/mL syringe 0.04 mg  Dose: 0.04 mg  Freq: Once Route: IV  Start: 06/11/24 0930 End: 06/11/24 0918   Admin Instructions:       0918          nicotine (NICODERM CQ) 21 mg/24 hr TD 24 hr patch 1 patch  Dose: 1 patch  Freq: Daily Route: TD  Start: 06/14/24 0900   Admin Instructions:            nicotine (NICODERM CQ) 21 mg/24 hr TD 24 hr patch 1 patch  Dose: 1 patch  Freq: Daily Route: TD  Start: 06/13/24 0900 End: 06/13/24 1337   Admin Instructions:         (2393) 7775-D/C'd      QUEtiapine (SEROquel) tablet 50 mg  Dose: 50 mg  Freq: Daily at bedtime Route: PO  Start: 06/11/24 2200 End: 06/12/24 1818   Admin Instructions:       2158      1818-D/C'd       sodium chloride 0.9 % inhalation solution 12 mL  Dose: 12 mL  Freq: Once Route: NEBULIZATION  Indications of Use: RESPIRATORY DISTRESS  Start: 06/13/24 1400 End: 06/13/24 1436   Admin Instructions:         1436        sodium chloride 0.9 % inhalation solution 12 mL  Dose: 12 mL  Freq: Once Route: NEBULIZATION  Indications of Use: RESPIRATORY DISTRESS  Start: 06/13/24 0500 End: 06/13/24 0451   Admin  Instructions:         0451        thiamine tablet 100 mg  Dose: 100 mg  Freq: Daily Route: PO  Start: 06/14/24 0900         thiamine tablet 100 mg  Dose: 100 mg  Freq: Daily Route: PO  Start: 06/13/24 0900 End: 06/13/24 1337      (8780)     1337-D/C'd                  Continuous Meds none    PRN Meds Sorted by Name  for Kyler Almaguer as of 06/04/24 through 6/13/24  Legend:         Medications 06/11 06/12 06/13   acetaminophen (TYLENOL) tablet 650 mg  Dose: 650 mg  Freq: Every 6 hours PRN Route: PO  PRN Reasons: mild pain,moderate pain,headaches,fever  Start: 06/13/24 1419 End: 06/13/24 1420      1420-D/C'd      acetaminophen (TYLENOL) tablet 650 mg  Dose: 650 mg  Freq: Every 6 hours PRN Route: PO  PRN Reasons: mild pain,moderate pain,headaches,fever  Start: 06/13/24 0746 End: 06/13/24 1337      1337-D/C'd      albuterol (PROVENTIL HFA,VENTOLIN HFA) inhaler 2 puff  Dose: 2 puff  Freq: Every 4 hours PRN Route: IN  PRN Reason: wheezing  Start: 06/13/24 0602 End: 06/13/24 0748   Admin Instructions:         0748-D/C'd      albuterol (PROVENTIL HFA,VENTOLIN HFA) inhaler 2 puff  Dose: 2 puff  Freq: Every 6 hours PRN Route: IN  PRN Reason: wheezing  Start: 06/11/24 1229 End: 06/12/24 1818   Admin Instructions:        1818-D/C'd       albuterol inhalation solution 2.5 mg  Dose: 2.5 mg  Freq: Every 4 hours PRN Route: NEBULIZATION  PRN Reason: wheezing  Start: 06/14/24 0718         diazepam (VALIUM) tablet 10 mg  Dose: 10 mg  Freq: Every 6 hours PRN Route: PO  PRN Reason: anxiety  Start: 06/13/24 1727   Admin Instructions:         2102        diazepam (VALIUM) tablet 5 mg  Dose: 5 mg  Freq: Every 6 hours PRN Route: PO  PRN Reason: anxiety  Start: 06/13/24 1419 End: 06/13/24 1727   Admin Instructions:         1712     1727-D/C'd      diazepam (VALIUM) tablet 5 mg  Dose: 5 mg  Freq: Every 6 hours PRN Route: PO  PRN Reason: anxiety  Start: 06/13/24 1311 End: 06/13/24 1337   Admin Instructions:         1337-D/C'd      diazepam  (VALIUM) tablet 5 mg  Dose: 5 mg  Freq: Every 6 hours PRN Route: PO  PRN Reason: anxiety  Start: 06/11/24 1344 End: 06/12/24 1818   Admin Instructions:       1429     2158      0613     1210     1818-D/C'd       iohexol (OMNIPAQUE) 350 MG/ML injection (MULTI-DOSE) 85 mL  Dose: 85 mL  Freq: Once in imaging Route: IV  PRN Reason: contrast  Start: 06/11/24 0937 End: 06/11/24 0937    0937          labetalol (NORMODYNE) injection 10 mg  Dose: 10 mg  Freq: Every 6 hours PRN Route: IV  PRN Reason: high blood pressure  PRN Comment: SBP >180  Start: 06/11/24 1806 End: 06/12/24 1818   Admin Instructions:       1818      1818-D/C'd       oxyCODONE (ROXICODONE) IR tablet 5 mg  Dose: 5 mg  Freq: Every 6 hours PRN Route: PO  PRN Reason: moderate pain  Start: 06/13/24 1758   Admin Instructions:         2104        oxyCODONE (ROXICODONE) IR tablet 5 mg  Dose: 5 mg  Freq: Every 4 hours PRN Route: PO  PRN Reasons: moderate pain,severe pain  Start: 06/11/24 1752 End: 06/12/24 1818   Admin Instructions:       1802     2310      0613     1028     1601     1818-D/C'd       oxyCODONE (ROXICODONE) split tablet 2.5 mg  Dose: 2.5 mg  Freq: Every 6 hours PRN Route: PO  PRN Reason: moderate pain  Start: 06/13/24 1419 End: 06/13/24 1758   Admin Instructions:         1705     1758-D/C'd      oxyCODONE (ROXICODONE) split tablet 2.5 mg  Dose: 2.5 mg  Freq: Every 6 hours PRN Route: PO  PRN Reason: moderate pain  Start: 06/13/24 1311 End: 06/13/24 1337   Admin Instructions:         1337-D/C'd      oxyCODONE (ROXICODONE) split tablet 2.5 mg  Dose: 2.5 mg  Freq: Every 4 hours PRN Route: PO  PRN Reasons: moderate pain,severe pain  Start: 06/11/24 1344 End: 06/11/24 1752   Admin Instructions:       1639     1752-D/C'd              IP CONSULT TO PULMONOLOGY    Network Utilization Review Department  ATTENTION: Please call with any questions or concerns to [phone] and carefully listen to the prompts so that you are directed to the right person. All  voicemails are confidential.   For Discharge needs, contact Care Management DC Support Team at [phone] opt. 2  Send all requests for admission clinical reviews, approved or denied determinations and any other requests to dedicated fax number below belonging to the campus where the patient is receiving treatment. List of dedicated fax numbers for the Facilities:  FACILITY NAME UR FAX NUMBER   ADMISSION DENIALS (Administrative/Medical Necessity) [phone]   DISCHARGE SUPPORT TEAM (NETWORK) [phone]   PARENT CHILD HEALTH (Maternity/NICU/Pediatrics) [phone]   [facility] [phone]   [facility] [phone]   [facility] [phone]   [facility] [phone]   [facility] [phone]   [facility] [phone]   [facility] [phone]   [facility] [phone]   [facility] [phone]   [facility] [phone]   [facility] [phone]   [facility] [phone]

## 2024-06-14 NOTE — PROGRESS NOTES
[facility]  Progress Note  Name: Kyler Almaguer I  MRN: [id number]  Unit/Bed#: -01 I Date of Admission: 6/13/2024   Date of Service: 6/14/2024 I Hospital Day: 1    Assessment & Plan   * Acute respiratory failure with hypoxia and hypercapnia (HCC)  Assessment & Plan  Initially presented to ER from [place] [facility] with increased somnolence and was noted to be in COPD exacerbation.  Patient was started on BiPAP initially and thereafter patient was taken off BiPAP with subsequent improvement in symptoms.  Patient was frustrated and upset about staying in the hospital and therefore left AMA only to return 30 minutes later after he spoke with his wife who told him to come back to the hospital for admission.  Currently saturating well on room air  Does have decreased breath sounds and wheezing on exam concerning for COPD exacerbation - has improved  Blood cultures pending  Strep pneumo urine antigen negative  RP2 negative   BNP 20, CXR with evidence of pulmonary vascular congestion. S/p 1 dose lasix 20mg  Pro-Kannan negative x2, CRP elevated  Continue solumedrol 40mg BID today with plan to transition to oral steroids tomorrow   Pulmonology consulted, appreciate recs - see below   Continue prednisone 40 mg x 5 days - plan to start PO tomorrow   Continue Doxy x 5 days  Neb schedule 3 times daily  Airway clearance  BiPAP as needed  CIWA protocol    Rib fracture  Assessment & Plan  Noted to have nondisplaced right sixth and seventh rib fracture on rib series  Patient reportedly fell and hit the corner of a table a few days ago  Incentive spirometry  Pain control with Tylenol, lidocaine patch and as needed oxycodone  EKG without arrhythmias x24 hours. Will discontinue     Alcohol abuse  Assessment & Plan  Last drink on 6/7  Continue CIWA protocol  Valium as needed    History of crack cocaine use  Assessment & Plan  Reported by family  Monitor     History of stroke  Assessment &  Plan  Continue PTA aspirin, statin and Plavix  Previously admitted with encephalopathy likely TME related to hypercapnia  Mentation currently at baseline and improving    Type 2 diabetes mellitus without complication, without long-term current use of insulin ([facility])  Assessment & Plan  Lab Results   Component Value Date    HGBA1C 7.0 (H) 06/13/2024       Recent Labs     06/13/24  0834 06/13/24  1704 06/13/24  2044 06/14/24  0829   POCGLU 168* 355* 320* 209*       Blood Sugar Average: Last 72 hrs:  (P) 282  Sliding scale algorithm increased   Start humalog 2U TID  Diabetic diet   Continue to adjust as needed    Primary hypertension  Assessment & Plan  Blood pressure elevated in the 190s  Resume home doxazosin, lisinopril  May need additional agent as Bps have remained elevated. Would monitor and encourage pain control/anxiety prior to initiating         VTE Pharmacologic Prophylaxis:   Moderate Risk (Score 3-4) - Pharmacological DVT Prophylaxis Ordered: heparin.    Mobility:   Basic Mobility Inpatient Raw Score: 22  JH-HLM Goal: 7: Walk 25 feet or more  JH-HLM Achieved: 7: Walk 25 feet or more  JH-HLM Goal achieved. Continue to encourage appropriate mobility.    Patient Centered Rounds: I performed bedside rounds with nursing staff today.   Discussions with Specialists or Other Care Team Provider: None    Education and Discussions with Family / Patient: Patient declined call to .     Total Time Spent on Date of Encounter in care of patient: This time was spent on one or more of the following: performing physical exam; counseling and coordination of care; obtaining or reviewing history; documenting in the medical record; reviewing/ordering tests, medications or procedures; communicating with other healthcare professionals and discussing with patient's family/caregivers.    Current Length of Stay: 1 day(s)  Current Patient Status: Inpatient   Certification Statement: The patient will continue to require  additional inpatient hospital stay due to IV steroids, placement  Discharge Plan: Anticipate discharge in 24-48 hrs to rehab facility.    Code Status: Level 3 - DNAR and DNI    Subjective:   Seen and examined. No acute events overnight. States he was kicked out of his last rehab because his pressure was too high. Now requesting [facility] for alcohol rehab. CM made aware.     Objective:     Vitals:   Temp (24hrs), Av.1 °F (36.7 °C), Min:97.6 °F (36.4 °C), Max:98.2 °F (36.8 °C)    Temp:  [97.6 °F (36.4 °C)-98.2 °F (36.8 °C)] 97.6 °F (36.4 °C)  HR:  [] 97  Resp:  [20] 20  BP: (110-194)/() 156/100  SpO2:  [90 %-99 %] 90 %  Body mass index is 36.59 kg/m².     Input and Output Summary (last 24 hours):     Intake/Output Summary (Last 24 hours) at 2024 0949  Last data filed at 2024 1800  Gross per 24 hour   Intake 236 ml   Output 0 ml   Net 236 ml       Physical Exam:   Physical Exam  Constitutional:       Appearance: He is obese.   HENT:      Mouth/Throat:      Mouth: Mucous membranes are moist.   Eyes:      Conjunctiva/sclera: Conjunctivae normal.   Cardiovascular:      Heart sounds: Normal heart sounds.   Pulmonary:      Breath sounds: No wheezing, rhonchi or rales.      Comments: Poor inspiratory effort   Abdominal:      General: There is distension.   Musculoskeletal:      Right lower leg: No edema.      Left lower leg: No edema.   Skin:     General: Skin is warm and dry.   Neurological:      Mental Status: Mental status is at baseline.          Additional Data:     Labs:  Results from last 7 days   Lab Units 24  0543   WBC Thousand/uL 9.94   HEMOGLOBIN g/dL 15.4   HEMATOCRIT % 46.0   PLATELETS Thousands/uL 277   SEGS PCT % 88*   LYMPHO PCT % 8*   MONO PCT % 3*   EOS PCT % 0     Results from last 7 days   Lab Units 24  0543 24  0449 24  0921   SODIUM mmol/L 136   < > 141   POTASSIUM mmol/L 4.7   < > 4.2   CHLORIDE mmol/L 98   < > 101   CO2 mmol/L 27   < >  33*   BUN mg/dL 19   < > 12   CREATININE mg/dL 0.69   < > 0.73   ANION GAP mmol/L 11   < > 7   CALCIUM mg/dL 9.2   < > 9.5   ALBUMIN g/dL  --   --  3.9   TOTAL BILIRUBIN mg/dL  --   --  0.25   ALK PHOS U/L  --   --  84   ALT U/L  --   --  79*   AST U/L  --   --  37   GLUCOSE RANDOM mg/dL 224*   < > 211*    < > = values in this interval not displayed.         Results from last 7 days   Lab Units 06/14/24  0829 06/13/24  2044 06/13/24  1704 06/13/24  0834 06/12/24  1041 06/11/24  1642 06/11/24  1323 06/11/24  0910   POC GLUCOSE mg/dl 209* 320* 355* 168* 175* 193* 134 213*     Results from last 7 days   Lab Units 06/13/24  0752 06/11/24  1326   HEMOGLOBIN A1C % 7.0* 7.2*     Results from last 7 days   Lab Units 06/14/24  0543 06/13/24  0449   PROCALCITONIN ng/ml 0.07 0.11       Lines/Drains:  Invasive Devices       Peripheral Intravenous Line  Duration             Peripheral IV 06/13/24 Right;Ventral (anterior) Hand <1 day                      Telemetry:  Telemetry Orders (From admission, onward)               24 Hour Telemetry Monitoring  Continuous x 24 Hours (Telem)        Question:  Reason for 24 Hour Telemetry  Answer:  Syncope suspected to be cardiac in origin                     Telemetry Reviewed:  No events or arrhythmias noted overnight   Indication for Continued Telemetry Use: No indication for continued use. Will discontinue.              Imaging: Reviewed radiology reports from this admission including: chest xray    Recent Cultures (last 7 days):   Results from last 7 days   Lab Units 06/13/24  1443   BLOOD CULTURE  Received in Microbiology Lab. Culture in Progress.  Received in Microbiology Lab. Culture in Progress.       Last 24 Hours Medication List:   Current Facility-Administered Medications   Medication Dose Route Frequency Provider Last Rate    acetaminophen  975 mg Oral Q8H FLORESITA Kolb DO      albuterol  2.5 mg Nebulization Q4H PRN Nadine Reyez MD      aspirin  81 mg Oral Daily  Harbhavnakumar Kolb, DO      atorvastatin  40 mg Oral Daily [facility]      clopidogrel  75 mg [facility]      diazepam  10 mg Oral Q6H PRN [facility]      doxazosin  8 mg Oral HS [facility]      doxycycline  100 mg Intravenous Q12H [facility],  mg (06/14/24 0259)    folic acid  1 mg Oral Daily [facility], [facility]      heparin (porcine)  5,000 Units Subcutaneous Q8H [facility]      insulin lispro  1-6 Units Subcutaneous HS Kathy Downey PA-C      insulin lispro  2 Units Subcutaneous TID With Meals Kathy Downey PA-C      insulin lispro  2-12 Units Subcutaneous TID  Kathy Downey PA-C      ipratropium  0.5 mg Nebulization TID [facility], DO      levalbuterol  1.25 mg Nebulization TID [facility], DO      lidocaine  1 patch Topical Daily [facility]      lisinopril  40 mg Oral Daily [facility]      methylPREDNISolone sodium succinate  40 mg Intravenous Q12H [facility] Kathy Downey PA-C      multivitamin-minerals  1 tablet Oral Daily [facility], [facility]      nicotine  1 patch Transdermal Daily [facility], [facility]      oxyCODONE  5 mg Oral Q6H PRN [facility], [facility]      [START ON 6/15/2024] predniSONE  40 mg Oral Daily Kathy Downey PA-C      Thiamine Mononitrate  100 mg Oral Daily [facility], DO          Today, Patient Was Seen By: Kathy Downey PA-C    **Please Note: This note may have been constructed using a voice recognition system.**

## 2024-06-14 NOTE — UTILIZATION REVIEW
"Initial Clinical Review    Admission: Date/Time/Statement:   Admission Orders (From admission, onward)       Ordered        06/13/24 1412  INPATIENT ADMISSION  Once                          Orders Placed This Encounter   Procedures    INPATIENT ADMISSION     Standing Status:   Standing     Number of Occurrences:   1     Order Specific Question:   Level of Care     Answer:   Med Surg [16]     Order Specific Question:   Estimated length of stay     Answer:   More than 2 Midnights     Order Specific Question:   Certification     Answer:   I certify that inpatient services are medically necessary for this patient for a duration of greater than two midnights. See H&P and MD Progress Notes for additional information about the patient's course of treatment.     ED Arrival Information       Expected   -    Arrival   6/13/2024 13:36    Acuity   Emergent              Means of arrival   Walk-In    Escorted by   Self    Service   Hospitalist    Admission type   Emergency              Arrival complaint   -             Chief Complaint   Patient presents with    Shortness of Breath     [place], \"I can't breathe, I was just in the back the doctor didn't come to see me\"       Initial Presentation: 58 y.o. male  THIS REVIEW IS FOR SECOND VISIT TO ED, 6/13/24 TO PRESENT.   FIRST VISIT 6/13 AND LEFT AMA.  Pt with a PMH of COPD, chronic cocaine use, alcohol abuse, history of stroke, type 2 diabetes mellitus, recent rib fracture after fall who presents with shortness of breath.  Patient presented earlier this morning in the ED and admitted for COPD exacerbation.  Patient was discharged as AMA as patient reported that he was frustrated and wanted to leave the hospital.  On initial arrival to the ED earlier this morning, patient was briefly on BiPAP.  Patient symptoms improved and used transition off BiPAP.  He felt like his breathing was improving and therefore left however once discharge he returned within 30 " minutes due to feeling worse again and his wife telling him to go back to the hospital.  In the ED, tachypneic and tachycardia and complaining of right chest pain from known rib fracture.  Also complaining of anxiety from alcohol.  Last alcohol use on 6/7.  Of note, patient was previously undergoing rehab at [facility] for alcohol abuse.  Patient is currently willing to stay in the hospital for further treatment.  Patient received breathing treatments in the ED and Valium for withdrawal symptoms. Admit Inpatient to med surg due to  COPD exacerbation: Pulmonology consult, BiPAP prn, IV steroid, IV doxycycline, fu on blood, sputum and urine cxs, monitor VS and labs, CIWA and valium prn, start accuchecks w/ SSI, encourage inc spirometry. PT OT SIN white consult for dispo planning.     Anticipated Length of Stay/Certification Statement: Patient will be admitted on an inpatient basis with an anticipated length of stay of greater than 2 midnights secondary to COPD exacerbation requiring IV steroids and IV antibiotics.     6/13 Per Pulmonology: Acute hypoxemic/hypercapnic resp failure, COPD with possible mild exacerbation, Pulmonary edema:  5 days prednisone 40 mg for possible mild COPD exacerbation with doxycycline x 5 days. Xopenex/Atrovent TID for now. Recommend a dose of lasix- pulmonary edema apparent on xray.     Date: 6/14   Day 2:  States he was kicked out of his last rehab because his pressure was too high. Now requesting [facility] for alcohol rehab. CM made aware.  Abdominal distention noted. Continue above tx plan including steroids, ABX, CIWA, accucheck, PT OT, CM following.     ED Triage Vitals   Temperature Pulse Respirations Blood Pressure SpO2 Pain Score   06/13/24 1340 06/13/24 1340 06/13/24 1340 06/13/24 1340 06/13/24 1340 06/13/24 1442   98.2 °F (36.8 °C) (!) 111 20 (!) 194/104 93 % 9     Weight (last 2 days)       Date/Time Weight    06/13/24 16:51:26 119 (262.35)    06/13/24 1340 127  (279)            Vital Signs (last 3 days)       Date/Time Temp Pulse Resp BP MAP (mmHg) SpO2 O2 Device Patient Position - Orthostatic VS Alberto Coma Scale Score CIWA-Ar Total Pain    06/14/24 0845 -- -- -- -- -- -- -- -- -- -- 8    06/14/24 07:32:37 97.6 °F (36.4 °C) 97 20 156/100 119 90 % -- -- -- -- --    06/14/24 0708 -- -- -- -- -- 91 % None (Room air) -- -- -- --    06/14/24 0200 -- 91 -- -- -- -- -- -- -- 1 --    06/13/24 2200 -- -- -- -- -- -- -- -- -- 2 --    06/13/24 21:48:50 -- 93 -- 151/101 118 91 % -- -- -- -- --    06/13/24 2100 -- -- -- -- -- -- -- -- -- 6 --    06/13/24 2025 -- -- -- -- -- 99 % -- -- -- -- --    06/13/24 1910 -- -- -- -- -- -- -- -- -- -- 4 06/13/24 18:55:55 -- 107 -- -- -- 91 % -- -- -- -- --    06/13/24 18:55:35 98.2 °F (36.8 °C) 101 -- 149/100 116 91 % -- -- -- -- --    06/13/24 18:01:21 98.2 °F (36.8 °C) 107 -- 140/98 112 92 % -- -- -- 6 --    06/13/24 18:00:38 98.2 °F (36.8 °C) 106 -- 110/77 88 92 % -- -- -- -- --    06/13/24 1705 -- -- -- -- -- -- None (Room air) -- 15 -- 6 06/13/24 1700 -- 104 -- 180/104 -- -- -- -- -- 8 --    06/13/24 16:51:26 98.2 °F (36.8 °C) 106 20 180/104 129 91 % None (Room air) -- -- -- 5    06/13/24 1450 -- -- -- 147/99 -- -- -- -- -- -- --    06/13/24 1449 -- -- -- -- -- -- None (Room air) -- 15 -- --    06/13/24 1448 -- 96 -- -- -- -- -- -- -- 15 --    06/13/24 1442 -- -- -- -- -- -- -- -- -- -- 9    06/13/24 1436 -- -- -- -- -- 94 % -- -- -- -- --    06/13/24 1340 98.2 °F (36.8 °C) 111 20 194/104 -- 93 % None (Room air) Sitting -- -- --           CIWA-Ar Score       Row Name 06/14/24 0200 06/13/24 2200 06/13/24 2100       CIWA-Ar    Pulse 91 -- --    Nausea and Vomiting 0 0 0    Tactile Disturbances 0 0 0    Tremor 1 2 2    Auditory Disturbances 0 0 0    Paroxysmal Sweats 0 0 1    Visual Disturbances 0 0 0    Anxiety 0 0 2    Headache, Fullness in Head 0 0 0    Agitation 0 0 1    Orientation and Clouding of Sensorium 0 0 0    CIWA-Ar Total 1  2 6      Row Name 06/13/24 18:01:21 06/13/24 1700 06/13/24 1448       CIWA-Ar    BP -- 180/104 --    Pulse -- 104 96    Nausea and Vomiting 0 0 2    Tactile Disturbances 0 0 1    Tremor 2 4 4    Auditory Disturbances 0 0 0    Paroxysmal Sweats 0 0 1    Visual Disturbances 0 0 0    Anxiety 3 3 5    Headache, Fullness in Head 0 0 1    Agitation 1 1 1    Orientation and Clouding of Sensorium 0 0 0    CIWA-Ar Total 6 8 15                    Pertinent Labs/Diagnostic Test Results:   Radiology:  No orders to display     Cardiology:  ECG 12 lead   Final Result by Evan Xavier MD (06/13 1659)   Normal sinus rhythm   Incomplete right bundle branch block   Septal infarct (cited on or before 13-JUN-2024)   Abnormal ECG   When compared with ECG of 13-JUN-2024 11:05, (unconfirmed)   Incomplete right bundle branch block is now Present   Confirmed by Evan Xavier ([id number]) on 6/13/2024 4:59:53 PM        GI:  No orders to display       Results from last 7 days   Lab Units 06/13/24  1747   SARS-COV-2  Not Detected     Results from last 7 days   Lab Units 06/14/24  0543 06/13/24  0449 06/11/24  1042   WBC Thousand/uL 9.94 4.83 6.01   HEMOGLOBIN g/dL 15.4 14.6 14.5   HEMATOCRIT % 46.0 44.5 43.6   PLATELETS Thousands/uL 277 236 221   TOTAL NEUT ABS Thousands/µL 8.76* 2.45 4.21         Results from last 7 days   Lab Units 06/14/24  0543 06/13/24  0449 06/11/24  0921   SODIUM mmol/L 136 140 141   POTASSIUM mmol/L 4.7 3.8 4.2   CHLORIDE mmol/L 98 101 101   CO2 mmol/L 27 32 33*   ANION GAP mmol/L 11 7 7   BUN mg/dL 19 13 12   CREATININE mg/dL 0.69 0.78 0.73   EGFR ml/min/1.73sq m 103 98 101   CALCIUM mg/dL 9.2 9.1 9.5     Results from last 7 days   Lab Units 06/11/24  1042 06/11/24  0921   AST U/L  --  37   ALT U/L  --  79*   ALK PHOS U/L  --  84   TOTAL PROTEIN g/dL  --  6.7   ALBUMIN g/dL  --  3.9   TOTAL BILIRUBIN mg/dL  --  0.25   BILIRUBIN DIRECT mg/dL  --  0.08   AMMONIA umol/L 30  --      Results from last 7 days   Lab Units  06/14/24  0829 06/13/24  2044 06/13/24  1704 06/13/24  0834 06/12/24  1041 06/11/24  1642 06/11/24  1323 06/11/24  0910   POC GLUCOSE mg/dl 209* 320* 355* 168* 175* 193* 134 213*     Results from last 7 days   Lab Units 06/14/24  0543 06/13/24  0449 06/11/24  0921   GLUCOSE RANDOM mg/dL 224* 189* 211*         Results from last 7 days   Lab Units 06/13/24  0752 06/11/24  1326   HEMOGLOBIN A1C % 7.0* 7.2*   EAG mg/dl 154 160     Results from last 7 days   Lab Units 06/13/24  1049 06/13/24  0641 06/13/24  0449   PH SERENITY  7.403* 7.323 7.289*   PCO2 SERENITY mm Hg 48.5 61.4* 72.4*   PO2 SERENITY mm Hg 61.2* 57.4* 43.6   HCO3 SERENITY mmol/L 29.6 31.1* 34.0*   BASE EXC SERENITY mmol/L 3.8 3.2 4.4   O2 CONTENT SERENITY ml/dL 19.8 18.1 16.7   O2 HGB, VENOUS % 89.2* 85.5* 73.8     Results from last 7 days   Lab Units 06/13/24  0844 06/13/24  0641 06/13/24  0449 06/11/24  1151 06/11/24  0921   HS TNI 0HR ng/L  --   --  17  --  16   HS TNI 2HR ng/L  --  15  --  16  --    HSTNI D2 ng/L  --  -2  --  0  --    HS TNI 4HR ng/L 13  --   --   --   --    HSTNI D4 ng/L -4  --   --   --   --      Results from last 7 days   Lab Units 06/11/24  0921   TSH 3RD GENERATON uIU/mL 37.500*     Results from last 7 days   Lab Units 06/14/24  0543 06/13/24  0449   PROCALCITONIN ng/ml 0.07 0.11     Results from last 7 days   Lab Units 06/13/24  0449   BNP pg/mL 20     Results from last 7 days   Lab Units 06/13/24  0449   CRP mg/L 11.0*     Results from last 7 days   Lab Units 06/11/24  1243   CLARITY UA  Clear   COLOR UA  Light Yellow   SPEC GRAV UA  >=1.050*   PH UA  6.5   GLUCOSE UA mg/dl Negative   KETONES UA mg/dl Negative   BLOOD UA  Negative   PROTEIN UA mg/dl Trace*   NITRITE UA  Negative   BILIRUBIN UA  Negative   UROBILINOGEN UA (BE) mg/dl <2.0   LEUKOCYTES UA  Negative   WBC UA /hpf 1-2   RBC UA /hpf None Seen   BACTERIA UA /hpf None Seen   EPITHELIAL CELLS WET PREP /hpf Occasional   MUCUS THREADS  Occasional*     Results from last 7 days   Lab Units  06/13/24  1747 06/13/24  1447   STREP PNEUMONIAE ANTIGEN, URINE   --  Negative   INFLUENZA B  Not Detected  --    RESPIRATORY SYNCYTIAL VIRUS  Not Detected  --      Results from last 7 days   Lab Units 06/13/24  1747   ADENOVIRUS  Not Detected   BORDETELLA PARAPERTUSSIS  Not Detected   BORDETELLA PERTUSSIS  Not Detected   CHLAMYDIA PNEUMONIAE  Not Detected   CORONAVIRUS 229E  Not Detected   CORONAVIRUS HKU1  Not Detected   CORONAVIRUS NL63  Not Detected   CORONAVIRUS OC43  Not Detected   METAPNEUMOVIRUS  Not Detected   RHINOVIRUS  Not Detected   MYCOPLASMA PNEUMONIAE  Not Detected   PARAINFLUENZA 1  Not Detected   PARAINFLUENZA 2  Not Detected   PARAINFLUENZA 3  Not Detected   PARAINFLUENZA 4  Not Detected     Results from last 7 days   Lab Units 06/11/24  1243   AMPH/METH  Negative   BARBITURATE UR  Negative   BENZODIAZEPINE UR  Positive*   COCAINE UR  Negative   METHADONE URINE  Negative   OPIATE UR  Negative   PCP UR  Negative   THC UR  Negative     Results from last 7 days   Lab Units 06/11/24  0941   ETHANOL LVL mg/dL <10   ACETAMINOPHEN LVL ug/mL <2*   SALICYLATE LVL mg/dL <5     Results from last 7 days   Lab Units 06/13/24  1443   BLOOD CULTURE  Received in Microbiology Lab. Culture in Progress.  Received in Microbiology Lab. Culture in Progress.     ED Treatment-Medication Administration from 06/13/2024 1336 to 06/13/2024 1644         Date/Time Order Dose Route Action     06/13/2024 1436 albuterol inhalation solution 10 mg 10 mg Nebulization Given     06/13/2024 1436 ipratropium (ATROVENT) 0.02 % inhalation solution 1 mg 1 mg Nebulization Given     06/13/2024 1436 sodium chloride 0.9 % inhalation solution 12 mL 12 mL Nebulization Given     06/13/2024 1442 ketorolac (TORADOL) injection 15 mg 15 mg Intravenous Given     06/13/2024 1441 LORazepam (ATIVAN) injection 1 mg 1 mg Intravenous Given     06/13/2024 1441 heparin (porcine) subcutaneous injection 5,000 Units 5,000 Units Subcutaneous Given     06/13/2024  1447 doxycycline (VIBRAMYCIN) 100 mg in sodium chloride 0.9 % 100 mL IVPB 100 mg Intravenous New Bag     06/13/2024 1442 acetaminophen (TYLENOL) tablet 975 mg 975 mg Oral Given            Past Medical History:   Diagnosis Date    COPD (chronic obstructive pulmonary disease) (HCC)     Diabetes mellitus (HCC)     Hypertension     Stroke (HCC)     TIA (transient ischemic attack)      Present on Admission:   Acute respiratory failure with hypoxia and hypercapnia (HCC)   Type 2 diabetes mellitus without complication, without long-term current use of insulin (HCC)   Rib fracture   History of crack cocaine use   Alcohol abuse   Primary hypertension      Admitting Diagnosis: Alcohol withdrawal ([facility]) [F10.939]  COPD with acute exacerbation ([facility]) [J44.1]  History of encephalopathy [Z86.69]  Age/Sex: 58 y.o. male  Admission Orders:  Scheduled Medications:  acetaminophen, 975 mg, Oral, Q8H FLORESITA  aspirin, 81 mg, Oral, Daily  atorvastatin, 40 mg, Oral, Daily With Dinner  clopidogrel, 75 mg, Oral, Daily  doxazosin, 8 mg, Oral, HS  doxycycline, 100 mg, Intravenous, Q12H  folic acid, 1 mg, Oral, Daily  heparin (porcine), 5,000 Units, Subcutaneous, Q8H FLORESITA  insulin lispro, 1-6 Units, Subcutaneous, HS  insulin lispro, 2 Units, Subcutaneous, TID With Meals  insulin lispro, 2-12 Units, Subcutaneous, TID AC  ipratropium, 0.5 mg, Nebulization, TID  levalbuterol, 1.25 mg, Nebulization, TID  lidocaine, 1 patch, Topical, Daily  lisinopril, 40 mg, Oral, Daily  methylPREDNISolone sodium succinate, 40 mg, Intravenous, Q12H FLORESITA  multivitamin-minerals, 1 tablet, Oral, Daily  nicotine, 1 patch, Transdermal, Daily  Thiamine Mononitrate, 100 mg, Oral, Daily      Continuous IV Infusions: None     PRN Meds:  albuterol, 2.5 mg, Nebulization, Q4H PRN  diazepam, 10 mg, Oral, Q6H PRN x1  diazepam, 5 mg, Oral, Q6H PRN x1 then dc'd   oxyCODONE, 5 mg, Oral, Q6H PRN x1        IP CONSULT TO PULMONOLOGY  IP CONSULT TO CASE MANAGEMENT    Network Utilization  Review Department  ATTENTION: Please call with any questions or concerns to [phone] and carefully listen to the prompts so that you are directed to the right person. All voicemails are confidential.   For Discharge needs, contact Care Management DC Support Team at [phone] opt. [phone]  Send all requests for admission clinical reviews, approved or denied determinations and any other requests to dedicated fax number below belonging to the campus where the patient is receiving treatment. List of dedicated fax numbers for the Facilities:  FACILITY NAME UR FAX NUMBER   ADMISSION DENIALS (Administrative/Medical Necessity) [phone]   DISCHARGE SUPPORT TEAM (NETWORK) [phone]   PARENT CHILD HEALTH (Maternity/NICU/Pediatrics) [phone]   [facility] [phone]   [facility] [phone]   [facility] [phone]   [facility] [phone]   [facility] [phone]   [facility] [phone]   [facility] [phone]   [facility] [phone]   [facility] [phone]   [facility] [phone]   [facility] [phone]   [facility] [phone]

## 2024-06-14 NOTE — CASE MANAGEMENT
Case Management Assessment & Discharge Planning Note    Patient name Kyler Fernandes  Location /-01 MRN [id number]  : 1965 Date 2024       Current Admission Date: 2024  Current Admission Diagnosis:Acute respiratory failure with hypoxia and hypercapnia (HCC)   Patient Active Problem List    Diagnosis Date Noted Date Diagnosed    Acute respiratory failure with hypoxia and hypercapnia (HCC) 2024     Rib fracture 2024     Encephalopathy 2024     Primary hypertension 2024     Type 2 diabetes mellitus without complication, without long-term current use of insulin (HCC) 2024     History of stroke 2024     History of crack cocaine use 2024     Alcohol abuse 2024       LOS (days): 1  Geometric Mean LOS (GMLOS) (days): 3.6  Days to GMLOS:2.7     OBJECTIVE:  PATIENT READMITTED TO HOSPITAL  Risk of Unplanned Readmission Score: 13.97         Current admission status: Inpatient  Referral Reason: Medication Assistance    Preferred Pharmacy:   UNKNOWN - FOLLOW UP PRIOR TO DISCHARGE TO E-PRESCRIBE  No address on file      Primary Care Provider: Maisha Mott MD    Primary Insurance: [employer] REP  Secondary Insurance:     ASSESSMENT:  Active Health Care Proxies    There are no active Health Care Proxies on file.       Advance Directives  Does patient have a Health Care POA?: No  Was patient offered paperwork?: No (Pt declined)  Does patient currently have a Health Care decision maker?: No  Does patient have Advance Directives?: No  Was patient offered paperwork?: No (Pt declined)         Readmission Root Cause  30 Day Readmission: Yes  Who directed you to return to the hospital?: Other (comment) (Rehab facility)  Did you understand whom to contact if you had questions or problems?: Yes  Did you get your prescriptions before you left the hospital?: Yes  Were you able to get your prescriptions filled when you left the hospital?: Yes  Did you take your  medications as prescribed?: Yes  Were you able to get to your follow-up appointments?: No  Reason:: Readmitted prior to appointment  During previous admission, was a post-acute recommendation made?: No  Patient was readmitted due to: Acute respiratory failure  Action Plan: COPD treatment with nebulizer, CIWA protocol    Patient Information  Admitted from:: Facility ([facility])  Mental Status: Alert  During Assessment patient was accompanied by: Not accompanied during assessment  Assessment information provided by:: Patient  Primary Caregiver: Self  Support Systems: Self, Spouse/significant other  County of Residence: Other (specify in comment box) ([place])  What city do you live in?: [place]  Home entry access options. Select all that apply.: Stairs  Number of steps to enter home.: 9  Do the steps have railings?: Yes  Type of Current Residence: Other (Comment) (Double)  Living Arrangements: Lives w/ Spouse/significant other, Lives w/ Son    Activities of Daily Living Prior to Admission  Functional Status: Independent  Completes ADLs independently?: Yes  Ambulates independently?: Yes  Does patient use assisted devices?: No  Does patient currently own DME?: Yes  What DME does the patient currently own?: Straight Cane, Walker  Does patient have a history of Outpatient Therapy (PT/OT)?: No  Does the patient have a history of Short-Term Rehab?: No  Does patient have a history of HHC?: Yes ([facility])  Does patient currently have HHC?: No         Patient Information Continued  Income Source: SSI/SSD  Does patient have prescription coverage?: Yes  Does patient receive dialysis treatments?: No  Does patient have a history of substance abuse?: Yes  Historical substance use preference: Cocaine, Alcohol/ETOH  History of Withdrawal Symptoms: Other withdrawal symptoms (specify in comment) (Irritation, cold sweats, dirrahea, loss of appetite)  Is patient currently in treatment for substance abuse?: Yes (At  [facility], would like admission to [facility])  Does patient have a history of Mental Health Diagnosis?: Yes  Is patient receiving treatment for mental health?: Yes (Medication managed by PCP)  Has patient received inpatient treatment related to mental health in the last 2 years?: No         Means of Transportation  Means of Transport to [facility]:: Drives Self      Social Determinants of Health (SDOH)      Flowsheet Row Most Recent Value   Housing Stability    In the last 12 months, was there a time when you were not able to pay the mortgage or rent on time? N   In the past 12 months, how many times have you moved where you were living? 0  [Did not move, but currently in rehab]   At any time in the past 12 months, were you homeless or living in a shelter (including now)? N   Transportation Needs    In the past 12 months, has lack of transportation kept you from medical appointments or from getting medications? no   In the past 12 months, has lack of transportation kept you from meetings, work, or from getting things needed for daily living? No   Food Insecurity    Within the past 12 months, you worried that your food would run out before you got the money to buy more. Never true   Within the past 12 months, the food you bought just didn't last and you didn't have money to get more. Never true   Utilities    In the past 12 months has the electric, gas, oil, or water company threatened to shut off services in your home? No            DISCHARGE DETAILS:    Discharge planning discussed with:: Pt  Freedom of Choice: Yes  Comments - Freedom of Choice: Discussed FOC  CM contacted family/caregiver?: No- see comments (Pt alert and oriented)           Other Referral/Resources/Interventions Provided:  Interventions: Substance Abuse Treatment  Referral Comments: Coordinating with [facility] ([place]: [phone] Ext.[phone]) for placement into [facility]. Awaiting paperwork.                       Met pt bedside to introduce self and role. Pt  is currently at [facility], but would like to go to [facility] upon d/c. When CM arrived bedside, pt was speaking with [facility] worker, David, about admission. CM provided unit fax number for her to fax paperwork to. Pt also signed medical information release form.   Prior to admission, pt was admitted to [facility] since 6/8. Before that, he was residing in a double home with wife and 3 sons. He was independent with ADLs and IADLs, able to ambulate without DME, and drives self. He had no hx of IP STR or OPPT, he had VNA service in the past but does not recall agency. He has hx of anxiety and depression, and under medication management of PCP. He has hx of EtOH and cocaine use, last use for both was 6/7 prior to admission to [facility]. CM office to follow for DCP.     CM reviewed d/c planning process including the following: identifying help at home, patient preference for d/c planning needs, Discharge Lounge, [facility] Meds to Bed program, availability of treatment team to discuss questions or concerns patient and/or family may have regarding understanding medications and recognizing signs and symptoms once discharged.  CM also encouraged patient to follow up with all recommended appointments after discharge. Patient advised of importance for patient and family to participate in managing patient’s medical well being.

## 2024-06-14 NOTE — ASSESSMENT & PLAN NOTE
Blood pressure elevated in the 190s  Resume home doxazosin, lisinopril  May need additional agent as Bps have remained elevated. Would monitor and encourage pain control/anxiety prior to initiating

## 2024-06-14 NOTE — UTILIZATION REVIEW
NOTIFICATION OF ADMISSION DISCHARGE   This is a Notification of Discharge from [facility]. Please be advised that this patient has been discharge from our facility. Below you will find the admission and discharge date and time including the patient’s disposition.   UTILIZATION REVIEW CONTACT:  Catracho Gutiérrez  Utilization   Network Utilization Review Department  Phone: [phone] x carefully listen to the prompts. All voicemails are confidential.  Email: [email or web address]     ADMISSION INFORMATION  PRESENTATION DATE: 6/13/2024  4:37 AM  OBERVATION ADMISSION DATE:   INPATIENT ADMISSION DATE: 6/13/24  7:43 AM   DISCHARGE DATE: 6/13/2024  1:34 PM   DISPOSITION:Home/Self Care    Network Utilization Review Department  ATTENTION: Please call with any questions or concerns to [phone] and carefully listen to the prompts so that you are directed to the right person. All voicemails are confidential.   For Discharge needs, contact Care Management DC Support Team at [phone] opt. 2  Send all requests for admission clinical reviews, approved or denied determinations and any other requests to dedicated fax number below belonging to the campus where the patient is receiving treatment. List of dedicated fax numbers for the Facilities:  FACILITY NAME UR FAX NUMBER   ADMISSION DENIALS (Administrative/Medical Necessity) [phone]   DISCHARGE SUPPORT TEAM ([facility]) [phone]   PARENT CHILD HEALTH (Maternity/NICU/Pediatrics) [phone]   [facility] [phone]   [facility] [phone]   [facility] [phone]   [facility] [phone]   [facility] [phone]   [facility] [phone]   [facility] [phone]   [facility] [phone]   [facility]  [facility] [phone]   [facility] [phone]   [facility] [phone]   [facility] [phone]

## 2024-06-15 LAB
AMPHETAMINES SERPL QL SCN: NEGATIVE
ANION GAP SERPL CALCULATED.3IONS-SCNC: 11 MMOL/L (ref 4–13)
BARBITURATES UR QL: NEGATIVE
BENZODIAZ UR QL: POSITIVE
BUN SERPL-MCNC: 21 MG/DL (ref 5–25)
CALCIUM SERPL-MCNC: 9 MG/DL (ref 8.4–10.2)
CHLORIDE SERPL-SCNC: 102 MMOL/L (ref 96–108)
CO2 SERPL-SCNC: 25 MMOL/L (ref 21–32)
COCAINE UR QL: NEGATIVE
CREAT SERPL-MCNC: 0.66 MG/DL (ref 0.6–1.3)
ERYTHROCYTE [DISTWIDTH] IN BLOOD BY AUTOMATED COUNT: 12.4 % (ref 11.6–15.1)
FENTANYL UR QL SCN: NEGATIVE
GFR SERPL CREATININE-BSD FRML MDRD: 105 ML/MIN/1.73SQ M
GLUCOSE SERPL-MCNC: 181 MG/DL (ref 65–140)
GLUCOSE SERPL-MCNC: 217 MG/DL (ref 65–140)
GLUCOSE SERPL-MCNC: 262 MG/DL (ref 65–140)
GLUCOSE SERPL-MCNC: 330 MG/DL (ref 65–140)
GLUCOSE SERPL-MCNC: 333 MG/DL (ref 65–140)
HCT VFR BLD AUTO: 44.3 % (ref 36.5–49.3)
HGB BLD-MCNC: 14.9 G/DL (ref 12–17)
HYDROCODONE UR QL SCN: NEGATIVE
MCH RBC QN AUTO: 30.3 PG (ref 26.8–34.3)
MCHC RBC AUTO-ENTMCNC: 33.6 G/DL (ref 31.4–37.4)
MCV RBC AUTO: 90 FL (ref 82–98)
METHADONE UR QL: NEGATIVE
OPIATES UR QL SCN: NEGATIVE
OXYCODONE+OXYMORPHONE UR QL SCN: POSITIVE
PCP UR QL: NEGATIVE
PLATELET # BLD AUTO: 275 THOUSANDS/UL (ref 149–390)
PMV BLD AUTO: 10.8 FL (ref 8.9–12.7)
POTASSIUM SERPL-SCNC: 4.7 MMOL/L (ref 3.5–5.3)
RBC # BLD AUTO: 4.91 MILLION/UL (ref 3.88–5.62)
SODIUM SERPL-SCNC: 138 MMOL/L (ref 135–147)
THC UR QL: NEGATIVE
WBC # BLD AUTO: 9.65 THOUSAND/UL (ref 4.31–10.16)

## 2024-06-15 PROCEDURE — 94760 N-INVAS EAR/PLS OXIMETRY 1: CPT

## 2024-06-15 PROCEDURE — 80307 DRUG TEST PRSMV CHEM ANLYZR: CPT

## 2024-06-15 PROCEDURE — 99232 SBSQ HOSP IP/OBS MODERATE 35: CPT

## 2024-06-15 PROCEDURE — 94664 DEMO&/EVAL PT USE INHALER: CPT

## 2024-06-15 PROCEDURE — 94640 AIRWAY INHALATION TREATMENT: CPT

## 2024-06-15 PROCEDURE — 80048 BASIC METABOLIC PNL TOTAL CA: CPT

## 2024-06-15 PROCEDURE — 82948 REAGENT STRIP/BLOOD GLUCOSE: CPT

## 2024-06-15 PROCEDURE — 85027 COMPLETE CBC AUTOMATED: CPT

## 2024-06-15 RX ORDER — INSULIN LISPRO 100 [IU]/ML
4 INJECTION, SOLUTION INTRAVENOUS; SUBCUTANEOUS
Status: DISCONTINUED | OUTPATIENT
Start: 2024-06-15 | End: 2024-06-15

## 2024-06-15 RX ORDER — DIAZEPAM 5 MG/1
10 TABLET ORAL EVERY 6 HOURS
Status: COMPLETED | OUTPATIENT
Start: 2024-06-15 | End: 2024-06-16

## 2024-06-15 RX ORDER — LABETALOL HYDROCHLORIDE 5 MG/ML
10 INJECTION, SOLUTION INTRAVENOUS EVERY 6 HOURS PRN
Status: DISCONTINUED | OUTPATIENT
Start: 2024-06-15 | End: 2024-06-15

## 2024-06-15 RX ORDER — AMLODIPINE BESYLATE 10 MG/1
10 TABLET ORAL DAILY
Status: DISCONTINUED | OUTPATIENT
Start: 2024-06-16 | End: 2024-06-17

## 2024-06-15 RX ORDER — AMLODIPINE BESYLATE 5 MG/1
5 TABLET ORAL DAILY
Status: DISCONTINUED | OUTPATIENT
Start: 2024-06-15 | End: 2024-06-15

## 2024-06-15 RX ORDER — INSULIN LISPRO 100 [IU]/ML
5 INJECTION, SOLUTION INTRAVENOUS; SUBCUTANEOUS
Status: DISCONTINUED | OUTPATIENT
Start: 2024-06-15 | End: 2024-06-18

## 2024-06-15 RX ORDER — LABETALOL HYDROCHLORIDE 5 MG/ML
10 INJECTION, SOLUTION INTRAVENOUS EVERY 4 HOURS PRN
Status: DISCONTINUED | OUTPATIENT
Start: 2024-06-15 | End: 2024-06-16

## 2024-06-15 RX ADMIN — THIAMINE HCL TAB 100 MG 100 MG: 100 TAB at 08:00

## 2024-06-15 RX ADMIN — LEVALBUTEROL HYDROCHLORIDE 1.25 MG: 1.25 SOLUTION RESPIRATORY (INHALATION) at 18:38

## 2024-06-15 RX ADMIN — DIAZEPAM 10 MG: 5 TABLET ORAL at 21:25

## 2024-06-15 RX ADMIN — INSULIN LISPRO 2 UNITS: 100 INJECTION, SOLUTION INTRAVENOUS; SUBCUTANEOUS at 07:58

## 2024-06-15 RX ADMIN — ACETAMINOPHEN 975 MG: 325 TABLET, FILM COATED ORAL at 15:05

## 2024-06-15 RX ADMIN — INSULIN LISPRO 5 UNITS: 100 INJECTION, SOLUTION INTRAVENOUS; SUBCUTANEOUS at 07:58

## 2024-06-15 RX ADMIN — DIAZEPAM 10 MG: 5 TABLET ORAL at 15:06

## 2024-06-15 RX ADMIN — INSULIN LISPRO 5 UNITS: 100 INJECTION, SOLUTION INTRAVENOUS; SUBCUTANEOUS at 12:46

## 2024-06-15 RX ADMIN — LABETALOL HYDROCHLORIDE 10 MG: 5 INJECTION, SOLUTION INTRAVENOUS at 16:34

## 2024-06-15 RX ADMIN — METOPROLOL TARTRATE 25 MG: 25 TABLET, FILM COATED ORAL at 16:34

## 2024-06-15 RX ADMIN — Medication 1 TABLET: at 08:00

## 2024-06-15 RX ADMIN — OXYCODONE HYDROCHLORIDE 5 MG: 5 TABLET ORAL at 07:54

## 2024-06-15 RX ADMIN — DOXAZOSIN 8 MG: 4 TABLET ORAL at 21:40

## 2024-06-15 RX ADMIN — LISINOPRIL 40 MG: 20 TABLET ORAL at 08:00

## 2024-06-15 RX ADMIN — IPRATROPIUM BROMIDE 0.5 MG: 0.5 SOLUTION RESPIRATORY (INHALATION) at 18:38

## 2024-06-15 RX ADMIN — IPRATROPIUM BROMIDE 0.5 MG: 0.5 SOLUTION RESPIRATORY (INHALATION) at 14:12

## 2024-06-15 RX ADMIN — DOXYCYCLINE 100 MG: 100 INJECTION, POWDER, LYOPHILIZED, FOR SOLUTION INTRAVENOUS at 16:30

## 2024-06-15 RX ADMIN — INSULIN LISPRO 6 UNITS: 100 INJECTION, SOLUTION INTRAVENOUS; SUBCUTANEOUS at 12:45

## 2024-06-15 RX ADMIN — AMLODIPINE BESYLATE 5 MG: 5 TABLET ORAL at 08:00

## 2024-06-15 RX ADMIN — HEPARIN SODIUM 5000 UNITS: 5000 INJECTION INTRAVENOUS; SUBCUTANEOUS at 21:25

## 2024-06-15 RX ADMIN — ATORVASTATIN CALCIUM 40 MG: 40 TABLET, FILM COATED ORAL at 15:05

## 2024-06-15 RX ADMIN — INSULIN LISPRO 5 UNITS: 100 INJECTION, SOLUTION INTRAVENOUS; SUBCUTANEOUS at 17:00

## 2024-06-15 RX ADMIN — HYDRALAZINE HYDROCHLORIDE 5 MG: 20 INJECTION, SOLUTION INTRAMUSCULAR; INTRAVENOUS at 06:33

## 2024-06-15 RX ADMIN — HYDRALAZINE HYDROCHLORIDE 5 MG: 20 INJECTION, SOLUTION INTRAMUSCULAR; INTRAVENOUS at 15:11

## 2024-06-15 RX ADMIN — LEVALBUTEROL HYDROCHLORIDE 1.25 MG: 1.25 SOLUTION RESPIRATORY (INHALATION) at 07:10

## 2024-06-15 RX ADMIN — PREDNISONE 40 MG: 20 TABLET ORAL at 08:01

## 2024-06-15 RX ADMIN — ACETAMINOPHEN 975 MG: 325 TABLET, FILM COATED ORAL at 06:12

## 2024-06-15 RX ADMIN — LEVALBUTEROL HYDROCHLORIDE 1.25 MG: 1.25 SOLUTION RESPIRATORY (INHALATION) at 14:12

## 2024-06-15 RX ADMIN — DIAZEPAM 10 MG: 5 TABLET ORAL at 07:54

## 2024-06-15 RX ADMIN — CLOPIDOGREL BISULFATE 75 MG: 75 TABLET, FILM COATED ORAL at 08:00

## 2024-06-15 RX ADMIN — ACETAMINOPHEN 975 MG: 325 TABLET, FILM COATED ORAL at 21:26

## 2024-06-15 RX ADMIN — FOLIC ACID 1 MG: 1 TABLET ORAL at 08:02

## 2024-06-15 RX ADMIN — IPRATROPIUM BROMIDE 0.5 MG: 0.5 SOLUTION RESPIRATORY (INHALATION) at 07:10

## 2024-06-15 RX ADMIN — INSULIN LISPRO 8 UNITS: 100 INJECTION, SOLUTION INTRAVENOUS; SUBCUTANEOUS at 17:00

## 2024-06-15 RX ADMIN — HEPARIN SODIUM 5000 UNITS: 5000 INJECTION INTRAVENOUS; SUBCUTANEOUS at 06:12

## 2024-06-15 RX ADMIN — DOXYCYCLINE 100 MG: 100 INJECTION, POWDER, LYOPHILIZED, FOR SOLUTION INTRAVENOUS at 02:50

## 2024-06-15 RX ADMIN — INSULIN LISPRO 3 UNITS: 100 INJECTION, SOLUTION INTRAVENOUS; SUBCUTANEOUS at 21:25

## 2024-06-15 RX ADMIN — ASPIRIN 81 MG CHEWABLE TABLET 81 MG: 81 TABLET CHEWABLE at 08:00

## 2024-06-15 NOTE — QUICK NOTE
"Called by Rn as pt and her were discussing code status. Pt requests being a full code . Wanting every thing done to \"keep him alive\".   "

## 2024-06-15 NOTE — ASSESSMENT & PLAN NOTE
Lab Results   Component Value Date    HGBA1C 7.0 (H) 06/13/2024       Recent Labs     06/14/24  1157 06/14/24  1712 06/14/24  2047 06/15/24  0751   POCGLU 224* 287* 406* 181*       Blood Sugar Average: Last 72 hrs:  (P) 277  Sliding scale algorithm increased   Increased humalog 5U TID with meals   Diabetic diet   Continue to adjust as needed

## 2024-06-15 NOTE — UTILIZATION REVIEW
SEE INITIAL REVIEW AT BOTTOM     6/15  Remains on  TIANNA.  Continue  CIWA  scores.      Continue  PT/OT, needs rehab at  d/c.  Monitor labs.   O2  sats    94  % RA.   Continue all current  meds.     Lung  sounds  normal.  Some abdominal distention noted.

## 2024-06-15 NOTE — ASSESSMENT & PLAN NOTE
Initially presented to ER from [place] run with increased somnolence and was noted to be in COPD exacerbation.  Patient was started on BiPAP initially and thereafter patient was taken off BiPAP with subsequent improvement in symptoms.  Patient was frustrated and upset about staying in the hospital and therefore left AMA only to return 30 minutes later after he spoke with his wife who told him to come back to the hospital for admission.  Currently saturating well on room air  Does have decreased breath sounds and wheezing on exam concerning for COPD exacerbation - has improved  Blood cultures pending  Strep pneumo urine antigen negative  RP2 negative   BNP 20, CXR with evidence of pulmonary vascular congestion. S/p 1 dose lasix 20mg  Pro-Kannan negative x2, CRP elevated  Pulmonology consulted, appreciate recs - see below   Continue prednisone 40 mg x 5 days - day 1/5  Continue Doxy x 5 days - day 3/5  Neb schedule 3 times daily  Airway clearance  BiPAP as needed  CIWA protocol

## 2024-06-15 NOTE — ASSESSMENT & PLAN NOTE
Blood pressure elevated in the 190s  Resume home doxazosin, lisinopril  Started on amlodipine 2.5mg on 6/15 - monitor response

## 2024-06-15 NOTE — PROGRESS NOTES
[facility]  Progress Note  Name: Kyler Almaguer I  MRN: [id number]  Unit/Bed#: -01 I Date of Admission: 6/13/2024   Date of Service: 6/15/2024 I Hospital Day: 2    Assessment & Plan   * Acute respiratory failure with hypoxia and hypercapnia (HCC)  Assessment & Plan  Initially presented to ER from [place] run with increased somnolence and was noted to be in COPD exacerbation.  Patient was started on BiPAP initially and thereafter patient was taken off BiPAP with subsequent improvement in symptoms.  Patient was frustrated and upset about staying in the hospital and therefore left AMA only to return 30 minutes later after he spoke with his wife who told him to come back to the hospital for admission.  Currently saturating well on room air  Does have decreased breath sounds and wheezing on exam concerning for COPD exacerbation - has improved  Blood cultures pending  Strep pneumo urine antigen negative  RP2 negative   BNP 20, CXR with evidence of pulmonary vascular congestion. S/p 1 dose lasix 20mg  Pro-Kannan negative x2, CRP elevated  Pulmonology consulted, appreciate recs - see below   Continue prednisone 40 mg x 5 days - day 1/5  Continue Doxy x 5 days - day 3/5  Neb schedule 3 times daily  Airway clearance  BiPAP as needed  CIWA protocol    Rib fracture  Assessment & Plan  Noted to have nondisplaced right sixth and seventh rib fracture on rib series  Patient reportedly fell and hit the corner of a table a few days ago  Incentive spirometry  Pain control with Tylenol, lidocaine patch and as needed oxycodone  EKG without arrhythmias x24 hours. Will discontinue     Alcohol abuse  Assessment & Plan  Last drink on 6/7  Continue CIWA protocol  Valium as needed  CM working on placement for rehab to [facility] - requirements listed below   Medically cleared for discharge  PT/OT notes that verify patient has no post acute rehab needs  Repeat CXR - ordered and finalized without acute  cardiopulmonary disease  Discontinued off oxy x24 hours - discontinued     History of crack cocaine use  Assessment & Plan  Reported by family  Monitor     History of stroke  Assessment & Plan  Continue PTA aspirin, statin and Plavix  Previously admitted with encephalopathy likely TME related to hypercapnia  Mentation currently at baseline and improving    Type 2 diabetes mellitus without complication, without long-term current use of insulin (HCC)  Assessment & Plan  Lab Results   Component Value Date    HGBA1C 7.0 (H) 06/13/2024       Recent Labs     06/14/24  1157 06/14/24  1712 06/14/24  2047 06/15/24  0751   POCGLU 224* 287* 406* 181*       Blood Sugar Average: Last 72 hrs:  (P) 277  Sliding scale algorithm increased   Increased humalog 5U TID with meals   Diabetic diet   Continue to adjust as needed    Primary hypertension  Assessment & Plan  Blood pressure elevated in the 190s  Resume home doxazosin, lisinopril  Started on amlodipine 2.5mg on 6/15 - monitor response         VTE Pharmacologic Prophylaxis:   Moderate Risk (Score 3-4) - Pharmacological DVT Prophylaxis Ordered: heparin.    Mobility:   Basic Mobility Inpatient Raw Score: 24  JH-HLM Goal: 8: Walk 250 feet or more  JH-HLM Achieved: 8: Walk 250 feet ot more  JH-HLM Goal achieved. Continue to encourage appropriate mobility.    Patient Centered Rounds: I performed bedside rounds with nursing staff today.   Discussions with Specialists or Other Care Team Provider: None    Education and Discussions with Family / Patient: Patient declined call to .     Total Time Spent on Date of Encounter in care of patient: This time was spent on one or more of the following: performing physical exam; counseling and coordination of care; obtaining or reviewing history; documenting in the medical record; reviewing/ordering tests, medications or procedures; communicating with other healthcare professionals and discussing with patient's  family/caregivers.    Current Length of Stay: 2 day(s)  Current Patient Status: Inpatient   Certification Statement: The patient will continue to require additional inpatient hospital stay due to blood pressure management  Discharge Plan: Anticipate discharge in 24-48 hrs to rehab facility.    Code Status: Level 1 - Full Code    Subjective:   Seen and examined. No acute events overnight.     Objective:     Vitals:   Temp (24hrs), Av.1 °F (36.7 °C), Min:97.4 °F (36.3 °C), Max:98.6 °F (37 °C)    Temp:  [97.4 °F (36.3 °C)-98.6 °F (37 °C)] 97.4 °F (36.3 °C)  HR:  [] 95  Resp:  [18-19] 19  BP: (145-191)/() 191/121  SpO2:  [90 %-94 %] 93 %  Body mass index is 36.59 kg/m².     Input and Output Summary (last 24 hours):     Intake/Output Summary (Last 24 hours) at 6/15/2024 1050  Last data filed at 6/15/2024 0900  Gross per 24 hour   Intake 791 ml   Output 0 ml   Net 791 ml       Physical Exam:   Physical Exam  Constitutional:       General: He is not in acute distress.     Appearance: He is obese.   HENT:      Mouth/Throat:      Mouth: Mucous membranes are moist.   Eyes:      General:         Right eye: No discharge.         Left eye: No discharge.   Cardiovascular:      Heart sounds: Normal heart sounds.   Pulmonary:      Breath sounds: Normal breath sounds.   Abdominal:      General: There is distension.   Musculoskeletal:      Right lower leg: No edema.      Left lower leg: No edema.   Skin:     General: Skin is warm and dry.   Neurological:      Mental Status: Mental status is at baseline.        Additional Data:     Labs:  Results from last 7 days   Lab Units 06/15/24  0524 24  0543   WBC Thousand/uL 9.65 9.94   HEMOGLOBIN g/dL 14.9 15.4   HEMATOCRIT % 44.3 46.0   PLATELETS Thousands/uL 275 277   SEGS PCT %  --  88*   LYMPHO PCT %  --  8*   MONO PCT %  --  3*   EOS PCT %  --  0     Results from last 7 days   Lab Units 06/15/24  0524 24  0449 24  0921   SODIUM mmol/L 138   < > 141    POTASSIUM mmol/L 4.7   < > 4.2   CHLORIDE mmol/L 102   < > 101   CO2 mmol/L 25   < > 33*   BUN mg/dL 21   < > 12   CREATININE mg/dL 0.66   < > 0.73   ANION GAP mmol/L 11   < > 7   CALCIUM mg/dL 9.0   < > 9.5   ALBUMIN g/dL  --   --  3.9   TOTAL BILIRUBIN mg/dL  --   --  0.25   ALK PHOS U/L  --   --  84   ALT U/L  --   --  79*   AST U/L  --   --  37   GLUCOSE RANDOM mg/dL 217*   < > 211*    < > = values in this interval not displayed.         Results from last 7 days   Lab Units 06/15/24  0751 06/14/24  2047 06/14/24  1712 06/14/24  1157 06/14/24  0829 06/13/24  2044 06/13/24  1704 06/13/24  0834 06/12/24  1041 06/11/24  1642 06/11/24  1323 06/11/24  0910   POC GLUCOSE mg/dl 181* 406* 287* 224* 209* 320* 355* 168* 175* 193* 134 213*     Results from last 7 days   Lab Units 06/13/24  0752 06/11/24  1326   HEMOGLOBIN A1C % 7.0* 7.2*     Results from last 7 days   Lab Units 06/14/24  0543 06/13/24  0449   PROCALCITONIN ng/ml 0.07 0.11       Lines/Drains:  Invasive Devices       Peripheral Intravenous Line  Duration             Peripheral IV 06/14/24 Dorsal (posterior);Left Hand <1 day                      Telemetry:  Telemetry Orders (From admission, onward)               24 Hour Telemetry Monitoring  Continuous x 24 Hours (Telem)        Question:  Reason for 24 Hour Telemetry  Answer:  Syncope suspected to be cardiac in origin                     Telemetry Reviewed: Normal Sinus Rhythm  Indication for Continued Telemetry Use: No indication for continued use. Will discontinue.              Imaging: Reviewed radiology reports from this admission including: chest xray    Recent Cultures (last 7 days):   Results from last 7 days   Lab Units 06/14/24  1103 06/13/24  1443   BLOOD CULTURE   --  No Growth at 24 hrs.  No Growth at 24 hrs.   LEGIONELLA URINARY ANTIGEN  Negative  --        Last 24 Hours Medication List:   Current Facility-Administered Medications   Medication Dose Route Frequency Provider Last Rate     acetaminophen  975 mg Oral Q8H [facility], DO      albuterol  2.5 mg Nebulization Q4H PRN Nadine Reyez MD      amLODIPine  5 mg Oral Daily Kathy Downey PA-C      aspirin  81 mg Oral Daily [facility], DO      atorvastatin  40 mg Oral Daily With Dinner [facility], DO      clopidogrel  75 mg Oral Daily [facility], DO      diazepam  10 mg Oral Q6H PRN [facility], DO      doxazosin  8 mg Oral HS [facility], DO      doxycycline  100 mg Intravenous Q12H [facility],  mg (06/15/24 0250)    folic acid  1 mg Oral Daily [facility], DO      heparin (porcine)  5,000 Units Subcutaneous Q8H [facility], DO      hydrALAZINE  5 mg Intravenous Q6H PRN Kathy Downey PA-C      insulin lispro  1-6 Units Subcutaneous HS Kathy Downey PA-C      insulin lispro  2-12 Units Subcutaneous TID AC Kathy Downey PA-C      insulin lispro  5 Units Subcutaneous TID With Meals Kathy Downey PA-C      ipratropium  0.5 mg Nebulization TID [facility], DO      levalbuterol  1.25 mg Nebulization TID [facility], DO      lidocaine  1 patch Topical Daily [facility], DO      lisinopril  40 mg Oral Daily [facility], DO      multivitamin-minerals  1 tablet Oral Daily [facility], DO      nicotine  1 patch Transdermal Daily [facility], DO      predniSONE  40 mg Oral Daily Kathy Downey PA-C      Thiamine Mononitrate  100 mg Oral Daily [facility], DO          Today, Patient Was Seen By: Kathy Downey PA-C    **Please Note: This note may have been constructed using a voice recognition system.**

## 2024-06-15 NOTE — ASSESSMENT & PLAN NOTE
Last drink on 6/7  Continue CIWA protocol  Valium as needed  CM working on placement for rehab to [facility] - requirements listed below   Medically cleared for discharge  PT/OT notes that verify patient has no post acute rehab needs  Repeat CXR - ordered and finalized without acute cardiopulmonary disease  Discontinued off oxy x24 hours - discontinued

## 2024-06-16 PROBLEM — J96.02 ACUTE RESPIRATORY FAILURE WITH HYPOXIA AND HYPERCAPNIA (HCC): Status: RESOLVED | Noted: 2024-06-13 | Resolved: 2024-06-16

## 2024-06-16 PROBLEM — J96.01 ACUTE RESPIRATORY FAILURE WITH HYPOXIA AND HYPERCAPNIA (HCC): Status: RESOLVED | Noted: 2024-06-13 | Resolved: 2024-06-16

## 2024-06-16 PROBLEM — J44.1 COPD EXACERBATION (HCC): Status: ACTIVE | Noted: 2024-06-16

## 2024-06-16 LAB
GLUCOSE SERPL-MCNC: 151 MG/DL (ref 65–140)
GLUCOSE SERPL-MCNC: 200 MG/DL (ref 65–140)
GLUCOSE SERPL-MCNC: 257 MG/DL (ref 65–140)
GLUCOSE SERPL-MCNC: 332 MG/DL (ref 65–140)

## 2024-06-16 PROCEDURE — 94640 AIRWAY INHALATION TREATMENT: CPT

## 2024-06-16 PROCEDURE — 99233 SBSQ HOSP IP/OBS HIGH 50: CPT

## 2024-06-16 PROCEDURE — 82948 REAGENT STRIP/BLOOD GLUCOSE: CPT

## 2024-06-16 PROCEDURE — 94760 N-INVAS EAR/PLS OXIMETRY 1: CPT

## 2024-06-16 PROCEDURE — 94664 DEMO&/EVAL PT USE INHALER: CPT

## 2024-06-16 RX ORDER — DIAZEPAM 5 MG/1
10 TABLET ORAL EVERY 6 HOURS PRN
Status: COMPLETED | OUTPATIENT
Start: 2024-06-16 | End: 2024-06-17

## 2024-06-16 RX ORDER — POLYETHYLENE GLYCOL 3350 17 G/17G
17 POWDER, FOR SOLUTION ORAL DAILY PRN
Status: DISCONTINUED | OUTPATIENT
Start: 2024-06-16 | End: 2024-06-18 | Stop reason: HOSPADM

## 2024-06-16 RX ORDER — DIAZEPAM 5 MG/1
10 TABLET ORAL EVERY 6 HOURS
Status: DISCONTINUED | OUTPATIENT
Start: 2024-06-16 | End: 2024-06-16

## 2024-06-16 RX ORDER — HYDROCHLOROTHIAZIDE 25 MG/1
25 TABLET ORAL 2 TIMES DAILY
Status: DISCONTINUED | OUTPATIENT
Start: 2024-06-16 | End: 2024-06-17

## 2024-06-16 RX ORDER — INSULIN LISPRO 100 [IU]/ML
2-12 INJECTION, SOLUTION INTRAVENOUS; SUBCUTANEOUS
Status: DISCONTINUED | OUTPATIENT
Start: 2024-06-16 | End: 2024-06-18 | Stop reason: HOSPADM

## 2024-06-16 RX ORDER — INSULIN GLARGINE 100 [IU]/ML
5 INJECTION, SOLUTION SUBCUTANEOUS
Status: DISCONTINUED | OUTPATIENT
Start: 2024-06-16 | End: 2024-06-17

## 2024-06-16 RX ORDER — HYDRALAZINE HYDROCHLORIDE 20 MG/ML
10 INJECTION INTRAMUSCULAR; INTRAVENOUS EVERY 6 HOURS PRN
Status: DISCONTINUED | OUTPATIENT
Start: 2024-06-16 | End: 2024-06-18 | Stop reason: HOSPADM

## 2024-06-16 RX ORDER — HYDROCHLOROTHIAZIDE 25 MG/1
25 TABLET ORAL 2 TIMES DAILY
Status: DISCONTINUED | OUTPATIENT
Start: 2024-06-16 | End: 2024-06-16

## 2024-06-16 RX ADMIN — DOXAZOSIN 8 MG: 4 TABLET ORAL at 21:36

## 2024-06-16 RX ADMIN — INSULIN LISPRO 5 UNITS: 100 INJECTION, SOLUTION INTRAVENOUS; SUBCUTANEOUS at 12:21

## 2024-06-16 RX ADMIN — ASPIRIN 81 MG CHEWABLE TABLET 81 MG: 81 TABLET CHEWABLE at 08:27

## 2024-06-16 RX ADMIN — NICOTINE 1 PATCH: 21 PATCH, EXTENDED RELEASE TRANSDERMAL at 08:29

## 2024-06-16 RX ADMIN — LEVALBUTEROL HYDROCHLORIDE 1.25 MG: 1.25 SOLUTION RESPIRATORY (INHALATION) at 07:20

## 2024-06-16 RX ADMIN — IPRATROPIUM BROMIDE 0.5 MG: 0.5 SOLUTION RESPIRATORY (INHALATION) at 13:58

## 2024-06-16 RX ADMIN — METOPROLOL TARTRATE 25 MG: 25 TABLET, FILM COATED ORAL at 08:28

## 2024-06-16 RX ADMIN — IPRATROPIUM BROMIDE 0.5 MG: 0.5 SOLUTION RESPIRATORY (INHALATION) at 20:07

## 2024-06-16 RX ADMIN — LEVALBUTEROL HYDROCHLORIDE 1.25 MG: 1.25 SOLUTION RESPIRATORY (INHALATION) at 20:07

## 2024-06-16 RX ADMIN — INSULIN LISPRO 6 UNITS: 100 INJECTION, SOLUTION INTRAVENOUS; SUBCUTANEOUS at 17:37

## 2024-06-16 RX ADMIN — DOXYCYCLINE 100 MG: 100 INJECTION, POWDER, LYOPHILIZED, FOR SOLUTION INTRAVENOUS at 03:25

## 2024-06-16 RX ADMIN — LISINOPRIL 40 MG: 20 TABLET ORAL at 08:28

## 2024-06-16 RX ADMIN — INSULIN LISPRO 5 UNITS: 100 INJECTION, SOLUTION INTRAVENOUS; SUBCUTANEOUS at 17:37

## 2024-06-16 RX ADMIN — DIAZEPAM 10 MG: 5 TABLET ORAL at 03:26

## 2024-06-16 RX ADMIN — HEPARIN SODIUM 5000 UNITS: 5000 INJECTION INTRAVENOUS; SUBCUTANEOUS at 05:01

## 2024-06-16 RX ADMIN — CLOPIDOGREL BISULFATE 75 MG: 75 TABLET, FILM COATED ORAL at 08:28

## 2024-06-16 RX ADMIN — FOLIC ACID 1 MG: 1 TABLET ORAL at 08:28

## 2024-06-16 RX ADMIN — DOXYCYCLINE 100 MG: 100 INJECTION, POWDER, LYOPHILIZED, FOR SOLUTION INTRAVENOUS at 15:01

## 2024-06-16 RX ADMIN — ACETAMINOPHEN 975 MG: 325 TABLET, FILM COATED ORAL at 05:01

## 2024-06-16 RX ADMIN — ATORVASTATIN CALCIUM 40 MG: 40 TABLET, FILM COATED ORAL at 17:37

## 2024-06-16 RX ADMIN — PREDNISONE 40 MG: 20 TABLET ORAL at 08:27

## 2024-06-16 RX ADMIN — LEVALBUTEROL HYDROCHLORIDE 1.25 MG: 1.25 SOLUTION RESPIRATORY (INHALATION) at 13:58

## 2024-06-16 RX ADMIN — HYDROCHLOROTHIAZIDE 25 MG: 25 TABLET ORAL at 14:33

## 2024-06-16 RX ADMIN — LABETALOL HYDROCHLORIDE 10 MG: 5 INJECTION, SOLUTION INTRAVENOUS at 03:39

## 2024-06-16 RX ADMIN — HEPARIN SODIUM 5000 UNITS: 5000 INJECTION INTRAVENOUS; SUBCUTANEOUS at 21:34

## 2024-06-16 RX ADMIN — INSULIN LISPRO 8 UNITS: 100 INJECTION, SOLUTION INTRAVENOUS; SUBCUTANEOUS at 21:38

## 2024-06-16 RX ADMIN — DIAZEPAM 10 MG: 5 TABLET ORAL at 20:23

## 2024-06-16 RX ADMIN — THIAMINE HCL TAB 100 MG 100 MG: 100 TAB at 08:28

## 2024-06-16 RX ADMIN — POLYETHYLENE GLYCOL 3350 17 G: 17 POWDER, FOR SOLUTION ORAL at 13:01

## 2024-06-16 RX ADMIN — Medication 1 TABLET: at 08:27

## 2024-06-16 RX ADMIN — DIAZEPAM 10 MG: 5 TABLET ORAL at 08:27

## 2024-06-16 RX ADMIN — INSULIN LISPRO 5 UNITS: 100 INJECTION, SOLUTION INTRAVENOUS; SUBCUTANEOUS at 08:29

## 2024-06-16 RX ADMIN — INSULIN GLARGINE 5 UNITS: 100 INJECTION, SOLUTION SUBCUTANEOUS at 21:32

## 2024-06-16 RX ADMIN — INSULIN LISPRO 2 UNITS: 100 INJECTION, SOLUTION INTRAVENOUS; SUBCUTANEOUS at 08:28

## 2024-06-16 RX ADMIN — ACETAMINOPHEN 975 MG: 325 TABLET, FILM COATED ORAL at 21:31

## 2024-06-16 RX ADMIN — IPRATROPIUM BROMIDE 0.5 MG: 0.5 SOLUTION RESPIRATORY (INHALATION) at 07:20

## 2024-06-16 RX ADMIN — INSULIN LISPRO 4 UNITS: 100 INJECTION, SOLUTION INTRAVENOUS; SUBCUTANEOUS at 12:21

## 2024-06-16 RX ADMIN — AMLODIPINE BESYLATE 10 MG: 10 TABLET ORAL at 08:27

## 2024-06-16 RX ADMIN — DIAZEPAM 10 MG: 5 TABLET ORAL at 14:33

## 2024-06-16 NOTE — ASSESSMENT & PLAN NOTE
Last drink on 6/7  Discontinued CIWA  Valium as needed x 3 doses   CM working on placement for rehab to [facility] - requirements listed below   PT/OT notes that verify patient has minimal STR needs  Repeat CXR - ordered and finalized without acute cardiopulmonary disease  Discontinued off oxy x24 hours  Medically cleared for discharge, pending inpatient EtOH rehab placement

## 2024-06-16 NOTE — ASSESSMENT & PLAN NOTE
Initially presented to ER from [place] run with increased somnolence and was noted to be in COPD exacerbation.  Patient was started on BiPAP initially and thereafter patient was taken off BiPAP with subsequent improvement in symptoms.  Patient was frustrated and upset about staying in the hospital and therefore left AMA only to return 30 minutes later after he spoke with his wife who told him to come back to the hospital for admission.  Currently saturating well on room air  Does have decreased breath sounds and wheezing on exam concerning for COPD exacerbation - has improved  Blood cultures pending  Strep pneumo urine antigen negative  RP2 negative   BNP 20, CXR with evidence of pulmonary vascular congestion. S/p 1 dose lasix 20mg  Pro-Kannan negative x2, CRP elevated  Pulmonology consulted, appreciate recs - see below   Continue prednisone 40 mg x 5 days - day 1/5  Continue Doxy x 5 days - day 3/5  Neb schedule 3 times daily  Airway clearance  BiPAP as needed  CIWA protocol

## 2024-06-16 NOTE — ASSESSMENT & PLAN NOTE
POA: Improving although still has faint wheezing on exam.    Plan:  Appreciate Pulmonology  recommendations  Continue prednisone 40 mg daily, day 3/5  Continue doxycycline 100 mg Q12h bid, day 5/5  Continue Atrovent/Xopenex nebs 3 times daily  Will need outpatient Pulmonology f/u

## 2024-06-16 NOTE — ASSESSMENT & PLAN NOTE
Lab Results   Component Value Date    HGBA1C 7.0 (H) 06/13/2024       Recent Labs     06/16/24  1651 06/16/24  2048 06/17/24  0731 06/17/24  1131   POCGLU 257* 332* 166* 216*     Blood Sugar Average: Last 72 hrs:  (P) [id number]. Takes metformin as outpatient. Elevated BG levels on mealtime bolus and sliding scale insulin.    Plan:  Hold PTA metformin while inpatient   Increase Lantus to 10 units daily at bedtime  Continue lispro 5 unit tid AC   Continue Accu-Cheks QID with SSI correction, BG goal 140-180  Continue diabetic diet   Hypoglycemia protocol  Adjust insulin regimen as needed based on BG levels

## 2024-06-16 NOTE — PROGRESS NOTES
[facility]   PROGRESS NOTE- Kyler Almaguer, 1965, 58 y.o. male MRN: [id number]   Unit/Bed#: /-01 Encounter: [id number]   Primary Care Physician: Maisha Mott MD   Date and Time Admitted to Hospital: 6/13/2024  1:43 PM     Assessment/Plan:   * Acute respiratory failure with hypoxia and hypercapnia (HCC)-resolved as of 6/16/2024  Assessment & Plan  Initially presented to ER from [place] with increased somnolence and was noted to be in COPD exacerbation.  Patient was started on BiPAP initially and thereafter patient was taken off BiPAP with subsequent improvement in symptoms.  Patient was frustrated and upset about staying in the hospital and therefore left AMA only to return 30 minutes later after he spoke with his wife who told him to come back to the hospital for admission.  Currently saturating well on room air  Does have decreased breath sounds and wheezing on exam concerning for COPD exacerbation - has improved  Blood cultures pending  Strep pneumo urine antigen negative  RP2 negative   BNP 20, CXR with evidence of pulmonary vascular congestion. S/p 1 dose lasix 20mg  Pro-Kannan negative x2, CRP elevated  Pulmonology consulted, appreciate recs - see below   Continue prednisone 40 mg x 5 days - day 1/5  Continue Doxy x 5 days - day 3/5  Neb schedule 3 times daily  Airway clearance  BiPAP as needed  CIWA protocol    COPD exacerbation (HCC)  Assessment & Plan  POA: Improving although still has faint wheezing on exam.    Plan:  Appreciate Pulmonology  recommendations  Continue prednisone 40 mg daily, day 2/5  Continue doxycycline 100 mg Q12h bid, day 4/5  Continue Atrovent/Xopenex nebs 3 times daily  Will need outpatient Pulmonology f/u     Uncontrolled hypertension  Assessment & Plan  Blood pressure elevated in the 190s  Resume home doxazosin, lisinopril  Added Norvasc and Lopressor with BP improved but still elevated in 150s to 160s.    Plan:  Continue  PTA doxazosin and lisinopril  Continue Norvasc 10 mg daily  Will discontinue Lopressor given minimal effect on BP and currently still and COPD exacerbation  Start HTCZ 25 mg bid   Hydralazine 10 mg IV PRN for SBP > 160    Type 2 diabetes mellitus without complication, without long-term current use of insulin (HCC)  Assessment & Plan  Lab Results   Component Value Date    HGBA1C 7.0 (H) 06/13/2024       Recent Labs     06/15/24  1647 06/15/24  2036 06/16/24  0745 06/16/24  1149   POCGLU 330* 333* 151* 200*       Blood Sugar Average: Last 72 hrs:  (P) [id number]. Takes metformin as outpatient. Elevated BG levels on mealtime bolus and sliding scale insulin.    Plan:  Hold PTA metformin while inpatient   Start Lantus 5 units nightly  Continue lispro 5 unit tid AC   Continue Accu-Cheks QID with SSI correction, BG goal 140-180  Continue diabetic diet   Hypoglycemia protocol  Adjust insulin regimen as needed based on BG levels    Alcohol abuse  Assessment & Plan  Last drink on 6/7  Discontinued CIWA  Valium as needed x 3 doses   CM working on placement for rehab to [facility] - requirements listed below   PT/OT notes that verify patient has minimal STR needs  Repeat CXR - ordered and finalized without acute cardiopulmonary disease  Discontinued off oxy x24 hours  Medically cleared for discharge, pending inpatient EtOH rehab placement        Patient-Centered Rounds: Performed bedside rounds with nursing staff.   Discussions with Specialists or Other Care Team Provider: None  Education and Discussions: Updated  (wife) via phone.    Code Status: Level 1 - Full Code  Current Length of Stay: 3 day(s)  Current Patient Status: Inpatient   Discharge Plan: Anticipate discharge tomorrow to inpatient alcohol rehab    Active Prophylaxis/Antibiotics  Antibiotics  doxycycline, Intravenous, 100 mg at 06/16/24 0325    VTE covered by:  heparin (porcine), Subcutaneous, 5,000 Units at 06/16/24 0501    SCDs or foot pump  applied         Diet Orders   (From admission, onward)                 Start     Ordered    24 0750  Diet Kannan/CHO Controlled; Consistent Carbohydrate Diet Level 2 (5 carb servings/75 grams CHO/meal)  Diet effective now        References:    Adult Nutrition Support Algorithm    RD Therapeutic Diet Order Protocol   Question Answer Comment   Diet Type Kannan/CHO Controlled    Kannan/CHO Controlled Consistent Carbohydrate Diet Level 2 (5 carb servings/75 grams CHO/meal)    RD to adjust diet per protocol? Yes        24 2964                   Subjective:   Patient was seen and examined at bedside this a.m. in no acute distress. No significant events overnight. Reports feeling well today. Denies fever, chills, lightheadedness/dizziness, chest pain, SOB, N/V, abdominal pain, or urinary symptoms.  Patient remains medically stable otherwise with no acute concerns at this time.     Objective:   Vitals:   Temp (24hrs), Av.7 °F (36.5 °C), Min:97.6 °F (36.4 °C), Max:97.8 °F (36.6 °C)    Temp:  [97.6 °F (36.4 °C)-97.8 °F (36.6 °C)] 97.6 °F (36.4 °C)  HR:  [] 75  Resp:  [16-19] 19  BP: (141-177)/() 164/99  SpO2:  [92 %-97 %] 93 %  Body mass index is 36.59 kg/m².     Input and Output Summary (last 24 hours):     Intake/Output Summary (Last 24 hours) at 2024 1353  Last data filed at 2024 1201  Gross per 24 hour   Intake 632 ml   Output 1410 ml   Net -778 ml     Physical Exam  Constitutional:       General: He is not in acute distress.     Appearance: He is not toxic-appearing.   HENT:      Head: Normocephalic and atraumatic.   Cardiovascular:      Rate and Rhythm: Normal rate and regular rhythm.      Heart sounds: Normal heart sounds.   Pulmonary:      Effort: No respiratory distress.      Breath sounds: Decreased breath sounds and wheezing present. No rhonchi or rales.   Abdominal:      General: There is no distension.      Palpations: Abdomen is soft.      Tenderness: There is no abdominal  tenderness.   Musculoskeletal:      Cervical back: Neck supple.      Right lower leg: No edema.      Left lower leg: No edema.   Skin:     General: Skin is warm and dry.      Findings: No lesion or rash.   Neurological:      General: No focal deficit present.      Mental Status: He is alert and oriented to person, place, and time.          Additional Data:   Labs:  Results from last 7 days   Lab Units 06/15/24  0524 06/14/24  0543   WBC Thousand/uL 9.65 9.94   HEMOGLOBIN g/dL 14.9 15.4   HEMATOCRIT % 44.3 46.0   PLATELETS Thousands/uL 275 277   SEGS PCT %  --  88*   LYMPHO PCT %  --  8*   MONO PCT %  --  3*   EOS PCT %  --  0     Results from last 7 days   Lab Units 06/15/24  0524 06/13/24  0449 06/11/24  0921   SODIUM mmol/L 138   < > 141   POTASSIUM mmol/L 4.7   < > 4.2   CHLORIDE mmol/L 102   < > 101   CO2 mmol/L 25   < > 33*   BUN mg/dL 21   < > 12   CREATININE mg/dL 0.66   < > 0.73   ANION GAP mmol/L 11   < > 7   CALCIUM mg/dL 9.0   < > 9.5   ALBUMIN g/dL  --   --  3.9   TOTAL BILIRUBIN mg/dL  --   --  0.25   ALK PHOS U/L  --   --  84   ALT U/L  --   --  79*   AST U/L  --   --  37   GLUCOSE RANDOM mg/dL 217*   < > 211*    < > = values in this interval not displayed.         Results from last 7 days   Lab Units 06/16/24  1149 06/16/24  0745 06/15/24  2036 06/15/24  1647 06/15/24  1059 06/15/24  0751 06/14/24  2047 06/14/24  1712 06/14/24  1157 06/14/24  0829 06/13/24  2044 06/13/24  1704   POC GLUCOSE mg/dl 200* 151* 333* 330* 262* 181* 406* 287* 224* 209* 320* 355*     Results from last 7 days   Lab Units 06/13/24  0752 06/11/24  1326   HEMOGLOBIN A1C % 7.0* 7.2*     Results from last 7 days   Lab Units 06/14/24  0543 06/13/24  0449   PROCALCITONIN ng/ml 0.07 0.11     Lines/Drains:  Invasive Devices       Peripheral Intravenous Line  Duration             Peripheral IV 06/14/24 Dorsal (posterior);Left Hand 1 day                        Imaging: Reviewed pertinent radiology reports from this admission.   Recent  Cultures (last 7 days):   Results from last 7 days   Lab Units 06/14/24  1103 06/13/24  1443   BLOOD CULTURE   --  No Growth at 48 hrs.  No Growth at 48 hrs.   LEGIONELLA URINARY ANTIGEN  Negative  --      Last 24 Hours Medication List:   Current Facility-Administered Medications   Medication Dose Route Frequency Provider Last Rate    acetaminophen  975 mg Oral Q8H [facility] Harnishkumar Kolb, DO      albuterol  2.5 mg Nebulization Q4H PRN Nadine Reyez MD      amLODIPine  10 mg Oral Daily Kathy Downey PA-C      aspirin  81 mg Oral Daily Harnishkumar Kolb, DO      atorvastatin  40 mg Oral Daily With Dinner Harnishkumar Kolb, DO      clopidogrel  75 mg Oral Daily [facility]      diazepam  10 mg Oral Q6H PRN Cristina Franks MD      doxazosin  8 mg Oral HS [facility], [facility]      doxycycline  100 mg Intravenous Q12H [facility],  mg (06/16/24 0325)    folic acid  1 mg Oral Daily [facility], DO      heparin (porcine)  5,000 Units Subcutaneous Q8H [facility]      hydrALAZINE  10 mg Intravenous Q6H PRN Cristina Franks MD      hydroCHLOROthiazide  25 mg Oral BID Cristina Franks MD      insulin glargine  5 Units Subcutaneous HS Cristina Franks MD      insulin lispro  2-12 Units Subcutaneous 4x Daily (AC & HS) Cristina Franks MD      insulin lispro  5 Units Subcutaneous TID With Meals Kathy Downey PA-C      ipratropium  0.5 mg Nebulization TID [facility]      levalbuterol  1.25 mg Nebulization TID Harnishkumar Kolb, DO      lidocaine  1 patch Topical Daily Harnishkumar Kolb, DO      lisinopril  40 mg Oral Daily Harnishkumar Kolb, DO      multivitamin-minerals  1 tablet Oral Daily [facility], [facility]      nicotine  1 patch Transdermal Daily Harnishkumar Kolb, DO      polyethylene glycol  17 g Oral Daily PRN Cristina Franks MD      predniSONE  40 mg Oral Daily Kathy Downey PA-C      Thiamine Mononitrate  100 mg Oral Daily [facility], [facility]       Today, Patient  Was Seen By: Cristina Franks MD  **Please Note: This note may have been constructed using a voice recognition system.**

## 2024-06-16 NOTE — PLAN OF CARE
Problem: PAIN - ADULT  Goal: Verbalizes/displays adequate comfort level or baseline comfort level  Description: Interventions:  - Encourage patient to monitor pain and request assistance  - Assess pain using appropriate pain scale  - Administer analgesics based on type and severity of pain and evaluate response  - Implement non-pharmacological measures as appropriate and evaluate response  - Consider cultural and social influences on pain and pain management  - Notify physician/advanced practitioner if interventions unsuccessful or patient reports new pain  Outcome: Progressing     Problem: DISCHARGE PLANNING  Goal: Discharge to home or other facility with appropriate resources  Description: INTERVENTIONS:  - Identify barriers to discharge w/patient and caregiver  - Arrange for needed discharge resources and transportation as appropriate  - Identify discharge learning needs (meds, wound care, etc.)  - Arrange for interpretive services to assist at discharge as needed  - Refer to Case Management Department for coordinating discharge planning if the patient needs post-hospital services based on physician/advanced practitioner order or complex needs related to functional status, cognitive ability, or social support system  Outcome: Progressing     Problem: Knowledge Deficit  Goal: Patient/family/caregiver demonstrates understanding of disease process, treatment plan, medications, and discharge instructions  Description: Complete learning assessment and assess knowledge base.  Interventions:  - Provide teaching at level of understanding  - Provide teaching via preferred learning methods  Outcome: Progressing     Problem: CARDIOVASCULAR - ADULT  Goal: Maintains optimal cardiac output and hemodynamic stability  Description: INTERVENTIONS:  - Monitor I/O, vital signs and rhythm  - Monitor for S/S and trends of decreased cardiac output  - Administer and titrate ordered vasoactive medications to optimize hemodynamic  stability  - Assess quality of pulses, skin color and temperature  - Assess for signs of decreased coronary artery perfusion  - Instruct patient to report change in severity of symptoms  Outcome: Progressing  Goal: Absence of cardiac dysrhythmias or at baseline rhythm  Description: INTERVENTIONS:  - Continuous cardiac monitoring, vital signs, obtain 12 lead EKG if ordered  - Administer antiarrhythmic and heart rate control medications as ordered  - Monitor electrolytes and administer replacement therapy as ordered  Outcome: Progressing

## 2024-06-17 ENCOUNTER — APPOINTMENT (INPATIENT)
Dept: RADIOLOGY | Facility: HOSPITAL | Age: 59
DRG: 189 | End: 2024-06-17
Attending: INTERNAL MEDICINE
Payer: COMMERCIAL

## 2024-06-17 LAB
ANION GAP SERPL CALCULATED.3IONS-SCNC: 11 MMOL/L (ref 4–13)
BUN SERPL-MCNC: 18 MG/DL (ref 5–25)
CALCIUM SERPL-MCNC: 8.8 MG/DL (ref 8.4–10.2)
CHLORIDE SERPL-SCNC: 100 MMOL/L (ref 96–108)
CO2 SERPL-SCNC: 27 MMOL/L (ref 21–32)
CREAT SERPL-MCNC: 0.69 MG/DL (ref 0.6–1.3)
ERYTHROCYTE [DISTWIDTH] IN BLOOD BY AUTOMATED COUNT: 12.3 % (ref 11.6–15.1)
GFR SERPL CREATININE-BSD FRML MDRD: 103 ML/MIN/1.73SQ M
GLUCOSE SERPL-MCNC: 166 MG/DL (ref 65–140)
GLUCOSE SERPL-MCNC: 172 MG/DL (ref 65–140)
GLUCOSE SERPL-MCNC: 216 MG/DL (ref 65–140)
GLUCOSE SERPL-MCNC: 250 MG/DL (ref 65–140)
GLUCOSE SERPL-MCNC: 311 MG/DL (ref 65–140)
HCT VFR BLD AUTO: 46.1 % (ref 36.5–49.3)
HGB BLD-MCNC: 15.4 G/DL (ref 12–17)
MAGNESIUM SERPL-MCNC: 2 MG/DL (ref 1.9–2.7)
MCH RBC QN AUTO: 29.8 PG (ref 26.8–34.3)
MCHC RBC AUTO-ENTMCNC: 33.4 G/DL (ref 31.4–37.4)
MCV RBC AUTO: 89 FL (ref 82–98)
PLATELET # BLD AUTO: 246 THOUSANDS/UL (ref 149–390)
PMV BLD AUTO: 10.4 FL (ref 8.9–12.7)
POTASSIUM SERPL-SCNC: 3.6 MMOL/L (ref 3.5–5.3)
RBC # BLD AUTO: 5.16 MILLION/UL (ref 3.88–5.62)
SODIUM SERPL-SCNC: 138 MMOL/L (ref 135–147)
WBC # BLD AUTO: 7.42 THOUSAND/UL (ref 4.31–10.16)

## 2024-06-17 PROCEDURE — 94664 DEMO&/EVAL PT USE INHALER: CPT | Performed by: SOCIAL WORKER

## 2024-06-17 PROCEDURE — 97116 GAIT TRAINING THERAPY: CPT

## 2024-06-17 PROCEDURE — 94760 N-INVAS EAR/PLS OXIMETRY 1: CPT | Performed by: SOCIAL WORKER

## 2024-06-17 PROCEDURE — 71045 X-RAY EXAM CHEST 1 VIEW: CPT

## 2024-06-17 PROCEDURE — 94760 N-INVAS EAR/PLS OXIMETRY 1: CPT

## 2024-06-17 PROCEDURE — 80048 BASIC METABOLIC PNL TOTAL CA: CPT

## 2024-06-17 PROCEDURE — 83735 ASSAY OF MAGNESIUM: CPT

## 2024-06-17 PROCEDURE — 94640 AIRWAY INHALATION TREATMENT: CPT

## 2024-06-17 PROCEDURE — 85027 COMPLETE CBC AUTOMATED: CPT

## 2024-06-17 PROCEDURE — 94640 AIRWAY INHALATION TREATMENT: CPT | Performed by: SOCIAL WORKER

## 2024-06-17 PROCEDURE — 82948 REAGENT STRIP/BLOOD GLUCOSE: CPT

## 2024-06-17 PROCEDURE — 99232 SBSQ HOSP IP/OBS MODERATE 35: CPT | Performed by: INTERNAL MEDICINE

## 2024-06-17 RX ORDER — INSULIN LISPRO 100 [IU]/ML
1-6 INJECTION, SOLUTION INTRAVENOUS; SUBCUTANEOUS
Status: DISCONTINUED | OUTPATIENT
Start: 2024-06-17 | End: 2024-06-17 | Stop reason: SDUPTHER

## 2024-06-17 RX ORDER — DIAZEPAM 5 MG/1
10 TABLET ORAL EVERY 6 HOURS PRN
Status: COMPLETED | OUTPATIENT
Start: 2024-06-17 | End: 2024-06-17

## 2024-06-17 RX ORDER — NIFEDIPINE 30 MG/1
60 TABLET, EXTENDED RELEASE ORAL DAILY
Status: DISCONTINUED | OUTPATIENT
Start: 2024-06-17 | End: 2024-06-18 | Stop reason: HOSPADM

## 2024-06-17 RX ORDER — SPIRONOLACTONE 25 MG/1
25 TABLET ORAL DAILY
Status: DISCONTINUED | OUTPATIENT
Start: 2024-06-17 | End: 2024-06-18 | Stop reason: HOSPADM

## 2024-06-17 RX ORDER — FORMOTEROL FUMARATE DIHYDRATE 20 UG/2ML
20 SOLUTION RESPIRATORY (INHALATION)
Status: DISCONTINUED | OUTPATIENT
Start: 2024-06-17 | End: 2024-06-18 | Stop reason: HOSPADM

## 2024-06-17 RX ORDER — INSULIN GLARGINE 100 [IU]/ML
10 INJECTION, SOLUTION SUBCUTANEOUS
Status: DISCONTINUED | OUTPATIENT
Start: 2024-06-17 | End: 2024-06-18 | Stop reason: HOSPADM

## 2024-06-17 RX ORDER — BUDESONIDE 0.5 MG/2ML
0.5 INHALANT ORAL
Status: DISCONTINUED | OUTPATIENT
Start: 2024-06-17 | End: 2024-06-18 | Stop reason: HOSPADM

## 2024-06-17 RX ADMIN — PREDNISONE 40 MG: 20 TABLET ORAL at 08:48

## 2024-06-17 RX ADMIN — DIAZEPAM 10 MG: 5 TABLET ORAL at 22:45

## 2024-06-17 RX ADMIN — INSULIN LISPRO 6 UNITS: 100 INJECTION, SOLUTION INTRAVENOUS; SUBCUTANEOUS at 16:51

## 2024-06-17 RX ADMIN — INSULIN LISPRO 8 UNITS: 100 INJECTION, SOLUTION INTRAVENOUS; SUBCUTANEOUS at 22:46

## 2024-06-17 RX ADMIN — INSULIN GLARGINE 10 UNITS: 100 INJECTION, SOLUTION SUBCUTANEOUS at 22:45

## 2024-06-17 RX ADMIN — NIFEDIPINE 60 MG: 30 TABLET, FILM COATED, EXTENDED RELEASE ORAL at 08:49

## 2024-06-17 RX ADMIN — Medication 1 TABLET: at 08:49

## 2024-06-17 RX ADMIN — DIAZEPAM 10 MG: 5 TABLET ORAL at 11:08

## 2024-06-17 RX ADMIN — LEVALBUTEROL HYDROCHLORIDE 1.25 MG: 1.25 SOLUTION RESPIRATORY (INHALATION) at 13:10

## 2024-06-17 RX ADMIN — IPRATROPIUM BROMIDE 0.5 MG: 0.5 SOLUTION RESPIRATORY (INHALATION) at 13:10

## 2024-06-17 RX ADMIN — LEVALBUTEROL HYDROCHLORIDE 1.25 MG: 1.25 SOLUTION RESPIRATORY (INHALATION) at 19:02

## 2024-06-17 RX ADMIN — ACETAMINOPHEN 975 MG: 325 TABLET, FILM COATED ORAL at 22:45

## 2024-06-17 RX ADMIN — DOXAZOSIN 8 MG: 4 TABLET ORAL at 22:45

## 2024-06-17 RX ADMIN — HEPARIN SODIUM 5000 UNITS: 5000 INJECTION INTRAVENOUS; SUBCUTANEOUS at 05:28

## 2024-06-17 RX ADMIN — FORMOTEROL FUMARATE DIHYDRATE 20 MCG: 20 SOLUTION RESPIRATORY (INHALATION) at 19:03

## 2024-06-17 RX ADMIN — INSULIN LISPRO 2 UNITS: 100 INJECTION, SOLUTION INTRAVENOUS; SUBCUTANEOUS at 08:49

## 2024-06-17 RX ADMIN — BUDESONIDE 0.5 MG: 0.5 INHALANT RESPIRATORY (INHALATION) at 19:03

## 2024-06-17 RX ADMIN — DIAZEPAM 10 MG: 5 TABLET ORAL at 17:19

## 2024-06-17 RX ADMIN — HEPARIN SODIUM 5000 UNITS: 5000 INJECTION INTRAVENOUS; SUBCUTANEOUS at 22:45

## 2024-06-17 RX ADMIN — ACETAMINOPHEN 975 MG: 325 TABLET, FILM COATED ORAL at 14:31

## 2024-06-17 RX ADMIN — THIAMINE HCL TAB 100 MG 100 MG: 100 TAB at 08:48

## 2024-06-17 RX ADMIN — INSULIN LISPRO 5 UNITS: 100 INJECTION, SOLUTION INTRAVENOUS; SUBCUTANEOUS at 08:50

## 2024-06-17 RX ADMIN — SPIRONOLACTONE 25 MG: 25 TABLET ORAL at 08:49

## 2024-06-17 RX ADMIN — IPRATROPIUM BROMIDE 0.5 MG: 0.5 SOLUTION RESPIRATORY (INHALATION) at 07:11

## 2024-06-17 RX ADMIN — DIAZEPAM 10 MG: 5 TABLET ORAL at 03:46

## 2024-06-17 RX ADMIN — ATORVASTATIN CALCIUM 40 MG: 40 TABLET, FILM COATED ORAL at 16:50

## 2024-06-17 RX ADMIN — LEVALBUTEROL HYDROCHLORIDE 1.25 MG: 1.25 SOLUTION RESPIRATORY (INHALATION) at 07:11

## 2024-06-17 RX ADMIN — INSULIN LISPRO 5 UNITS: 100 INJECTION, SOLUTION INTRAVENOUS; SUBCUTANEOUS at 16:50

## 2024-06-17 RX ADMIN — INSULIN LISPRO 5 UNITS: 100 INJECTION, SOLUTION INTRAVENOUS; SUBCUTANEOUS at 12:49

## 2024-06-17 RX ADMIN — LISINOPRIL 40 MG: 20 TABLET ORAL at 08:48

## 2024-06-17 RX ADMIN — HEPARIN SODIUM 5000 UNITS: 5000 INJECTION INTRAVENOUS; SUBCUTANEOUS at 14:31

## 2024-06-17 RX ADMIN — INSULIN LISPRO 4 UNITS: 100 INJECTION, SOLUTION INTRAVENOUS; SUBCUTANEOUS at 12:49

## 2024-06-17 RX ADMIN — ASPIRIN 81 MG CHEWABLE TABLET 81 MG: 81 TABLET CHEWABLE at 08:49

## 2024-06-17 RX ADMIN — FOLIC ACID 1 MG: 1 TABLET ORAL at 08:48

## 2024-06-17 RX ADMIN — CLOPIDOGREL BISULFATE 75 MG: 75 TABLET, FILM COATED ORAL at 08:48

## 2024-06-17 RX ADMIN — NICOTINE 1 PATCH: 21 PATCH, EXTENDED RELEASE TRANSDERMAL at 08:57

## 2024-06-17 RX ADMIN — DOXYCYCLINE 100 MG: 100 INJECTION, POWDER, LYOPHILIZED, FOR SOLUTION INTRAVENOUS at 03:30

## 2024-06-17 RX ADMIN — DOXYCYCLINE 100 MG: 100 INJECTION, POWDER, LYOPHILIZED, FOR SOLUTION INTRAVENOUS at 14:31

## 2024-06-17 NOTE — PLAN OF CARE
Problem: PHYSICAL THERAPY ADULT  Goal: Performs mobility at highest level of function for planned discharge setting.  See evaluation for individualized goals.  Description: Treatment/Interventions: ADL retraining, Functional transfer training, LE strengthening/ROM, Elevations, Therapeutic exercise, Endurance training, Bed mobility, Gait training, Spoke to nursing, OT          See flowsheet documentation for full assessment, interventions and recommendations.  Outcome: Adequate for Discharge  Note: Prognosis: Good  Problem List: Pain  Assessment: Pt seen for PT treatment session this date. Therapy session focused on gait training, stair negotiation and transfer training in order to improve overall mobility and independence. Pt performing transfers at IND level, and functional mobility at IND level and stair negotiation at MOD I level. Pt was left sitting at the end of PT session with all needs in reach. Pt with no questions or concerns regarding d/c home; appears to be functioning at/ near baseline mobility levels. Pt with no further acute inpatient PT needs at this time- please re-consult if needed. PT to DC pt and recommends home w/ no rehab needs. Encourage pt to ambulate at least 3-4x/day with restorative/ nursing staff while remaining in hospital. The patient's AM-PAC Basic Mobility Inpatient Short Form Raw Score is 24. A Raw score of greater than 16 suggests the patient may benefit from discharge to home. Please also refer to the recommendation of the Physical Therapist for safe discharge planning.  Barriers to Discharge: None     Rehab Resource Intensity Level, PT: (S) No post-acute rehabilitation needs (Changed from level III resources. CM updated)    See flowsheet documentation for full assessment.

## 2024-06-17 NOTE — CASE MANAGEMENT
Case Management Progress Note    Patient name Kyler Almaguer  Location /-01 MRN [id number]  : 1965 Date 2024       LOS (days): 4  Geometric Mean LOS (GMLOS) (days): 3.6  Days to GMLOS:-0.5        OBJECTIVE:        Current admission status: Inpatient  Preferred Pharmacy:   UNKNOWN - FOLLOW UP PRIOR TO DISCHARGE TO E-PRESCRIBE  No address on file      Primary Care Provider: Maisha Mott MD    Primary Insurance: [employer] [facility] REP  Secondary Insurance:     PROGRESS NOTE:    TC to [facility] coordinator (Haven ROSALES) for updates -- CM awaiting updated PT/OT notes as well as final results of CXR to be read to fax over updated information for them to determine whether they are able to accept pt. CXR results now resolved, CM faxed requested documentation to [facility] @ [phone]. Awaiting review by [facility]'s clinical team.

## 2024-06-17 NOTE — RESPIRATORY THERAPY NOTE
Resp care   06/17/24 0713   Inhalation Therapy Tx   $ Inhalation Therapy Performed Yes   $ Pulse Oximetry Spot Check Charge Completed   Pre-Treatment Pulse 88   Breath Sounds Pre-Treatment Bilateral Diminished;Clear   Breath Sounds Post-Treatment Bilateral Expiratory wheezes   Post-Treatment Pulse 71   Resp Comments Pt offers no resp c/o. BS clear before tx, faint exp wheeze after udn tx. Sat 92% on RMA. Cont present therapy.

## 2024-06-17 NOTE — PHYSICAL THERAPY NOTE
PHYSICAL THERAPY NOTE      Patient Name: Kyler Almaguer  Today's Date: 6/17/2024 06/17/24 0931   PT Last Visit   PT Visit Date 06/17/24   Note Type   Note Type Treatment   Pain Assessment   Pain Assessment Tool 0-10   Pain Score 4   Pain Location/Orientation Orientation: Right;Location: [place]   Hospital Pain Intervention(s) Repositioned;Ambulation/increased activity   Restrictions/Precautions   Weight Bearing Precautions Per Order No   Other Precautions Pain   General   Chart Reviewed Yes   Response to Previous Treatment Patient with no complaints from previous session.   Family/Caregiver Present No   Cognition   Overall Cognitive Status WFL   Arousal/Participation Alert;Responsive;Cooperative   Attention Within functional limits   Orientation Level Oriented X4   Memory Within functional limits   Following Commands Follows all commands and directions without difficulty   Comments pleasant and cooperative. Motivated to participate   Bed Mobility   Supine to Sit Unable to assess   Sit to Supine Unable to assess   Transfers   Sit to Stand 7  Independent   Stand to Sit 7  Independent   Additional Comments Quick to comnplete. Transfers w/o AD   Ambulation/Elevation   Gait pattern Wide HOLLIS;Step through pattern   Gait Assistance 7  Independent   Assistive Device None   Distance 120' + 18 steps + 120'   Stair Management Assistance 6  Modified independent   Additional items Verbal cues   Stair Management Technique One rail R;Alternating pattern;Foreward;Reciprocal   Number of Stairs 18   Balance   Static Sitting Normal   Dynamic Sitting Good   Static Standing Fair +   Dynamic Standing Fair   Ambulatory Fair   Endurance Deficit   Endurance Deficit No   Activity Tolerance   Activity Tolerance Patient tolerated treatment well   Medical Staff Made Aware CM updated on DC recommendation   Nurse Made Aware RN cleared   Assessment   Prognosis  Good   Problem List Pain   Assessment Pt seen for PT treatment session this date. Therapy session focused on gait training, stair negotiation and transfer training in order to improve overall mobility and independence. Pt performing transfers at IND level, and functional mobility at IND level and stair negotiation at MOD I level. Pt was left sitting at the end of PT session with all needs in reach. Pt with no questions or concerns regarding d/c home; appears to be functioning at/ near baseline mobility levels. Pt with no further acute inpatient PT needs at this time- please re-consult if needed. PT to DC pt and recommends home w/ no rehab needs. Encourage pt to ambulate at least 3-4x/day with restorative/ nursing staff while remaining in hospital. The patient's AM-PAC Basic Mobility Inpatient Short Form Raw Score is 24. A Raw score of greater than 16 suggests the patient may benefit from discharge to home. Please also refer to the recommendation of the Physical Therapist for safe discharge planning.   Barriers to Discharge None   Goals   Patient Goals to go to rehab   STG Expiration Date 06/28/24   PT Treatment Day 1   Plan   Treatment/Interventions Spoke to case management;Spoke to nursing   Progress Discontinue PT  (No further acute PT needs, DC IPPT)   Discharge Recommendation   Rehab Resource Intensity Level, PT (S)  No post-acute rehabilitation needs  (Changed from level III resources. CM updated)   AM-PAC Basic Mobility Inpatient   Turning in Flat Bed Without Bedrails 4   Lying on Back to Sitting on Edge of Flat Bed Without Bedrails 4   Moving Bed to Chair 4   Standing Up From Chair Using Arms 4   Walk in Room 4   Climb 3-5 Stairs With Railing 4   Basic Mobility Inpatient Raw Score 24   Basic Mobility Standardized Score 57.68   [facility] Highest Level Of Mobility   JH-HLM Goal 8: Walk 250 feet or more   JH-HLM Achieved 8: Walk 250 feet ot more   Education   Education Provided Mobility training   Patient  Demonstrates acceptance/verbal understanding   End of Consult   Patient Position at End of Consult Bedside chair;All needs within reach     Matilde Parr PT, DPT

## 2024-06-17 NOTE — PROGRESS NOTES
[facility]  Progress Note  Name: Kyler Almaguer I  MRN: [id number]  Unit/Bed#: -01 I Date of Admission: 6/13/2024   Date of Service: 6/17/2024 I Hospital Day: 4    Assessment & Plan   COPD exacerbation (HCC)  Assessment & Plan  POA: Improving although still has faint wheezing on exam.    Plan:  Appreciate Pulmonology  recommendations  Continue prednisone 40 mg daily, day 3/5  Continue doxycycline 100 mg Q12h bid, day 5/5  Continue Atrovent/Xopenex nebs 3 times daily  Will need outpatient Pulmonology f/u     Rib fracture  Assessment & Plan  Noted to have nondisplaced right sixth and seventh rib fracture on rib series  Patient reportedly fell and hit the corner of a table a few days ago  Incentive spirometry  Pain control with Tylenol, lidocaine patch and as needed oxycodone  EKG without arrhythmias x24 hours. Will discontinue     Alcohol abuse  Assessment & Plan  Last drink on 6/7  Discontinued CIWA  Valium as needed x 3 doses   CM working on placement for rehab to [facility] - requirements listed below   PT/OT notes that verify patient has minimal STR needs  Repeat CXR - ordered and finalized without acute cardiopulmonary disease  Discontinued off oxy x24 hours  Medically cleared for discharge, pending inpatient EtOH rehab placement     History of crack cocaine use  Assessment & Plan  Reported by family  Monitor     History of stroke  Assessment & Plan  Continue PTA aspirin, statin and Plavix  Previously admitted with encephalopathy likely TME related to hypercapnia  Mentation currently at baseline and improving    Type 2 diabetes mellitus without complication, without long-term current use of insulin (HCC)  Assessment & Plan  Lab Results   Component Value Date    HGBA1C 7.0 (H) 06/13/2024       Recent Labs     06/16/24  1651 06/16/24  2048 06/17/24  0731 06/17/24  1131   POCGLU 257* 332* 166* 216*     Blood Sugar Average: Last 72 hrs:  (P) [id number]. Takes  "metformin as outpatient. Elevated BG levels on mealtime bolus and sliding scale insulin.    Plan:  Hold PTA metformin while inpatient   Increase Lantus to 10 units daily at bedtime  Continue lispro 5 unit tid AC   Continue Accu-Cheks QID with SSI correction, BG goal 140-180  Continue diabetic diet   Hypoglycemia protocol  Adjust insulin regimen as needed based on BG levels    Uncontrolled hypertension  Assessment & Plan  Blood pressure elevated in the 190s  BP (!) 143/104   Pulse 89   Temp 98.3 °F (36.8 °C)   Resp 19   Ht 5' 11\" (1.803 m)   Wt 119 kg (262 lb 5.6 oz)   SpO2 90%   BMI 36.59 kg/m²     Plan:  Continue PTA doxazosin and lisinopril  Change Norvasc to nifedipine 60 mg daily  Change hydrochlorothiazide to Aldactone 25 mg daily  Hydralazine 10 mg IV PRN for SBP > 160    * Acute respiratory failure with hypoxia and hypercapnia (HCC)-resolved as of 6/16/2024  Assessment & Plan  Initially presented to ER from [employer] [facility] with increased somnolence and was noted to be in COPD exacerbation.  Patient was started on BiPAP initially and thereafter patient was taken off BiPAP with subsequent improvement in symptoms.  Patient was frustrated and upset about staying in the hospital and therefore left AMA only to return 30 minutes later after he spoke with his wife who told him to come back to the hospital for admission.  Currently saturating well on room air  Does have decreased breath sounds and wheezing on exam concerning for COPD exacerbation - has improved  Blood cultures pending  Strep pneumo urine antigen negative  RP2 negative   BNP 20, CXR with evidence of pulmonary vascular congestion. S/p 1 dose lasix 20mg  Pro-Kannan negative x2, CRP elevated  Pulmonology consulted, appreciate recs - see below   Continue prednisone 40 mg x 5 days - day 1/5  Continue Doxy x 5 days - day 3/5  Neb schedule 3 times daily  Airway clearance  BiPAP as needed  CIWA protocol               VTE Pharmacologic Prophylaxis: VTE " Score: 3 Moderate Risk (Score 3-4) - Pharmacological DVT Prophylaxis Ordered: heparin.    Mobility:   Basic Mobility Inpatient Raw Score: 24  JH-HLM Goal: 8: Walk 250 feet or more  JH-HLM Achieved: 8: Walk 250 feet ot more  JH-HLM Goal achieved. Continue to encourage appropriate mobility.    Patient Centered Rounds: I performed bedside rounds with nursing staff today.   Discussions with Specialists or Other Care Team Provider: yes    Education and Discussions with Family / Patient:  yes.     Total Time Spent on Date of Encounter in care of patient: 39 mins. This time was spent on one or more of the following: performing physical exam; counseling and coordination of care; obtaining or reviewing history; documenting in the medical record; reviewing/ordering tests, medications or procedures; communicating with other healthcare professionals and discussing with patient's family/caregivers.    Current Length of Stay: 4 day(s)  Current Patient Status: Inpatient   Certification Statement: The patient will continue to require additional inpatient hospital stay due to pending placement  Discharge Plan:  pending placement    Code Status: Level 1 - Full Code    Subjective:   Seen and examined at bedside  Awake and alert  Stable on room air  No complaints    Objective:     Vitals:   Temp (24hrs), Av.2 °F (36.8 °C), Min:98.1 °F (36.7 °C), Max:98.3 °F (36.8 °C)    Temp:  [98.1 °F (36.7 °C)-98.3 °F (36.8 °C)] 98.3 °F (36.8 °C)  HR:  [80-98] 89  Resp:  [16-19] 19  BP: (143-172)/() 143/104  SpO2:  [89 %-92 %] 90 %  Body mass index is 36.59 kg/m².     Input and Output Summary (last 24 hours):     Intake/Output Summary (Last 24 hours) at 2024 1420  Last data filed at 2024 1340  Gross per 24 hour   Intake 984 ml   Output 1550 ml   Net -566 ml       Physical Exam:   Physical Exam  Vitals reviewed.   Constitutional:       Appearance: He is obese.   HENT:      Head: Atraumatic.      Mouth/Throat:      Mouth: Mucous  membranes are dry.   Eyes:      General: No scleral icterus.  Cardiovascular:      Rate and Rhythm: Normal rate and regular rhythm.      Heart sounds: Normal heart sounds.   Pulmonary:      Effort: No respiratory distress.      Breath sounds: No wheezing.   Abdominal:      General: Bowel sounds are normal.   Musculoskeletal:      Right lower leg: No edema.      Left lower leg: No edema.   Skin:     General: Skin is dry.      Capillary Refill: Capillary refill takes less than 2 seconds.      Findings: Bruising present.   Neurological:      Mental Status: He is alert. Mental status is at baseline.   Psychiatric:         Mood and Affect: Mood normal.          Additional Data:     Labs:  Results from last 7 days   Lab Units 06/17/24  0503 06/15/24  0524 06/14/24  0543   WBC Thousand/uL 7.42   < > 9.94   HEMOGLOBIN g/dL 15.4   < > 15.4   HEMATOCRIT % 46.1   < > 46.0   PLATELETS Thousands/uL 246   < > 277   SEGS PCT %  --   --  88*   LYMPHO PCT %  --   --  8*   MONO PCT %  --   --  3*   EOS PCT %  --   --  0    < > = values in this interval not displayed.     Results from last 7 days   Lab Units 06/17/24  0503 06/13/24  0449 06/11/24  0921   SODIUM mmol/L 138   < > 141   POTASSIUM mmol/L 3.6   < > 4.2   CHLORIDE mmol/L 100   < > 101   CO2 mmol/L 27   < > 33*   BUN mg/dL 18   < > 12   CREATININE mg/dL 0.69   < > 0.73   ANION GAP mmol/L 11   < > 7   CALCIUM mg/dL 8.8   < > 9.5   ALBUMIN g/dL  --   --  3.9   TOTAL BILIRUBIN mg/dL  --   --  0.25   ALK PHOS U/L  --   --  84   ALT U/L  --   --  79*   AST U/L  --   --  37   GLUCOSE RANDOM mg/dL 172*   < > 211*    < > = values in this interval not displayed.         Results from last 7 days   Lab Units 06/17/24  1131 06/17/24  0731 06/16/24  2048 06/16/24  1651 06/16/24  1149 06/16/24  0745 06/15/24  2036 06/15/24  1647 06/15/24  1059 06/15/24  0751 06/14/24 2047 06/14/24  1712   POC GLUCOSE mg/dl 216* 166* 332* 257* 200* 151* 333* 330* 262* 181* 406* 287*     Results from last  7 days   Lab Units 06/13/24  0752 06/11/24  1326   HEMOGLOBIN A1C % 7.0* 7.2*     Results from last 7 days   Lab Units 06/14/24  0543 06/13/24  0449   PROCALCITONIN ng/ml 0.07 0.11       Lines/Drains:  Invasive Devices       Peripheral Intravenous Line  Duration             Peripheral IV 06/16/24 Right;Ventral (anterior) Forearm <1 day                          Imaging: No pertinent imaging reviewed.    Recent Cultures (last 7 days):   Results from last 7 days   Lab Units 06/14/24  1103 06/13/24  1443   BLOOD CULTURE   --  No Growth at 72 hrs.  No Growth at 72 hrs.   LEGIONELLA URINARY ANTIGEN  Negative  --        Last 24 Hours Medication List:   Current Facility-Administered Medications   Medication Dose Route Frequency Provider Last Rate    acetaminophen  975 mg Oral Q8H [facility]      albuterol  2.5 mg Nebulization Q4H PRN Nadine Reyez MD      aspirin  81 mg Oral Daily [facility], [facility]      atorvastatin  40 mg Oral Daily With Dinner [facility], DO      clopidogrel  75 mg Oral Daily [facility], DO      diazepam  10 mg Oral Q6H PRN Leny Pepe MD      doxazosin  8 mg Oral HS [facility], DO      doxycycline  100 mg Intravenous Q12H [facility],  mg (06/17/24 0330)    folic acid  1 mg Oral Daily [facility], [facility]      heparin (porcine)  5,000 Units Subcutaneous Q8H [facility], DO      hydrALAZINE  10 mg Intravenous Q6H PRN Cristina Franks MD      insulin glargine  5 Units Subcutaneous HS Cristina Franks MD      insulin lispro  2-12 Units Subcutaneous 4x Daily (AC & HS) Cristina Franks MD      insulin lispro  5 Units Subcutaneous TID With Meals Kathy Downey PA-C      ipratropium  0.5 mg Nebulization TID [facility], DO      levalbuterol  1.25 mg Nebulization TID [facility]      lidocaine  1 patch Topical Daily [facility], DO      lisinopril  40 mg Oral Daily [facility], [facility]      multivitamin-minerals  1 tablet Oral  Daily Shawn Kolb DO      nicotine  1 patch Transdermal Daily Shawn Kolb DO      NIFEdipine  60 mg Oral Daily Leny Pepe MD      polyethylene glycol  17 g Oral Daily PRN Cristina Franks MD      predniSONE  40 mg Oral Daily Leny Pepe MD      spironolactone  25 mg Oral Daily Leny Pepe MD      Thiamine Mononitrate  100 mg Oral Daily Shawn Kolb DO          Today, Patient Was Seen By: Leny Pepe MD    **Please Note: This note may have been constructed using a voice recognition system.**

## 2024-06-17 NOTE — PROGRESS NOTES
Pastoral Care Progress Note    2024  Patient: Kyler Almaguer : 1965  Admission Date & Time: 2024 1343  MRN: [id number] CSN: [id number]       met with pt after reviewing chart. Pt share of struggle with alcohol and that he in working on recovery  supported pt with pray and explored relational supports. will continue to check-in on pt and remains availabe                  24 1500   Clinical Encounter Type   Visited With Patient   Routine Visit Introduction   Lutheran Encounters   Lutheran Needs Prayer

## 2024-06-18 VITALS
RESPIRATION RATE: 20 BRPM | SYSTOLIC BLOOD PRESSURE: 139 MMHG | WEIGHT: 262.35 LBS | DIASTOLIC BLOOD PRESSURE: 93 MMHG | BODY MASS INDEX: 36.73 KG/M2 | OXYGEN SATURATION: 94 % | TEMPERATURE: 97.6 F | HEART RATE: 87 BPM | HEIGHT: 71 IN

## 2024-06-18 LAB
ANION GAP SERPL CALCULATED.3IONS-SCNC: 8 MMOL/L (ref 4–13)
BACTERIA BLD CULT: NORMAL
BACTERIA BLD CULT: NORMAL
BUN SERPL-MCNC: 19 MG/DL (ref 5–25)
CALCIUM SERPL-MCNC: 8.9 MG/DL (ref 8.4–10.2)
CHLORIDE SERPL-SCNC: 102 MMOL/L (ref 96–108)
CO2 SERPL-SCNC: 29 MMOL/L (ref 21–32)
CREAT SERPL-MCNC: 0.75 MG/DL (ref 0.6–1.3)
ERYTHROCYTE [DISTWIDTH] IN BLOOD BY AUTOMATED COUNT: 12.3 % (ref 11.6–15.1)
GFR SERPL CREATININE-BSD FRML MDRD: 100 ML/MIN/1.73SQ M
GLUCOSE SERPL-MCNC: 167 MG/DL (ref 65–140)
GLUCOSE SERPL-MCNC: 167 MG/DL (ref 65–140)
GLUCOSE SERPL-MCNC: 184 MG/DL (ref 65–140)
HCT VFR BLD AUTO: 44.9 % (ref 36.5–49.3)
HGB BLD-MCNC: 15.5 G/DL (ref 12–17)
MCH RBC QN AUTO: 30.5 PG (ref 26.8–34.3)
MCHC RBC AUTO-ENTMCNC: 34.5 G/DL (ref 31.4–37.4)
MCV RBC AUTO: 88 FL (ref 82–98)
PLATELET # BLD AUTO: 245 THOUSANDS/UL (ref 149–390)
PMV BLD AUTO: 10.4 FL (ref 8.9–12.7)
POTASSIUM SERPL-SCNC: 3.7 MMOL/L (ref 3.5–5.3)
RBC # BLD AUTO: 5.08 MILLION/UL (ref 3.88–5.62)
SODIUM SERPL-SCNC: 139 MMOL/L (ref 135–147)
WBC # BLD AUTO: 8.44 THOUSAND/UL (ref 4.31–10.16)

## 2024-06-18 PROCEDURE — 94640 AIRWAY INHALATION TREATMENT: CPT | Performed by: SOCIAL WORKER

## 2024-06-18 PROCEDURE — 94760 N-INVAS EAR/PLS OXIMETRY 1: CPT | Performed by: SOCIAL WORKER

## 2024-06-18 PROCEDURE — NC001 PR NO CHARGE: Performed by: INTERNAL MEDICINE

## 2024-06-18 PROCEDURE — 94664 DEMO&/EVAL PT USE INHALER: CPT | Performed by: SOCIAL WORKER

## 2024-06-18 PROCEDURE — 99239 HOSP IP/OBS DSCHRG MGMT >30: CPT | Performed by: INTERNAL MEDICINE

## 2024-06-18 PROCEDURE — 82948 REAGENT STRIP/BLOOD GLUCOSE: CPT

## 2024-06-18 PROCEDURE — 85027 COMPLETE CBC AUTOMATED: CPT | Performed by: INTERNAL MEDICINE

## 2024-06-18 PROCEDURE — 80048 BASIC METABOLIC PNL TOTAL CA: CPT | Performed by: INTERNAL MEDICINE

## 2024-06-18 RX ORDER — SPIRONOLACTONE 25 MG/1
25 TABLET ORAL DAILY
Start: 2024-06-18

## 2024-06-18 RX ORDER — DOXAZOSIN 8 MG/1
8 TABLET ORAL
Qty: 90 TABLET | Refills: 0 | Status: SHIPPED | OUTPATIENT
Start: 2024-06-18

## 2024-06-18 RX ORDER — NIFEDIPINE 30 MG/1
60 TABLET, EXTENDED RELEASE ORAL DAILY
Start: 2024-06-18 | End: 2024-06-18

## 2024-06-18 RX ORDER — THIAMINE MONONITRATE (VIT B1) 100 MG
100 TABLET ORAL DAILY
Start: 2024-06-18

## 2024-06-18 RX ORDER — PREDNISONE 20 MG/1
40 TABLET ORAL DAILY
Start: 2024-06-18 | End: 2024-06-20

## 2024-06-18 RX ORDER — LISINOPRIL 20 MG/1
20 TABLET ORAL EVERY EVENING
Qty: 90 TABLET | Refills: 0 | Status: SHIPPED | OUTPATIENT
Start: 2024-06-18

## 2024-06-18 RX ORDER — INSULIN LISPRO 100 [IU]/ML
8 INJECTION, SOLUTION INTRAVENOUS; SUBCUTANEOUS
Status: DISCONTINUED | OUTPATIENT
Start: 2024-06-18 | End: 2024-06-18 | Stop reason: HOSPADM

## 2024-06-18 RX ORDER — ACETAMINOPHEN 325 MG/1
650 TABLET ORAL EVERY 6 HOURS PRN
Status: DISCONTINUED | OUTPATIENT
Start: 2024-06-18 | End: 2024-06-18 | Stop reason: HOSPADM

## 2024-06-18 RX ORDER — NIFEDIPINE 30 MG/1
60 TABLET, EXTENDED RELEASE ORAL DAILY
Qty: 90 TABLET | Refills: 0 | Status: SHIPPED | OUTPATIENT
Start: 2024-06-18

## 2024-06-18 RX ORDER — DIAZEPAM 5 MG/1
5 TABLET ORAL EVERY 6 HOURS PRN
Status: DISCONTINUED | OUTPATIENT
Start: 2024-06-18 | End: 2024-06-18 | Stop reason: HOSPADM

## 2024-06-18 RX ORDER — FOLIC ACID 1 MG/1
1 TABLET ORAL DAILY
Qty: 90 TABLET | Refills: 0 | Status: SHIPPED | OUTPATIENT
Start: 2024-06-18

## 2024-06-18 RX ORDER — FOLIC ACID 1 MG/1
1 TABLET ORAL DAILY
Start: 2024-06-18 | End: 2024-06-18

## 2024-06-18 RX ADMIN — NICOTINE 1 PATCH: 21 PATCH, EXTENDED RELEASE TRANSDERMAL at 08:21

## 2024-06-18 RX ADMIN — INSULIN LISPRO 8 UNITS: 100 INJECTION, SOLUTION INTRAVENOUS; SUBCUTANEOUS at 12:30

## 2024-06-18 RX ADMIN — LISINOPRIL 40 MG: 20 TABLET ORAL at 08:20

## 2024-06-18 RX ADMIN — INSULIN LISPRO 2 UNITS: 100 INJECTION, SOLUTION INTRAVENOUS; SUBCUTANEOUS at 08:22

## 2024-06-18 RX ADMIN — FORMOTEROL FUMARATE DIHYDRATE 20 MCG: 20 SOLUTION RESPIRATORY (INHALATION) at 07:29

## 2024-06-18 RX ADMIN — THIAMINE HCL TAB 100 MG 100 MG: 100 TAB at 08:20

## 2024-06-18 RX ADMIN — INSULIN LISPRO 2 UNITS: 100 INJECTION, SOLUTION INTRAVENOUS; SUBCUTANEOUS at 12:30

## 2024-06-18 RX ADMIN — FOLIC ACID 1 MG: 1 TABLET ORAL at 08:20

## 2024-06-18 RX ADMIN — ASPIRIN 81 MG CHEWABLE TABLET 81 MG: 81 TABLET CHEWABLE at 08:20

## 2024-06-18 RX ADMIN — INSULIN LISPRO 5 UNITS: 100 INJECTION, SOLUTION INTRAVENOUS; SUBCUTANEOUS at 08:21

## 2024-06-18 RX ADMIN — SPIRONOLACTONE 25 MG: 25 TABLET ORAL at 08:20

## 2024-06-18 RX ADMIN — BUDESONIDE 0.5 MG: 0.5 INHALANT RESPIRATORY (INHALATION) at 07:18

## 2024-06-18 RX ADMIN — NIFEDIPINE 60 MG: 30 TABLET, FILM COATED, EXTENDED RELEASE ORAL at 08:20

## 2024-06-18 RX ADMIN — DIAZEPAM 5 MG: 5 TABLET ORAL at 08:34

## 2024-06-18 RX ADMIN — PREDNISONE 40 MG: 20 TABLET ORAL at 08:20

## 2024-06-18 RX ADMIN — Medication 1 TABLET: at 08:20

## 2024-06-18 RX ADMIN — LEVALBUTEROL HYDROCHLORIDE 1.25 MG: 1.25 SOLUTION RESPIRATORY (INHALATION) at 07:18

## 2024-06-18 RX ADMIN — DOXYCYCLINE 100 MG: 100 INJECTION, POWDER, LYOPHILIZED, FOR SOLUTION INTRAVENOUS at 02:19

## 2024-06-18 RX ADMIN — ACETAMINOPHEN 975 MG: 325 TABLET, FILM COATED ORAL at 05:09

## 2024-06-18 RX ADMIN — CLOPIDOGREL BISULFATE 75 MG: 75 TABLET, FILM COATED ORAL at 08:20

## 2024-06-18 RX ADMIN — HEPARIN SODIUM 5000 UNITS: 5000 INJECTION INTRAVENOUS; SUBCUTANEOUS at 05:09

## 2024-06-18 NOTE — ASSESSMENT & PLAN NOTE
Last drink on 6/7  Discontinued CIWA  Valium as needed x 3 doses   CM working on placement for rehab to [facility] - requirements listed below   PT/OT notes that verify patient has minimal STR needs  Repeat CXR - ordered and finalized without acute cardiopulmonary disease  Discontinued off oxy x24 hours  Medically cleared for discharge, pending inpatient EtOH rehab placement

## 2024-06-18 NOTE — ASSESSMENT & PLAN NOTE
Lab Results   Component Value Date    HGBA1C 7.0 (H) 06/13/2024       Recent Labs     06/17/24  1131 06/17/24  1649 06/17/24  2209 06/18/24  0756   POCGLU 216* 250* 311* 167*     Blood Sugar Average: Last 72 hrs:  (P) 242.6193737108721027. Takes metformin as outpatient. Elevated BG levels on mealtime bolus and sliding scale insulin.    Plan:  Hold PTA metformin while inpatient   Increase Lantus to 10 units daily at bedtime  Continue lispro 8 unit tid AC   Continue Accu-Cheks QID with SSI correction, BG goal 140-180  Continue diabetic diet   Hypoglycemia protocol  Adjust insulin regimen as needed based on BG levels

## 2024-06-18 NOTE — CASE MANAGEMENT
Case Management Progress Note    Patient name Kyler Almaguer  Location /-01 MRN [id number]  : 1965 Date 2024       LOS (days): 5  Geometric Mean LOS (GMLOS) (days): 3.6  Days to GMLOS:-1.2        OBJECTIVE:        Current admission status: Inpatient  Preferred Pharmacy:   UNKNOWN - FOLLOW UP PRIOR TO DISCHARGE TO E-PRESCRIBE  No address on file      Primary Care Provider: Maisha Mott MD    Primary Insurance: [employer] REP  Secondary Insurance:     PROGRESS NOTE:    TC from [facility]AL[facility] @ [facility] yesterday evening, stating that clinical team has still not reached a decision on whether to admit pt. They require updated info on vitals (last notes sent on Friday indicating pt was severely hypertensive) and nursing notes, as they are a sub-acute facility and would need pt to be medically stable. [facility] faxed over requested information at [phone], and will f/u with [facility] this AM when she is in office.

## 2024-06-18 NOTE — DISCHARGE SUMMARY
[facility]  Discharge- [facility] 1965, 58 y.o. male MRN: [id number]  Unit/Bed#: [facility] Encounter: [id number]  Primary Care Provider: Maisha Mott MD   Date and time admitted to hospital: 6/13/2024  1:43 PM    COPD exacerbation (HCC)  Assessment & Plan  POA: Improving although still has faint wheezing on exam.    Plan:  Appreciate Pulmonology  recommendations  Continue prednisone 40 mg daily, day 3/5  Continue doxycycline 100 mg Q12h bid, day 5/5  Continue Atrovent/Xopenex nebs 3 times daily  Will need outpatient Pulmonology f/u     Rib fracture  Assessment & Plan  Noted to have nondisplaced right sixth and seventh rib fracture on rib series  Patient reportedly fell and hit the corner of a table a few days ago  Incentive spirometry  Pain control with Tylenol, lidocaine patch and as needed oxycodone  EKG without arrhythmias x24 hours. Will discontinue     Alcohol abuse  Assessment & Plan  Last drink on 6/7  Discontinued CIWA  Valium as needed x 3 doses   CM working on placement for rehab to [facility] - requirements listed below   PT/OT notes that verify patient has minimal STR needs  Repeat CXR - ordered and finalized without acute cardiopulmonary disease  Discontinued off oxy x24 hours  Medically cleared for discharge, pending inpatient EtOH rehab placement     History of crack cocaine use  Assessment & Plan  Reported by family  Monitor     History of stroke  Assessment & Plan  Continue PTA aspirin, statin and Plavix  Previously admitted with encephalopathy likely TME related to hypercapnia  Mentation currently at baseline and improving    Type 2 diabetes mellitus without complication, without long-term current use of insulin ([facility])  Assessment & Plan  Lab Results   Component Value Date    HGBA1C 7.0 (H) 06/13/2024       Recent Labs     06/17/24  1131 06/17/24  1649 06/17/24  2209 06/18/24  0756   POCGLU 216* 250* 311* 167*     Blood Sugar Average: Last 72  "hrs:  (P) 242.8591357629863369. Takes metformin as outpatient. Elevated BG levels on mealtime bolus and sliding scale insulin.    Plan:  Hold PTA metformin while inpatient   Increase Lantus to 10 units daily at bedtime  Continue lispro 8 unit tid AC   Continue Accu-Cheks QID with SSI correction, BG goal 140-180  Continue diabetic diet   Hypoglycemia protocol  Adjust insulin regimen as needed based on BG levels    Uncontrolled hypertension  Assessment & Plan  Blood pressure elevated in the 190s  /93 (BP Location: Left arm)   Pulse 87   Temp 97.6 °F (36.4 °C) (Oral)   Resp 20   Ht 5' 11\" (1.803 m)   Wt 119 kg (262 lb 5.6 oz)   SpO2 94%   BMI 36.59 kg/m²     Plan:  Continue PTA doxazosin and lisinopril  Change Norvasc to nifedipine 60 mg daily  Change hydrochlorothiazide to Aldactone 25 mg daily  Hydralazine 10 mg IV PRN for SBP > 160  Blood pressures now improved  Continue to monitor               Medical Problems       Resolved Problems  Date Reviewed: 6/18/2024            Resolved    * (Principal) Acute respiratory failure with hypoxia and hypercapnia (HCC) 6/16/2024     Resolved by  Cristina Franks MD          Admission Date:   Admission Orders (From admission, onward)       Ordered        06/13/24 1412  INPATIENT ADMISSION  Once                            Admitting Diagnosis: Alcohol withdrawal (HCC) [F10.939]  COPD with acute exacerbation (HCC) [J44.1]  History of encephalopathy [Z86.69]    HPI: as per, Shawn Kolb DO , on 6/13 Kyler Almaguer is a 58 y.o. male with a PMH of COPD, chronic cocaine use, alcohol abuse, history of stroke, type 2 diabetes mellitus, recent rib fracture after fall who presents with shortness of breath.  Patient presented earlier this morning in the ED and admitted for COPD exacerbation.  Patient was discharged as AMA as patient reported that he was frustrated and wanted to leave the hospital.  On initial arrival to the ED earlier this morning, patient was briefly on BiPAP. "  Patient symptoms improved and used transition off BiPAP.  He felt like his breathing was improving and therefore left however once discharge he returned within 30 minutes due to feeling worse again and his wife telling him to go back to the hospital.  Vital signs significant for tachypnea, tachycardia and complaining of right chest pain from known rib fracture.  Also complaining of anxiety and trying from alcohol.  Last alcohol use on 6/7.  Of note, patient was previously undergoing rehab at [facility] for alcohol abuse.  Patient is currently willing to stay in the hospital for further treatment.  Patient received breathing treatments in the ED and Valium for withdrawal symptoms.  Patient will be admitted for COPD exacerbation for IV steroids and IV antibiotic use.     Procedures Performed:   Orders Placed This Encounter   Procedures    ED ECG Documentation Only       Summary of Hospital Course: Patient presented with COPD exacerbation and uncontrolled hypertension.  Patient was seen and evaluated by pulmonology, finishing prednisone stable, stable on room air.  For uncontrolled hypertension, patient's hypertensive medications were adjusted.  He will continue with doxazosin and lisinopril.  He has been started on nifedipine 60 mg daily and Aldactone 25 mg daily.  He is suggested to be on salt restricted and carb consistent diet.  Monitor blood sugars and blood pressures as per facility requirements/protocol.    On day of discharge, he is awake, alert and hemodynamically stable on room air.  He does not have any complaints apart from being anxious, which is at baseline currently.    This is a brief summary of patient's inpatient stay, for more details.  Please see daily progress notes.    Significant Findings, Care, Treatment and Services Provided: see above     Complications: none     Condition at Discharge: stable          Discharge instructions/Information to patient and family:   See after visit summary for  information provided to patient and family.      Provisions for Follow-Up Care:  See after visit summary for information related to follow-up care and any pertinent home health orders.      PCP: Maisha Mott MD    Disposition:  rehab     Planned Readmission: No    Discharge Statement   I spent 36 minutes discharging the patient. This time was spent on the day of discharge. I had direct contact with the patient on the day of discharge. Additional documentation is required if more than 30 minutes were spent on discharge.     Discharge Medications:  See after visit summary for reconciled discharge medications provided to patient and family.

## 2024-06-18 NOTE — PROGRESS NOTES
[facility]  Progress Note  Name: Kyler Almaguer I  MRN: [id number]  Unit/Bed#: -01 I Date of Admission: 6/13/2024   Date of Service: 6/18/2024 I Hospital Day: 5    Assessment & Plan   COPD exacerbation (HCC)  Assessment & Plan  POA: Improving although still has faint wheezing on exam.    Plan:  Appreciate Pulmonology  recommendations  Continue prednisone 40 mg daily, day 3/5  Continue doxycycline 100 mg Q12h bid, day 5/5  Continue Atrovent/Xopenex nebs 3 times daily  Will need outpatient Pulmonology f/u     Rib fracture  Assessment & Plan  Noted to have nondisplaced right sixth and seventh rib fracture on rib series  Patient reportedly fell and hit the corner of a table a few days ago  Incentive spirometry  Pain control with Tylenol, lidocaine patch and as needed oxycodone  EKG without arrhythmias x24 hours. Will discontinue     Alcohol abuse  Assessment & Plan  Last drink on 6/7  Discontinued CIWA  Valium as needed x 3 doses   CM working on placement for rehab to [facility] - requirements listed below   PT/OT notes that verify patient has minimal STR needs  Repeat CXR - ordered and finalized without acute cardiopulmonary disease  Discontinued off oxy x24 hours  Medically cleared for discharge, pending inpatient EtOH rehab placement     History of crack cocaine use  Assessment & Plan  Reported by family  Monitor     History of stroke  Assessment & Plan  Continue PTA aspirin, statin and Plavix  Previously admitted with encephalopathy likely TME related to hypercapnia  Mentation currently at baseline and improving    Type 2 diabetes mellitus without complication, without long-term current use of insulin (HCC)  Assessment & Plan  Lab Results   Component Value Date    HGBA1C 7.0 (H) 06/13/2024       Recent Labs     06/17/24  1131 06/17/24  1649 06/17/24  2209 06/18/24  0756   POCGLU 216* 250* 311* 167*     Blood Sugar Average: Last 72 hrs:  (P) [id number]. Takes  "metformin as outpatient. Elevated BG levels on mealtime bolus and sliding scale insulin.    Plan:  Hold PTA metformin while inpatient   Increase Lantus to 10 units daily at bedtime  Continue lispro 8 unit tid AC   Continue Accu-Cheks QID with SSI correction, BG goal 140-180  Continue diabetic diet   Hypoglycemia protocol  Adjust insulin regimen as needed based on BG levels    Uncontrolled hypertension  Assessment & Plan  Blood pressure elevated in the 190s  /93 (BP Location: Left arm)   Pulse 87   Temp 97.6 °F (36.4 °C) (Oral)   Resp 20   Ht 5' 11\" (1.803 m)   Wt 119 kg (262 lb 5.6 oz)   SpO2 94%   BMI 36.59 kg/m²     Plan:  Continue PTA doxazosin and lisinopril  Change Norvasc to nifedipine 60 mg daily  Change hydrochlorothiazide to Aldactone 25 mg daily  Hydralazine 10 mg IV PRN for SBP > 160  Blood pressures now improved  Continue to monitor                VTE Pharmacologic Prophylaxis: VTE Score: 3 Moderate Risk (Score 3-4) - Pharmacological DVT Prophylaxis Ordered: heparin.    Mobility:   Basic Mobility Inpatient Raw Score: 24  JH-HLM Goal: 8: Walk 250 feet or more  JH-HLM Achieved: 8: Walk 250 feet ot more  JH-HLM Goal achieved. Continue to encourage appropriate mobility.    Patient Centered Rounds: I performed bedside rounds with nursing staff today.   Discussions with Specialists or Other Care Team Provider: yes    Education and Discussions with Family / Patient:  yes.     Total Time Spent on Date of Encounter in care of patient: 39 mins. This time was spent on one or more of the following: performing physical exam; counseling and coordination of care; obtaining or reviewing history; documenting in the medical record; reviewing/ordering tests, medications or procedures; communicating with other healthcare professionals and discussing with patient's family/caregivers.    Current Length of Stay: 5 day(s)  Current Patient Status: Inpatient   Certification Statement: The patient will continue to " require additional inpatient hospital stay due to pending placement  Discharge Plan:  pending placement    Code Status: Level 1 - Full Code    Subjective:   Seen and examined at bedside  No overnight events reported  Comfortable on room air  Adamant on getting regular diet      Objective:     Vitals:   Temp (24hrs), Av.9 °F (36.6 °C), Min:97.6 °F (36.4 °C), Max:98.2 °F (36.8 °C)    Temp:  [97.6 °F (36.4 °C)-98.2 °F (36.8 °C)] 97.6 °F (36.4 °C)  HR:  [75-99] 87  Resp:  [20] 20  BP: (135-147)/(91-98) 139/93  SpO2:  [90 %-97 %] 94 %  Body mass index is 36.59 kg/m².     Input and Output Summary (last 24 hours):     Intake/Output Summary (Last 24 hours) at 2024 1058  Last data filed at 2024 0900  Gross per 24 hour   Intake 1416 ml   Output 825 ml   Net 591 ml       Physical Exam:   Physical Exam  Vitals reviewed.   Constitutional:       Appearance: He is obese.   HENT:      Head: Atraumatic.      Mouth/Throat:      Mouth: Mucous membranes are dry.   Eyes:      General: No scleral icterus.  Cardiovascular:      Rate and Rhythm: Normal rate and regular rhythm.      Heart sounds: Normal heart sounds.   Pulmonary:      Effort: No respiratory distress.      Breath sounds: Normal breath sounds. No wheezing.   Abdominal:      General: Bowel sounds are normal.   Musculoskeletal:      Right lower leg: No edema.      Left lower leg: No edema.   Skin:     General: Skin is dry.      Capillary Refill: Capillary refill takes less than 2 seconds.      Findings: Bruising present.   Neurological:      Mental Status: He is alert and oriented to person, place, and time. Mental status is at baseline.          Additional Data:     Labs:  Results from last 7 days   Lab Units 24  0242 06/15/24  0524 24  0543   WBC Thousand/uL 8.44   < > 9.94   HEMOGLOBIN g/dL 15.5   < > 15.4   HEMATOCRIT % 44.9   < > 46.0   PLATELETS Thousands/uL 245   < > 277   SEGS PCT %  --   --  88*   LYMPHO PCT %  --   --  8*   MONO PCT %  --    --  3*   EOS PCT %  --   --  0    < > = values in this interval not displayed.     Results from last 7 days   Lab Units 06/18/24  0242   SODIUM mmol/L 139   POTASSIUM mmol/L 3.7   CHLORIDE mmol/L 102   CO2 mmol/L 29   BUN mg/dL 19   CREATININE mg/dL 0.75   ANION GAP mmol/L 8   CALCIUM mg/dL 8.9   GLUCOSE RANDOM mg/dL 167*         Results from last 7 days   Lab Units 06/18/24  0756 06/17/24  2209 06/17/24  1649 06/17/24  1131 06/17/24  0731 06/16/24  2048 06/16/24  1651 06/16/24  1149 06/16/24  0745 06/15/24  2036 06/15/24  1647 06/15/24  1059   POC GLUCOSE mg/dl 167* 311* 250* 216* 166* 332* 257* 200* 151* 333* 330* 262*     Results from last 7 days   Lab Units 06/13/24  0752 06/11/24  1326   HEMOGLOBIN A1C % 7.0* 7.2*     Results from last 7 days   Lab Units 06/14/24  0543 06/13/24  0449   PROCALCITONIN ng/ml 0.07 0.11       Lines/Drains:  Invasive Devices       Peripheral Intravenous Line  Duration             Peripheral IV 06/18/24 Left;Ventral (anterior) Forearm <1 day                          Imaging: No pertinent imaging reviewed.    Recent Cultures (last 7 days):   Results from last 7 days   Lab Units 06/14/24  1103 06/13/24  1443   BLOOD CULTURE   --  No Growth After 4 Days.  No Growth After 4 Days.   LEGIONELLA URINARY ANTIGEN  Negative  --        Last 24 Hours Medication List:   Current Facility-Administered Medications   Medication Dose Route Frequency Provider Last Rate    acetaminophen  650 mg Oral Q6H PRN Leny Pepe MD      albuterol  2.5 mg Nebulization Q4H PRN Nadine Reyez MD      aspirin  81 mg Oral Daily Shawn Kolb, DO      atorvastatin  40 mg Oral Daily With Dinner Shawn Kolb,       budesonide  0.5 mg Nebulization Q12H Leny Pepe MD      clopidogrel  75 mg Oral Daily Shawn Kolb, DO      diazepam  5 mg Oral Q6H PRN Leny Pepe MD      doxazosin  8 mg Oral HS Shawn Kolb,       folic acid  1 mg Oral Daily Shawn Kolb,       formoterol  20 mcg  Nebulization Q12H Leny Pepe MD      heparin (porcine)  5,000 Units Subcutaneous Q8H [facility] Shawn Kolb,       hydrALAZINE  10 mg Intravenous Q6H PRN Cristina Franks MD      insulin glargine  10 Units Subcutaneous HS Leny Pepe MD      insulin lispro  2-12 Units Subcutaneous 4x Daily (AC & HS) Cristina Franks MD      insulin lispro  5 Units Subcutaneous TID With Meals Kathy Downey PA-C      levalbuterol  1.25 mg Nebulization TID Shawn Kolb, DO      lidocaine  1 patch Topical Daily Harbhavnakramiro Velascoel, DO      lisinopril  40 mg Oral Daily Harbhavnakramiro Velascoel, DO      multivitamin-minerals  1 tablet Oral Daily Shawn Velascoel, DO      nicotine  1 patch Transdermal Daily Shawn Kolb, DO      NIFEdipine  60 mg Oral Daily Leny Pepe MD      polyethylene glycol  17 g Oral Daily PRN Cristina Franks MD      predniSONE  40 mg Oral Daily Leny Pepe MD      spironolactone  25 mg Oral Daily Leny Pepe MD      Thiamine Mononitrate  100 mg Oral Daily Shawn Kolb, DO          Today, Patient Was Seen By: Leny Pepe MD    **Please Note: This note may have been constructed using a voice recognition system.**

## 2024-06-18 NOTE — NURSING NOTE
This RN called [facility] at [phone]. Report was given to Lorena HORN . Medications were sent with patient. AVS was faxed to [phone]

## 2024-06-18 NOTE — ASSESSMENT & PLAN NOTE
"Blood pressure elevated in the 190s  /93 (BP Location: Left arm)   Pulse 87   Temp 97.6 °F (36.4 °C) (Oral)   Resp 20   Ht 5' 11\" (1.803 m)   Wt 119 kg (262 lb 5.6 oz)   SpO2 94%   BMI 36.59 kg/m²     Plan:  Continue PTA doxazosin and lisinopril  Change Norvasc to nifedipine 60 mg daily  Change hydrochlorothiazide to Aldactone 25 mg daily  Hydralazine 10 mg IV PRN for SBP > 160  Blood pressures now improved  Continue to monitor   "

## 2024-06-18 NOTE — DISCHARGE INSTR - AVS FIRST PAGE
Please see your PCP/family doctor within 7-10 days of discharge to discuss recent hospitalization  Salt restricted diet  Take your blood pressure medications as prescribed

## 2024-06-18 NOTE — CASE MANAGEMENT
Case Management Discharge Planning Note    Patient name Kyler Fernandes  Location /-01 MRN [id number]  : 1965 Date 2024       Current Admission Date: 2024  Current Admission Diagnosis:Type 2 diabetes mellitus without complication, without long-term current use of insulin (HCC)   Patient Active Problem List    Diagnosis Date Noted Date Diagnosed    COPD exacerbation (HCC) 2024     Rib fracture 2024     Encephalopathy 2024     Uncontrolled hypertension 2024     Type 2 diabetes mellitus without complication, without long-term current use of insulin (HCC) 2024     History of stroke 2024     History of crack cocaine use 2024     Alcohol abuse 2024       LOS (days): 5  Geometric Mean LOS (GMLOS) (days): 3.6  Days to GMLOS:-1.3     OBJECTIVE:  Risk of Unplanned Readmission Score: 14.21         Current admission status: Inpatient   Preferred Pharmacy:   UNKNOWN - FOLLOW UP PRIOR TO DISCHARGE TO E-PRESCRIBE  No address on file      Primary Care Provider: Maisha Mott MD    Primary Insurance: [facility]  Secondary Insurance:     DISCHARGE DETAILS:    Discharge planning discussed with:: Pt  Freedom of Choice: Yes  Comments - Freedom of Choice: Discussed FOC                     Requested Home Health Care         Is the patient interested in C at discharge?: No         Other Referral/Resources/Interventions Provided:  Interventions: Substance Abuse Treatment  Referral Comments: [facility] is able to accept pt today. They are able to provide transport. CM relayed information to pt, who is agreeable to DCP. [facility]  will call CM when they are on their way to hospitals for pickup. Nursing and Dr. Pepe aware of plan.                                             IMM Given (Date):: 24  IMM Given to:: Patient          Accepting Facility Name, City & State : [facility]  Receiving Facility/Agency Phone Number:  [phone] Opt 3 ask for [facility]      Please call report to: [phone]

## 2024-06-18 NOTE — CASE MANAGEMENT
Case Management Discharge Planning Note    Patient name Kyler Almaguer  Location /-01 MRN [id number]  : 1965 Date 2024       Current Admission Date: 2024  Current Admission Diagnosis:Type 2 diabetes mellitus without complication, without long-term current use of insulin (HCC)   Patient Active Problem List    Diagnosis Date Noted Date Diagnosed    COPD exacerbation ([facility]) 2024     Rib fracture 2024     Encephalopathy 2024     Uncontrolled hypertension 2024     Type 2 diabetes mellitus without complication, without long-term current use of insulin ([facility]) 2024     History of stroke 2024     History of crack cocaine use 2024     Alcohol abuse 2024       LOS (days): 5  Geometric Mean LOS (GMLOS) (days): 3.6  Days to GMLOS:-1.3     OBJECTIVE:  Risk of Unplanned Readmission Score: 15.19         Current admission status: Inpatient   Preferred Pharmacy:   UNKNOWN - FOLLOW UP PRIOR TO DISCHARGE TO E-PRESCRIBE  No address on file      Primary Care Provider: Maisha Mott MD    Primary Insurance: [facility]  Secondary Insurance:     DISCHARGE DETAILS:    Discharge planning discussed with:: Pt and pt's wifeJackelin  Freedom of Choice: Yes                                                                                         Additional Comments: Pt will be picked up by [facility]  at around 2pm per [facility], pt informed CM that he had his nebulizer when he arrived at the ED 5 days ago, but now it is not in his room. CM informed nursing, who called ED and was told that they were unable to locate any nebulizer. CM called pt's wife, who confirmed that she had brought all his medication and nebulizer to [email or web address]. CM called nursing staff at [email or web address], who stated that pt was not using his own nebulizer when he was there, but they were uncertain whether he arrived in the ED with a nebulizer or not. CM relayed information to  patient, who stated that he would use the facility's nebulizer while he is at [facility], and will order another one through his medical supplies company if necessary. CM and nursing will continue to attempt to locate his DME prior to his d/c.       Receiving Facility/Agency Phone Number: [phone] (Nurse Station)